# Patient Record
Sex: MALE | Race: WHITE | NOT HISPANIC OR LATINO | Employment: UNEMPLOYED | URBAN - NONMETROPOLITAN AREA
[De-identification: names, ages, dates, MRNs, and addresses within clinical notes are randomized per-mention and may not be internally consistent; named-entity substitution may affect disease eponyms.]

---

## 2017-01-04 ENCOUNTER — AMBULATORY - GICH (OUTPATIENT)
Dept: LAB | Facility: OTHER | Age: 42
End: 2017-01-04

## 2017-01-04 DIAGNOSIS — Z51.81 ENCOUNTER FOR THERAPEUTIC DRUG LEVEL MONITORING: ICD-10-CM

## 2017-01-04 LAB
A/G RATIO - HISTORICAL: 1.4 (ref 1–2)
ABSOLUTE BASOPHILS - HISTORICAL: 0.1 THOU/CU MM
ABSOLUTE EOSINOPHILS - HISTORICAL: 0.3 THOU/CU MM
ABSOLUTE LYMPHOCYTES - HISTORICAL: 1.7 THOU/CU MM (ref 0.9–2.9)
ABSOLUTE MONOCYTES - HISTORICAL: 0.5 THOU/CU MM
ABSOLUTE NEUTROPHILS - HISTORICAL: 4 THOU/CU MM (ref 1.7–7)
ALBUMIN SERPL-MCNC: 4.1 G/DL (ref 3.5–5.7)
ALP SERPL-CCNC: 69 IU/L (ref 34–104)
ALT (SGPT) - HISTORICAL: 28 IU/L (ref 7–52)
ANION GAP - HISTORICAL: 10 (ref 5–18)
AST SERPL-CCNC: 21 IU/L (ref 13–39)
BASOPHILS # BLD AUTO: 0.8 %
BILIRUB SERPL-MCNC: 0.4 MG/DL (ref 0.3–1)
BUN SERPL-MCNC: 9 MG/DL (ref 7–25)
BUN/CREAT RATIO - HISTORICAL: 8
CALCIUM SERPL-MCNC: 9.5 MG/DL (ref 8.6–10.3)
CHLORIDE SERPLBLD-SCNC: 105 MMOL/L (ref 98–107)
CHOL/HDL RATIO - HISTORICAL: 5.86
CHOLESTEROL TOTAL: 164 MG/DL
CO2 SERPL-SCNC: 27 MMOL/L (ref 21–31)
CREAT SERPL-MCNC: 1.15 MG/DL (ref 0.7–1.3)
EOSINOPHIL NFR BLD AUTO: 4.2 %
ERYTHROCYTE [DISTWIDTH] IN BLOOD BY AUTOMATED COUNT: 11.4 % (ref 11.5–15.5)
GFR IF NOT AFRICAN AMERICAN - HISTORICAL: >60 ML/MIN/1.73M2
GLOBULIN - HISTORICAL: 2.9 G/DL (ref 2–3.7)
GLUCOSE SERPL-MCNC: 111 MG/DL (ref 70–105)
HCT VFR BLD AUTO: 41.4 % (ref 37–53)
HDLC SERPL-MCNC: 28 MG/DL (ref 23–92)
HEMOGLOBIN: 14.3 G/DL (ref 13.5–17.5)
LDLC SERPL CALC-MCNC: 73 MG/DL
LYMPHOCYTES NFR BLD AUTO: 25.8 % (ref 20–44)
MCH RBC QN AUTO: 28.8 PG (ref 26–34)
MCHC RBC AUTO-ENTMCNC: 34.5 G/DL (ref 32–36)
MCV RBC AUTO: 84 FL (ref 80–100)
MONOCYTES NFR BLD AUTO: 8.2 %
NEUTROPHILS NFR BLD AUTO: 61 % (ref 42–72)
NON-HDL CHOLESTEROL - HISTORICAL: 136 MG/DL
PATIENT STATUS - HISTORICAL: ABNORMAL
PLATELET # BLD AUTO: 207 THOU/CU MM (ref 140–440)
PMV BLD: 8.4 FL (ref 6.5–11)
POTASSIUM SERPL-SCNC: 4.3 MMOL/L (ref 3.5–5.1)
PROLACTIN - HISTORICAL: 33.31 NG/ML
PROT SERPL-MCNC: 7 G/DL (ref 6.4–8.9)
RED BLOOD COUNT - HISTORICAL: 4.96 MIL/CU MM (ref 4.3–5.9)
SODIUM SERPL-SCNC: 142 MMOL/L (ref 133–143)
TRIGL SERPL-MCNC: 314 MG/DL
WHITE BLOOD COUNT - HISTORICAL: 6.5 THOU/CU MM (ref 4.5–11)

## 2017-01-23 ENCOUNTER — AMBULATORY - GICH (OUTPATIENT)
Dept: SCHEDULING | Facility: OTHER | Age: 42
End: 2017-01-23

## 2017-01-24 ENCOUNTER — COMMUNICATION - GICH (OUTPATIENT)
Dept: FAMILY MEDICINE | Facility: OTHER | Age: 42
End: 2017-01-24

## 2017-01-24 DIAGNOSIS — K59.00 CONSTIPATION: ICD-10-CM

## 2017-01-25 ENCOUNTER — COMMUNICATION - GICH (OUTPATIENT)
Dept: FAMILY MEDICINE | Facility: OTHER | Age: 42
End: 2017-01-25

## 2017-01-25 DIAGNOSIS — K59.00 CONSTIPATION: ICD-10-CM

## 2017-02-01 ENCOUNTER — COMMUNICATION - GICH (OUTPATIENT)
Dept: FAMILY MEDICINE | Facility: OTHER | Age: 42
End: 2017-02-01

## 2017-02-01 DIAGNOSIS — F20.9 SCHIZOPHRENIA (H): ICD-10-CM

## 2017-02-21 ENCOUNTER — OFFICE VISIT - GICH (OUTPATIENT)
Dept: FAMILY MEDICINE | Facility: OTHER | Age: 42
End: 2017-02-21

## 2017-02-21 ENCOUNTER — HISTORY (OUTPATIENT)
Dept: FAMILY MEDICINE | Facility: OTHER | Age: 42
End: 2017-02-21

## 2017-02-21 DIAGNOSIS — F20.9 SCHIZOPHRENIA (H): ICD-10-CM

## 2017-02-21 DIAGNOSIS — N52.2 DRUG-INDUCED ERECTILE DYSFUNCTION: ICD-10-CM

## 2017-02-21 DIAGNOSIS — E78.1 PURE HYPERGLYCERIDEMIA: ICD-10-CM

## 2017-02-21 DIAGNOSIS — E66.9 OBESITY: ICD-10-CM

## 2017-02-21 ASSESSMENT — PATIENT HEALTH QUESTIONNAIRE - PHQ9: SUM OF ALL RESPONSES TO PHQ QUESTIONS 1-9: 16

## 2017-03-22 ENCOUNTER — AMBULATORY - GICH (OUTPATIENT)
Dept: SCHEDULING | Facility: OTHER | Age: 42
End: 2017-03-22

## 2017-03-23 ENCOUNTER — COMMUNICATION - GICH (OUTPATIENT)
Dept: FAMILY MEDICINE | Facility: OTHER | Age: 42
End: 2017-03-23

## 2017-03-23 DIAGNOSIS — F20.9 SCHIZOPHRENIA (H): ICD-10-CM

## 2017-03-24 ENCOUNTER — AMBULATORY - GICH (OUTPATIENT)
Dept: SCHEDULING | Facility: OTHER | Age: 42
End: 2017-03-24

## 2017-08-18 ENCOUNTER — COMMUNICATION - GICH (OUTPATIENT)
Dept: FAMILY MEDICINE | Facility: OTHER | Age: 42
End: 2017-08-18

## 2017-08-18 DIAGNOSIS — E55.9 VITAMIN D DEFICIENCY: ICD-10-CM

## 2017-11-02 ENCOUNTER — AMBULATORY - GICH (OUTPATIENT)
Dept: LAB | Facility: OTHER | Age: 42
End: 2017-11-02

## 2017-11-02 DIAGNOSIS — Z51.81 ENCOUNTER FOR THERAPEUTIC DRUG LEVEL MONITORING: ICD-10-CM

## 2017-11-02 DIAGNOSIS — F20.9 SCHIZOPHRENIA (H): ICD-10-CM

## 2017-11-02 DIAGNOSIS — Z13.220 ENCOUNTER FOR SCREENING FOR LIPOID DISORDERS: ICD-10-CM

## 2017-11-02 LAB
A/G RATIO - HISTORICAL: 1.3 (ref 1–2)
ALBUMIN SERPL-MCNC: 4.3 G/DL (ref 3.5–5.7)
ALP SERPL-CCNC: 76 IU/L (ref 34–104)
ALT (SGPT) - HISTORICAL: 31 IU/L (ref 7–52)
ANION GAP - HISTORICAL: 10 (ref 5–18)
AST SERPL-CCNC: 22 IU/L (ref 13–39)
BILIRUB SERPL-MCNC: 0.4 MG/DL (ref 0.3–1)
BUN SERPL-MCNC: 13 MG/DL (ref 7–25)
BUN/CREAT RATIO - HISTORICAL: 12
CALCIUM SERPL-MCNC: 9.8 MG/DL (ref 8.6–10.3)
CHLORIDE SERPLBLD-SCNC: 104 MMOL/L (ref 98–107)
CHOL/HDL RATIO - HISTORICAL: 6.31
CHOLESTEROL TOTAL: 183 MG/DL
CO2 SERPL-SCNC: 25 MMOL/L (ref 21–31)
CREAT SERPL-MCNC: 1.11 MG/DL (ref 0.7–1.3)
GFR IF NOT AFRICAN AMERICAN - HISTORICAL: >60 ML/MIN/1.73M2
GLOBULIN - HISTORICAL: 3.3 G/DL (ref 2–3.7)
GLUCOSE SERPL-MCNC: 99 MG/DL (ref 70–105)
HDLC SERPL-MCNC: 29 MG/DL (ref 23–92)
LDLC SERPL CALC-MCNC: 103 MG/DL
NON-HDL CHOLESTEROL - HISTORICAL: 154 MG/DL
POTASSIUM SERPL-SCNC: 4.1 MMOL/L (ref 3.5–5.1)
PROLACTIN - HISTORICAL: 3.94 NG/ML
PROT SERPL-MCNC: 7.6 G/DL (ref 6.4–8.9)
PROVIDER ORDERDED STATUS - HISTORICAL: ABNORMAL
SODIUM SERPL-SCNC: 139 MMOL/L (ref 133–143)
TRIGL SERPL-MCNC: 255 MG/DL

## 2017-11-09 ENCOUNTER — AMBULATORY - GICH (OUTPATIENT)
Dept: LAB | Facility: OTHER | Age: 42
End: 2017-11-09

## 2017-11-09 DIAGNOSIS — F20.9 SCHIZOPHRENIA (H): ICD-10-CM

## 2017-11-09 DIAGNOSIS — Z13.220 ENCOUNTER FOR SCREENING FOR LIPOID DISORDERS: ICD-10-CM

## 2017-11-09 DIAGNOSIS — Z51.81 ENCOUNTER FOR THERAPEUTIC DRUG LEVEL MONITORING: ICD-10-CM

## 2017-11-09 LAB
ABSOLUTE BASOPHILS - HISTORICAL: 0 THOU/CU MM
ABSOLUTE EOSINOPHILS - HISTORICAL: 0.2 THOU/CU MM
ABSOLUTE IMMATURE GRANULOCYTES(METAS,MYELOS,PROS) - HISTORICAL: 0 THOU/CU MM
ABSOLUTE LYMPHOCYTES - HISTORICAL: 1.4 THOU/CU MM (ref 0.9–2.9)
ABSOLUTE MONOCYTES - HISTORICAL: 0.4 THOU/CU MM
ABSOLUTE NEUTROPHILS - HISTORICAL: 2.4 THOU/CU MM (ref 1.7–7)
BASOPHILS # BLD AUTO: 0.5 %
EOSINOPHIL NFR BLD AUTO: 4.3 %
ERYTHROCYTE [DISTWIDTH] IN BLOOD BY AUTOMATED COUNT: 12.3 % (ref 11.5–15.5)
HCT VFR BLD AUTO: 40.2 % (ref 37–53)
HEMOGLOBIN: 14.3 G/DL (ref 13.5–17.5)
IMMATURE GRANULOCYTES(METAS,MYELOS,PROS) - HISTORICAL: 0.2 %
LYMPHOCYTES NFR BLD AUTO: 32 % (ref 20–44)
MCH RBC QN AUTO: 30 PG (ref 26–34)
MCHC RBC AUTO-ENTMCNC: 35.6 G/DL (ref 32–36)
MCV RBC AUTO: 84 FL (ref 80–100)
MONOCYTES NFR BLD AUTO: 8.9 %
NEUTROPHILS NFR BLD AUTO: 54.1 % (ref 42–72)
PLATELET # BLD AUTO: 178 THOU/CU MM (ref 140–440)
PMV BLD: 9.9 FL (ref 6.5–11)
RED BLOOD COUNT - HISTORICAL: 4.77 MIL/CU MM (ref 4.3–5.9)
WHITE BLOOD COUNT - HISTORICAL: 4.4 THOU/CU MM (ref 4.5–11)

## 2017-12-28 NOTE — TELEPHONE ENCOUNTER
Patient Information     Patient Name MRN Sex Tenzin Montalvo [id number] Male 1975      Telephone Encounter by Georgina Millan RN at 2017  1:31 PM     Author:  Gerogina Millan RN Service:  (none) Author Type:  NURS- Registered Nurse     Filed:  2017  1:36 PM Encounter Date:  2017 Status:  Signed     :  Georgina Millan RN (NURS- Registered Nurse)            Pharmacy requesting refill of Vitamin D3 5000 IU. This is listed as a historical medication and has not been prescribed my Robin Smith MD in the past. Request was originally sent to [facility], but it was returned to pharmacy with a note to send to PCP.    Last appointment with PCP 17    Will route to PCP for review and approval if appropriate.      Unable to complete prescription refill per RN Medication Refill Policy.................... Georgina Millan RN ....................  2017   1:34 PM

## 2017-12-28 NOTE — ADDENDUM NOTE
Patient Information     Patient Name MRN Sex Tenzin Montalvo [id number] Male 1975      Addendum Note by Sia Hanson at 2017 11:21 AM     Author:  Sia Hanson Service:  (none) Author Type:  (none)     Filed:  2017 11:21 AM Encounter Date:  2017 Status:  Signed     :  Sia Hanson       Addended by: SIA HANSON on: 2017 11:21 AM        Modules accepted: Orders

## 2018-01-03 NOTE — TELEPHONE ENCOUNTER
Patient Information     Patient Name MRN Sex Tenzin Montalvo [id number] Male 1975      Telephone Encounter by Robin Smith MD at 2017  3:06 PM     Author:  Robin Smith MD Service:  (none) Author Type:  Physician     Filed:  2017  3:08 PM Encounter Date:  2017 Status:  Signed     :  Robin Smith MD (Physician)            Home health certification and plan of care reviewed and signed today. Skilled nursing services required for monitoring of schizophrenia.

## 2018-01-03 NOTE — NURSING NOTE
Patient Information     Patient Name MRN Sex Tenzin Montalvo [id number] Male 1975      Nursing Note by Nely Dimas at 2017  7:45 AM     Author:  Nely Dimas Service:  (none) Author Type:  (none)     Filed:  2017  7:53 AM Encounter Date:  2017 Status:  Signed     :  Nely Dimas            Patient presents today as a follow-up on chronic medical condition.  Nely Dimas LPN  2017  7:38 AM

## 2018-01-03 NOTE — TELEPHONE ENCOUNTER
Patient Information     Patient Name MRN Tenzin Wagoner [id number] Male 1975      Telephone Encounter by Georgina Millan RN at 2017  1:19 PM     Author:  Georgina Millan RN Service:  (none) Author Type:  NURS- Registered Nurse     Filed:  2017  1:22 PM Encounter Date:  2017 Status:  Signed     :  Georgina Millan RN (NURS- Registered Nurse)            Redundant Refill Request for Senna refused;  Medication:SENNA PLUS 8.6-50 mg tablet  Prescription #:[id number]    Qty:90 tablet   Ref:3  Start:2016   Unable to complete prescription refill per RN Medication Refill Policy.................... Georgina Millan RN ....................  2017   1:20 PM

## 2018-01-03 NOTE — TELEPHONE ENCOUNTER
Patient Information     Patient Name MRN Tenzin Wagoner [id number] Male 1975      Telephone Encounter by Georgina Millan RN at 2017  7:55 AM     Author:  Georgina Millan RN Service:  (none) Author Type:  NURS- Registered Nurse     Filed:  2017  8:00 AM Encounter Date:  2017 Status:  Signed     :  Georgina Millan RN (NURS- Registered Nurse)            Pharmacy states he has used up all refills since . Should have had enough to last him until 2017. Fill dates: 16, 16, 16, 16, 10/7/16, 16 Please advise.    This is a Refill request from: [facility]   Name of Medication: Senna  Quantity requested: 90  Last fill date: 16  Due for refill: no  Last visit with FRANCESCO SMITH was on: 2016 in [facility]  PCP:  Francesco Smith MD  Controlled Substance Agreement:  n/a   Diagnosis r/t this medication request: Constipation     Unable to complete prescription refill per RN Medication Refill Policy.................... Georgina Millan RN ....................  2017   7:56 AM

## 2018-01-03 NOTE — PATIENT INSTRUCTIONS
Patient Information     Patient Name MRN Tenzin Wagoner [id number] Male 1975      Patient Instructions by Robin Smith MD at 2017  7:45 AM     Author:  Robin Smith MD Service:  (none) Author Type:  Physician     Filed:  2017  8:12 AM Encounter Date:  2017 Status:  Signed     :  Robin Smith MD (Physician)            Natural fats and proteins are good for you.  Avoid sugars and starches including bread, pasta, cereal, rice and potatoes.  Full fat dairy is OK.

## 2018-01-03 NOTE — PROGRESS NOTES
Patient Information     Patient Name MRN Tenzin Wagoner [id number] Male 1975      Progress Notes by Robin Smith MD at 2017  7:45 AM     Author:  Robin Smith MD Service:  (none) Author Type:  Physician     Filed:  2017  8:27 AM Encounter Date:  2017 Status:  Signed     :  Robin Smith MD (Physician)            SUBJECTIVE:    Tenzin Ca is a 41 y.o. male who presents for review of chronic health issues.    HPI: Patient has a history of chronic schizophrenia with medication management by [facility]. He had blood work done recently which he would like to discuss. He thinks his medications are causing increased appetite and his weight is up about 15 pounds since August of last year. His schizophrenia has been well-controlled recently. He notes some difficulty with erections also likely related to his medications and obesity.    PROBLEM LIST:  Patient Active Problem List     Diagnosis  Code     MAJOR DPRSV DISORDER RECURRENT EPISODE UNSPEC F33.9     SCHIZOPHRENIA F20.9     HYPERTRIGLYCERIDEMIA E78.1     PSORIASIS L40.9     LUMBAR STRAIN, ACUTE S33.5XXA     Anal fissure K60.2     Delayed ejaculation F52.32     Erectile dysfunction N52.9     Chronic pain of left knee M25.562, G89.29     Plica of knee M67.50     Torn medial meniscus S83.249A     S/P left knee arthroscopy Z98.890     PAST MEDICAL HISTORY:  Past Medical History      Diagnosis   Date     Chronic mental illness       Disability related to mental illness       Hx of psychiatric hospitalization  2016     SURGICAL HISTORY:  Past Surgical History       Procedure   Laterality Date     Knee arthroscopy  Left      Left Knee Arthroscopy and Partial Meniscectomy and Chondroplasty         SOCIAL HISTORY:  Social History     Social History        Marital status:  Single     Spouse name: N/A     Number of children:  N/A     Years of education:  N/A     Occupational History      Not on file.     Social History Main  Topics       Smoking status: Never Smoker     Smokeless tobacco: Never Used     Alcohol use No     Drug use: No     Sexual activity: Not on file     Other Topics  Concern     Seat Belt Yes     Social History Narrative     Disabled secondary to schizophrenia. Lives independently in his own apartment.     FAMILY HISTORY:  Family History      Problem  Relation Age of Onset     Alcohol/Drug Mother      Alcoholism Mother      Alcohol/Drug Brother      Alcoholism Brother      Alcohol/Drug Sister      Alcoholism Sister       CURRENT MEDICATIONS:   Current Outpatient Prescriptions       Medication  Sig Dispense Refill     cholecalciferol (VITAMIN D3) 5,000 unit capsule Take 5,000 Units by mouth once daily.  2     clonazePAM (KLONOPIN) 1 mg tablet Take 2 tablets by mouth once daily.       fish oil-omega-3 fatty acids (FISH OIL) 1,000-340 mg capsule Take 1 capsule by mouth once daily. 100 capsule 3     hydrOXYzine pamoate 100 mg capsule Take one capsule by mouth twice daily.  2     INVEGA SUSTENNA 117 mg/0.75 mL injection Inject 0.75 mL intramuscular/subcutaneous every 3 weeks.  0     OXcarbazepine (TRILEPTAL) 300 mg tablet Take 1 tablet by mouth every morning.       OXcarbazepine (TRILEPTAL) 600 mg tablet Take 1 tablet by mouth at bedtime.  0     sennosides-docusate, 8.6-50 mg, (SENNA PLUS) 8.6-50 mg tablet Take 1 tablet by mouth once daily. 90 tablet 5     traZODone (DESYREL) 100 mg tablet Take 1 tablet by mouth at bedtime.       trihexyphenidyl (ARTANE) 2 mg tablet Take one tablet by mouth in the am and 2 tablets at bedtime       zolpidem (AMBIEN) 10 mg tablet Take 1 tablet by mouth at bedtime.       No current facility-administered medications for this visit.      Medications have been reviewed by me and are current to the best of my knowledge and ability.    ALLERGIES:  Review of patient's allergies indicates no known allergies.    REVIEW OF SYSTEMS:  General: denies any general problems.  Eyes: denies  "problems  Ears/Nose/Throat: Reports frequent sneezing.  Cardiovascular: denies problems  Respiratory: denies problems  Gastrointestinal: denies problems  Genitourinary - male: see HPI  Musculoskeletal: denies problems  Skin: denies problems  Neurologic: denies problems  Psychiatric: denies problems  Endocrine: denies problems  Heme/Lymphatic: denies problems  Allergic/Immunologic: denies problems  PHQ Depression Screening 2/21/2017   Date of PHQ exam (doc flow) 2/21/2017   1. Lack of interest/pleasure 2 - More than half the days   2. Feeling down/depressed 2 - More than half the days   PHQ-2 TOTAL SCORE 4   3. Trouble sleeping 3 - Nearly every day   4. Decreased energy 3 - Nearly every day   5. Appetite change 3 - Nearly every day   6. Feelings of failure 1 - Several days   7. Trouble concentrating 0 - Not at all   8. Activity level 1 - Several days   9. Hurting yourself 1 - Several days   PHQ-9 TOTAL SCORE 16   PHQ-9 Severity Level moderately severe   Functional Impairment very difficult      OBJECTIVE:  /76  Pulse 80  Ht 1.746 m (5' 8.75\")  Wt 127 kg (280 lb)  BMI 41.65 kg/m2  EXAM:   General Appearance: Pleasant, alert, appropriate appearance for age. No acute distress  Head Exam: Normal. Normocephalic, atraumatic.  Eye Exam:  Normal external eye, conjunctiva, lids, cornea. DELMAR.  Ear Exam: Normal TM's bilaterally. Normal auditory canals and external ears. Non-tender.  Nose Exam: Normal external nose, mucus membranes, and septum.  OroPharynx Exam:  Dental hygiene adequate. Normal buccal mucosa. Normal pharynx.  Neck Exam:  Supple, no masses or nodes.  Thyroid Exam: No nodules or enlargement.  Chest/Respiratory Exam: Normal chest wall and respirations. Clear to auscultation.  Cardiovascular Exam: Regular rate and rhythm. S1, S2, no murmur, click, gallop, or rubs.  Gastrointestinal Exam: Soft, non-tender, no masses or organomegaly.  Genitourinary Exam Male: Normal male genitalia. No discharge or penile " ulcerations. No testicular masses or swelling.  Lymphatic Exam: Non-palpable nodes in neck, clavicular, axillary, or inguinal regions.  Musculoskeletal Exam: Back is straight and non-tender, full ROM of upper and lower extremities.  Foot Exam: Left and right foot: good pedal pulses, no lesions, nail hygiene good.  Skin: no rash or abnormalities  Neurologic Exam: Nonfocal, symmetric DTRs, normal gross motor, tone coordination and no tremor.  Psychiatric Exam: Alert and oriented - appropriate affect.    Results for orders placed or performed in visit on 01/04/17      COMP METABOLIC PANEL      Result  Value Ref Range    SODIUM 142 133 - 143 mmol/L    POTASSIUM 4.3 3.5 - 5.1 mmol/L    CHLORIDE 105 98 - 107 mmol/L    CO2,TOTAL 27 21 - 31 mmol/L    ANION GAP 10 5 - 18                    GLUCOSE 111 (H) 70 - 105 mg/dL    CALCIUM 9.5 8.6 - 10.3 mg/dL    BUN 9 7 - 25 mg/dL    CREATININE 1.15 0.70 - 1.30 mg/dL    BUN/CREAT RATIO           8                    GFR if African American >60 >60 ml/min/1.73m2    GFR if not African American >60 >60 ml/min/1.73m2    ALBUMIN 4.1 3.5 - 5.7 g/dL    PROTEIN,TOTAL 7.0 6.4 - 8.9 g/dL    GLOBULIN                  2.9 2.0 - 3.7 g/dL    A/G RATIO 1.4 1.0 - 2.0                    BILIRUBIN,TOTAL 0.4 0.3 - 1.0 mg/dL    ALK PHOSPHATASE 69 34 - 104 IU/L    ALT (SGPT) 28 7 - 52 IU/L    AST (SGOT) 21 13 - 39 IU/L   LIPID PANEL      Result  Value Ref Range    CHOLESTEROL,TOTAL 164 <200 mg/dL    TRIGLYCERIDES 314 (H) <150 mg/dL    HDL CHOLESTEROL 28 23 - 92 mg/dL    NON-HDL CHOLESTEROL 136 <145 mg/dl    CHOL/HDL RATIO            5.86 (H) <4.50                    LDL CHOLESTEROL 73 <100 mg/dL    PATIENT STATUS            FASTING                   PROLACTIN      Result  Value Ref Range    PROLACTIN 33.31 ng/mL   CBC WITH AUTO DIFFERENTIAL      Result  Value Ref Range    WHITE BLOOD COUNT         6.5 4.5 - 11.0 thou/cu mm    RED BLOOD COUNT           4.96 4.30 - 5.90 mil/cu mm    HEMOGLOBIN                 14.3 13.5 - 17.5 g/dL    HEMATOCRIT                41.4 37.0 - 53.0 %    MCV                       84 80 - 100 fL    MCH                       28.8 26.0 - 34.0 pg    MCHC                      34.5 32.0 - 36.0 g/dL    RDW                       11.4 (L) 11.5 - 15.5 %    PLATELET COUNT            207 140 - 440 thou/cu mm    MPV                       8.4 6.5 - 11.0 fL    NEUTROPHILS               61.0 42.0 - 72.0 %    LYMPHOCYTES               25.8 20.0 - 44.0 %    MONOCYTES                 8.2 <12.0 %    EOSINOPHILS               4.2 <8.0 %    BASOPHILS                 0.8 <3.0 %    ABSOLUTE NEUTROPHILS      4.0 1.7 - 7.0 thou/cu mm    ABSOLUTE LYMPHOCYTES      1.7 0.9 - 2.9 thou/cu mm    ABSOLUTE MONOCYTES        0.5 <0.9 thou/cu mm    ABSOLUTE EOSINOPHILS      0.3 <0.5 thou/cu mm    ABSOLUTE BASOPHILS        0.1 <0.3 thou/cu mm       ASSESSMENT/PLAN:    ICD-10-CM    1. SCHIZOPHRENIA  Managed by [facility].  F20.9    2. Obesity, unspecified obesity severity, unspecified obesity type  Recommended limiting dietary sugar and processed carbohydrates.  E66.9    3. HYPERTRIGLYCERIDEMIA  With low HDL. The above dietary changes and weight loss/exercise would be beneficial in this regard.  E78.1    4. Drug-induced erectile dysfunction  He will discuss this concern with his mental health provider.  N52.2        Robin Smith MD

## 2018-01-04 NOTE — TELEPHONE ENCOUNTER
Patient Information     Patient Name MRN Sex Tenzin Montalvo [id number] Male 1975      Telephone Encounter by Robin Smith MD at 3/23/2017  8:59 AM     Author:  Robin Smith MD Service:  (none) Author Type:  Physician     Filed:  3/23/2017  9:01 AM Encounter Date:  3/23/2017 Status:  Signed     :  Robin Smith MD (Physician)            Home health recertification and plan of care signed today. Patient has schizophrenia and needs medication monitoring and Invega administration by an RN.

## 2018-01-26 VITALS
DIASTOLIC BLOOD PRESSURE: 76 MMHG | HEART RATE: 80 BPM | BODY MASS INDEX: 41.47 KG/M2 | HEIGHT: 69 IN | SYSTOLIC BLOOD PRESSURE: 108 MMHG | WEIGHT: 280 LBS

## 2018-01-29 ASSESSMENT — PATIENT HEALTH QUESTIONNAIRE - PHQ9: SUM OF ALL RESPONSES TO PHQ QUESTIONS 1-9: 16

## 2018-02-20 ENCOUNTER — DOCUMENTATION ONLY (OUTPATIENT)
Dept: FAMILY MEDICINE | Facility: OTHER | Age: 43
End: 2018-02-20

## 2018-02-20 PROBLEM — F33.9 MAJOR DEPRESSIVE DISORDER, RECURRENT EPISODE (H): Status: ACTIVE | Noted: 2018-02-20

## 2018-02-20 PROBLEM — L40.9 PSORIASIS: Status: ACTIVE | Noted: 2018-02-20

## 2018-02-20 PROBLEM — F20.9 SCHIZOPHRENIA (H): Status: ACTIVE | Noted: 2018-02-20

## 2018-02-20 PROBLEM — S83.249A TORN MEDIAL MENISCUS: Status: ACTIVE | Noted: 2018-02-20

## 2018-02-20 PROBLEM — E66.9 OBESITY: Status: ACTIVE | Noted: 2017-02-21

## 2018-02-20 PROBLEM — M67.50 PLICA OF KNEE: Status: ACTIVE | Noted: 2018-02-20

## 2018-02-20 PROBLEM — E78.1 HYPERTRIGLYCERIDEMIA: Status: ACTIVE | Noted: 2018-02-20

## 2018-02-20 RX ORDER — HYDROXYZINE PAMOATE 100 MG
1 CAPSULE ORAL 2 TIMES DAILY
COMMUNITY
Start: 2016-06-09 | End: 2020-02-26

## 2018-02-20 RX ORDER — OXCARBAZEPINE 300 MG/1
300 TABLET, FILM COATED ORAL EVERY MORNING
COMMUNITY
Start: 2017-02-21

## 2018-02-20 RX ORDER — TRAZODONE HYDROCHLORIDE 100 MG/1
100 TABLET ORAL AT BEDTIME
COMMUNITY
Start: 2017-02-21 | End: 2018-02-22

## 2018-02-20 RX ORDER — TRIHEXYPHENIDYL HYDROCHLORIDE 2 MG/1
TABLET ORAL
COMMUNITY
Start: 2017-02-21 | End: 2018-03-29

## 2018-02-20 RX ORDER — AMOXICILLIN 250 MG
1 CAPSULE ORAL DAILY
COMMUNITY
Start: 2017-01-25 | End: 2018-02-22

## 2018-02-20 RX ORDER — OXCARBAZEPINE 600 MG/1
600 TABLET, FILM COATED ORAL AT BEDTIME
COMMUNITY
Start: 2016-06-09

## 2018-02-20 RX ORDER — ZOLPIDEM TARTRATE 10 MG/1
10 TABLET ORAL AT BEDTIME
COMMUNITY
Start: 2017-02-21

## 2018-02-20 RX ORDER — CLONAZEPAM 1 MG/1
2 TABLET ORAL DAILY
COMMUNITY
Start: 2017-02-21 | End: 2018-03-29

## 2018-02-22 ENCOUNTER — OFFICE VISIT (OUTPATIENT)
Dept: FAMILY MEDICINE | Facility: OTHER | Age: 43
End: 2018-02-22
Attending: FAMILY MEDICINE
Payer: MEDICARE

## 2018-02-22 VITALS
DIASTOLIC BLOOD PRESSURE: 70 MMHG | WEIGHT: 266.4 LBS | HEIGHT: 69 IN | SYSTOLIC BLOOD PRESSURE: 102 MMHG | HEART RATE: 72 BPM | BODY MASS INDEX: 39.46 KG/M2

## 2018-02-22 DIAGNOSIS — E55.9 VITAMIN D DEFICIENCY: ICD-10-CM

## 2018-02-22 DIAGNOSIS — K59.01 SLOW TRANSIT CONSTIPATION: Primary | ICD-10-CM

## 2018-02-22 PROBLEM — E53.8 VITAMIN B12 DEFICIENCY (NON ANEMIC): Status: ACTIVE | Noted: 2018-02-22

## 2018-02-22 PROCEDURE — G0463 HOSPITAL OUTPT CLINIC VISIT: HCPCS

## 2018-02-22 PROCEDURE — 99213 OFFICE O/P EST LOW 20 MIN: CPT | Performed by: FAMILY MEDICINE

## 2018-02-22 RX ORDER — ARIPIPRAZOLE 10 MG/1
1 TABLET ORAL DAILY
Refills: 1 | COMMUNITY
Start: 2017-05-30 | End: 2018-03-29

## 2018-02-22 RX ORDER — ARIPIPRAZOLE 20 MG/1
20 TABLET ORAL EVERY MORNING
Refills: 5 | COMMUNITY
Start: 2018-01-18 | End: 2021-11-22

## 2018-02-22 RX ORDER — AMOXICILLIN 250 MG
1 CAPSULE ORAL DAILY
Qty: 100 TABLET | Refills: 3 | Status: SHIPPED | OUTPATIENT
Start: 2018-02-22 | End: 2018-03-29

## 2018-02-22 ASSESSMENT — PAIN SCALES - GENERAL: PAINLEVEL: NO PAIN (0)

## 2018-02-22 NOTE — MR AVS SNAPSHOT
"              After Visit Summary   2018    Tenzin Ca    MRN: [id number]           Patient Information     Date Of Birth          1975        Visit Information        Provider Department      2018 11:15 AM Ervin Hassan MD [facility]        Today's Diagnoses     Slow transit constipation    -  1    Vitamin D deficiency           Follow-ups after your visit        Who to contact     If you have questions or need follow up information about today's clinic visit or your schedule please contact [facility] directly at [phone].  Normal or non-critical lab and imaging results will be communicated to you by Werkadoohart, letter or phone within 4 business days after the clinic has received the results. If you do not hear from us within 7 days, please contact the clinic through Collusiont or phone. If you have a critical or abnormal lab result, we will notify you by phone as soon as possible.  Submit refill requests through EpiBone or call your pharmacy and they will forward the refill request to us. Please allow 3 business days for your refill to be completed.          Additional Information About Your Visit        MyChart Information     EpiBone lets you send messages to your doctor, view your test results, renew your prescriptions, schedule appointments and more. To sign up, go to [email or web address] . Click on \"Log in\" on the left side of the screen, which will take you to the Welcome page. Then click on \"Sign up Now\" on the right side of the page.     You will be asked to enter the access code listed below, as well as some personal information. Please follow the directions to create your username and password.     Your access code is: [email or web address]  Expires: 2018 11:56 AM     Your access code will  in 90 days. If you need help or a new code, please call your [facility] clinic or [phone].        Care EveryWhere ID     This is your Care " "EveryWhere ID. This could be used by other organizations to access your [facility] medical records  [id number]        Your Vitals Were     Pulse Height BMI (Body Mass Index)             72 1.74 m (5' 8.5\") 39.92 kg/m2          Blood Pressure from Last 3 Encounters:   02/22/18 102/70   02/21/17 108/76   08/25/16 106/70    Weight from Last 3 Encounters:   02/22/18 120.8 kg (266 lb 6.4 oz)   02/21/17 127 kg (280 lb)   08/25/16 120.2 kg (265 lb)              Today, you had the following     No orders found for display         Today's Medication Changes          These changes are accurate as of 2/22/18 11:59 AM.  If you have any questions, ask your nurse or doctor.               These medicines have changed or have updated prescriptions.        Dose/Directions    * cholecalciferol 5000 UNITS Caps   This may have changed:  Another medication with the same name was added. Make sure you understand how and when to take each.   Used for:  Vitamin D deficiency        Dose:  5000 Units   Take 1 capsule (5,000 Units) by mouth daily   Quantity:  30 capsule   Refills:  0       * D 5000 5000 UNITS Caps   This may have changed:  Another medication with the same name was added. Make sure you understand how and when to take each.   Generic drug:  cholecalciferol        Dose:  5000 Units   Take 5,000 Units by mouth daily   Refills:  0       * cholecalciferol 1000 UNIT tablet   Commonly known as:  vitamin D3   This may have changed:  You were already taking a medication with the same name, and this prescription was added. Make sure you understand how and when to take each.   Used for:  Vitamin D deficiency        Dose:  1000 Units   Take 1 tablet (1,000 Units) by mouth daily   Quantity:  100 tablet   Refills:  3       * Notice:  This list has 3 medication(s) that are the same as other medications prescribed for you. Read the directions carefully, and ask your doctor or other care provider to review them with you.      Stop taking these " medicines if you haven't already. Please contact your care team if you have questions.     traZODone 100 MG tablet   Commonly known as:  DESYREL           TRAZODONE HCL PO                Where to get your medicines      These medications were sent to [id number] - [place], [place] - [address]  [address], [place] [address]     Phone:  [phone]     cholecalciferol 1000 UNIT tablet    senna-docusate 8.6-50 MG per tablet                Primary Care Provider Office Phone # Fax #    Ervin Hassan [phone]       [address]  [place] [address]        Equal Access to Services     [facility] : Hadii timo ernsto Soleela, waaxda luqadaha, qaybta kaalmada noegyada, nahid vicente . So [facility] [phone].    ATENCIÓN: Si habla español, tiene a swanson disposición servicios gratuitos de asistencia lingüística. [facility] [phone].    We comply with applicable federal civil rights laws and [place] laws. We do not discriminate on the basis of race, color, national origin, age, disability, sex, sexual orientation, or gender identity.            Thank you!     Thank you for choosing [facility] AND [facility]  for your care. Our goal is always to provide you with excellent care. Hearing back from our patients is one way we can continue to improve our services. Please take a few minutes to complete the written survey that you may receive in the mail after your visit with us. Thank you!             Your Updated Medication List - Protect others around you: Learn how to safely use, store and throw away your medicines at www.disposemymeds.org.          This list is accurate as of 2/22/18 11:59 AM.  Always use your most recent med list.                   Brand Name Dispense Instructions for use Diagnosis    * ARIPiprazole 10 MG tablet    ABILIFY     Take 1 tablet by mouth daily        * ARIPiprazole 20 MG tablet     ABILIFY     Take 20 mg by mouth every morning        * cholecalciferol 5000 UNITS Caps     30 capsule    Take 1 capsule (5,000 Units) by mouth daily    Vitamin D deficiency       * D 5000 5000 UNITS Caps   Generic drug:  cholecalciferol      Take 5,000 Units by mouth daily        * cholecalciferol 1000 UNIT tablet    vitamin D3    100 tablet    Take 1 tablet (1,000 Units) by mouth daily    Vitamin D deficiency       * CLONAZEPAM PO      Take 2 mg by mouth 2 times daily        * clonazePAM 1 MG tablet    klonoPIN     Take 2 mg by mouth daily        divalproex sodium delayed-release 250 MG DR tablet    DEPAKOTE    180 tablet    Take 3 tablets (750 mg) by mouth 2 times daily    Schizoaffective disorder, bipolar type (H)       * HYDROXYZINE PAMOATE PO      Take 100 mg by mouth 2 times daily        * hydrOXYzine 100 MG Caps      Take 1 capsule by mouth 2 times daily        INVEGA SUSTENNA 117 MG/0.75ML Susp   Generic drug:  paliperidone      Inject 0.75 mLs into the muscle every 3 weeks.        * OMEGA-3 FISH OIL PO      Take 1 g by mouth Use as directed        * FISH OIL PO      Take 1 capsule by mouth daily        * OXCARBAZEPINE PO      Take 600 mg by mouth 2 times daily        * OXcarbazepine 600 MG tablet    TRILEPTAL     Take 600 mg by mouth At Bedtime        * OXcarbazepine 300 MG tablet    TRILEPTAL     Take 300 mg by mouth every morning        * senna-docusate 8.6-50 MG per tablet    SENOKOT-S;PERICOLACE    60 tablet    Take 1 tablet by mouth 2 times daily    Schizoaffective disorder, bipolar type (H)       * senna-docusate 8.6-50 MG per tablet    SENOKOT-S;PERICOLACE    100 tablet    Take 1 tablet by mouth daily    Slow transit constipation       * TRIHEXYPHENIDYL HCL PO      Take 2 mg by mouth every morning        * TRIHEXYPHENIDYL HCL PO      Take 4 mg by mouth every evening        * trihexyphenidyl 2 MG tablet    ARTANE     Take one tablet by mouth in the am and 2 tablets at bedtime        * ziprasidone 40  MG capsule    GEODON    60 capsule    Take 1 capsule (40 mg) by mouth 2 times daily (with meals)    Schizoaffective disorder, bipolar type (H)       * ziprasidone 80 MG capsule    GEODON    60 capsule    Take 1 capsule (80 mg) by mouth 2 times daily (with meals)    Schizoaffective disorder, bipolar type (H)       zolpidem 10 MG tablet    AMBIEN     Take 10 mg by mouth At Bedtime        * Notice:  This list has 21 medication(s) that are the same as other medications prescribed for you. Read the directions carefully, and ask your doctor or other care provider to review them with you.

## 2018-02-22 NOTE — NURSING NOTE
Patient here to establish care and medication refills. Jaquelin Aguilera LPN .......................2/22/2018  11:32 AM

## 2018-02-22 NOTE — PROGRESS NOTES
SUBJECTIVE:   Tenzin Ca is a 42 year old male who presents to clinic today for the following health issues: Establish care medication refill    HPI Comments: Patient arrives here to establish care and medication refill.  He is in need of vitamin D.  His last vitamin D level was normal.  He is on 5000 units a day.  Also Colace for constipation.  Rest of his medications were given out by his mental health provider.  Patient does have a history of psychosis.  Major depression and schizophrenia.        Patient Active Problem List    Diagnosis Date Noted     Vitamin B12 deficiency (non anemic) 02/22/2018     Priority: Medium     Slow transit constipation 02/22/2018     Priority: Medium     Hypertriglyceridemia 02/20/2018     Priority: Medium     Major depressive disorder, recurrent episode (H) 02/20/2018     Priority: Medium     Plica of knee 02/20/2018     Priority: Medium     Psoriasis 02/20/2018     Priority: Medium     Schizophrenia (H) 02/20/2018     Priority: Medium     Overview:   hears voices       Torn medial meniscus 02/20/2018     Priority: Medium     Obesity 02/21/2017     Priority: Medium     S/P left knee arthroscopy 07/05/2016     Priority: Medium     Chronic pain of left knee 04/07/2016     Priority: Medium     Psychosis 07/02/2015     Priority: Medium     Erectile dysfunction 04/23/2014     Priority: Medium     Anal fissure 06/03/2013     Priority: Medium     Delayed ejaculation 06/03/2013     Priority: Medium     Sprain of lumbar region 06/21/2012     Priority: Medium     Past Medical History:   Diagnosis Date     Mental disorder     Disability related to mental illness     Personal history of other mental and behavioral disorders     2016      Past Surgical History:   Procedure Laterality Date     ARTHROSCOPY KNEE      Left Knee Arthroscopy and Partial Meniscectomy and Chondroplasty     No Known Allergies    Review of Systems     OBJECTIVE:     /70 (BP Location: Right arm, Patient Position:  "Sitting)  Pulse 72  Ht 5' 8.5\" (1.74 m)  Wt 266 lb 6.4 oz (120.8 kg)  BMI 39.92 kg/m2  Body mass index is 39.92 kg/(m^2).  Physical Exam   Constitutional: He appears well-developed.   Pulmonary/Chest: Effort normal.   Neurological: He is alert.   Psychiatric: He has a normal mood and affect.       none     ASSESSMENT/PLAN:         1. Vitamin D deficiency    - cholecalciferol (VITAMIN D3) 1000 UNIT tablet; Take 1 tablet (1,000 Units) by mouth daily  Dispense: 100 tablet; Refill: 3    2. Slow transit constipation    - senna-docusate (SENOKOT-S;PERICOLACE) 8.6-50 MG per tablet; Take 1 tablet by mouth daily  Dispense: 100 tablet; Refill: 3      Ervin Hassan MD  [facility] AND [facility]  "

## 2018-02-27 NOTE — TELEPHONE ENCOUNTER
"Clarification request received from [id number]:    Order sent to [facility] on 2018:    cholecalciferol (VITAMIN D3) 1000 UNIT tablet 100 tablet 3 2018  --   Sig: Take 1 tablet (1,000 Units) by mouth daily     Note from pharmacy:    \"This was last filled on 2018 for the Vitamin D3 5,ooo IU, is this a decrease or adding?\"    Per notes from office visit with Ervin Hassan MD on :    \"He is in need of vitamin D.  His last vitamin D level was normal.  He is on 5000 units a day. \"    Per assessment/plan from same office visit:    1. Vitamin D deficiency     - cholecalciferol (VITAMIN D3) 1000 UNIT tablet; Take 1 tablet (1,000 Units) by mouth daily  Dispense: 100 tablet; Refill: 3    Called [facility] and spoke with pharmacist, after verifying Patient's last name and . She was notified that this change was made at office visit.     Christine Palomino RN .............. 2018  4:28 PM           "

## 2018-03-29 ENCOUNTER — OFFICE VISIT (OUTPATIENT)
Dept: FAMILY MEDICINE | Facility: OTHER | Age: 43
End: 2018-03-29
Attending: FAMILY MEDICINE
Payer: MEDICARE

## 2018-03-29 VITALS
HEART RATE: 64 BPM | TEMPERATURE: 98 F | WEIGHT: 265 LBS | BODY MASS INDEX: 39.71 KG/M2 | SYSTOLIC BLOOD PRESSURE: 108 MMHG | DIASTOLIC BLOOD PRESSURE: 64 MMHG

## 2018-03-29 DIAGNOSIS — R05.9 COUGH: Primary | ICD-10-CM

## 2018-03-29 PROCEDURE — 99213 OFFICE O/P EST LOW 20 MIN: CPT | Performed by: FAMILY MEDICINE

## 2018-03-29 PROCEDURE — G0463 HOSPITAL OUTPT CLINIC VISIT: HCPCS

## 2018-03-29 RX ORDER — CODEINE PHOSPHATE AND GUAIFENESIN 10; 100 MG/5ML; MG/5ML
2 SOLUTION ORAL EVERY 4 HOURS PRN
Qty: 120 ML | Refills: 3 | Status: SHIPPED | OUTPATIENT
Start: 2018-03-29 | End: 2018-09-13

## 2018-03-29 NOTE — NURSING NOTE
Patient here for cough, chest and nasal congestion. Jaquelin Aguilera LPN .......................3/29/2018  2:50 PM

## 2018-03-29 NOTE — MR AVS SNAPSHOT
"              After Visit Summary   3/29/2018    Tenzin Ca    MRN: [id number]           Patient Information     Date Of Birth          1975        Visit Information        Provider Department      3/29/2018 2:45 PM Ervin Hassan MD [facility]        Today's Diagnoses     Cough    -  1       Follow-ups after your visit        Who to contact     If you have questions or need follow up information about today's clinic visit or your schedule please contact [facility] directly at [phone].  Normal or non-critical lab and imaging results will be communicated to you by Connectloudhart, letter or phone within 4 business days after the clinic has received the results. If you do not hear from us within 7 days, please contact the clinic through Connectloudhart or phone. If you have a critical or abnormal lab result, we will notify you by phone as soon as possible.  Submit refill requests through Help/Systems or call your pharmacy and they will forward the refill request to us. Please allow 3 business days for your refill to be completed.          Additional Information About Your Visit        MyChart Information     Help/Systems lets you send messages to your doctor, view your test results, renew your prescriptions, schedule appointments and more. To sign up, go to [email or web address] . Click on \"Log in\" on the left side of the screen, which will take you to the Welcome page. Then click on \"Sign up Now\" on the right side of the page.     You will be asked to enter the access code listed below, as well as some personal information. Please follow the directions to create your username and password.     Your access code is: [email or web address]  Expires: 2018 12:56 PM     Your access code will  in 90 days. If you need help or a new code, please call your [facility] or [phone].        Care EveryWhere ID     This is your Care EveryWhere ID. This could be used by other " organizations to access your [place] medical records  [id number]        Your Vitals Were     Pulse Temperature BMI (Body Mass Index)             64 98  F (36.7  C) 39.71 kg/m2          Blood Pressure from Last 3 Encounters:   03/29/18 108/64   02/22/18 102/70   02/21/17 108/76    Weight from Last 3 Encounters:   03/29/18 265 lb (120.2 kg)   02/22/18 266 lb 6.4 oz (120.8 kg)   02/21/17 280 lb (127 kg)              Today, you had the following     No orders found for display         Today's Medication Changes          These changes are accurate as of 3/29/18  3:28 PM.  If you have any questions, ask your nurse or doctor.               Start taking these medicines.        Dose/Directions    guaiFENesin-codeine 100-10 MG/5ML Soln solution   Commonly known as:  ROBITUSSIN AC   Used for:  Cough   Started by:  Ervin Hassan MD        Dose:  2 tsp.   Take 10 mLs by mouth every 4 hours as needed for cough   Quantity:  120 mL   Refills:  3         These medicines have changed or have updated prescriptions.        Dose/Directions    ARIPiprazole 20 MG tablet   Commonly known as:  ABILIFY   This may have changed:  Another medication with the same name was removed. Continue taking this medication, and follow the directions you see here.   Changed by:  Ervin Hassan MD        Dose:  20 mg   Take 20 mg by mouth every morning   Refills:  5       CLONAZEPAM PO   This may have changed:  Another medication with the same name was removed. Continue taking this medication, and follow the directions you see here.   Changed by:  Ervin Hassan MD        Dose:  2 mg   Take 2 mg by mouth 2 times daily   Refills:  0       D 5000 5000 UNITS Caps   This may have changed:  Another medication with the same name was removed. Continue taking this medication, and follow the directions you see here.   Generic drug:  cholecalciferol   Changed by:  Ervin Hassan MD        Dose:  5000 Units   Take 5,000 Units by mouth daily   Refills:   0       FISH OIL PO   This may have changed:  Another medication with the same name was removed. Continue taking this medication, and follow the directions you see here.   Changed by:  Ervin Hassan MD        Dose:  1 capsule   Take 1 capsule by mouth daily   Refills:  0       hydrOXYzine 100 MG Caps   This may have changed:  Another medication with the same name was removed. Continue taking this medication, and follow the directions you see here.   Changed by:  Ervin Hassan MD        Dose:  1 capsule   Take 1 capsule by mouth 2 times daily   Refills:  0       * OXcarbazepine 600 MG tablet   Commonly known as:  TRILEPTAL   This may have changed:  Another medication with the same name was removed. Continue taking this medication, and follow the directions you see here.   Changed by:  Ervin Hassan MD        Dose:  600 mg   Take 600 mg by mouth At Bedtime   Refills:  0       * OXcarbazepine 300 MG tablet   Commonly known as:  TRILEPTAL   This may have changed:  Another medication with the same name was removed. Continue taking this medication, and follow the directions you see here.   Changed by:  Ervin Hassan MD        Dose:  300 mg   Take 300 mg by mouth every morning   Refills:  0       senna-docusate 8.6-50 MG per tablet   Commonly known as:  SENOKOT-S;PERICOLACE   This may have changed:  Another medication with the same name was removed. Continue taking this medication, and follow the directions you see here.   Used for:  Schizoaffective disorder, bipolar type (H)   Changed by:  Ervin Hassan MD        Dose:  1 tablet   Take 1 tablet by mouth 2 times daily   Quantity:  60 tablet   Refills:  0       * Notice:  This list has 2 medication(s) that are the same as other medications prescribed for you. Read the directions carefully, and ask your doctor or other care provider to review them with you.      Stop taking these medicines if you haven't already. Please contact your care team if you have  questions.     divalproex sodium delayed-release 250 MG DR tablet   Commonly known as:  DEPAKOTE   Stopped by:  Ervin Hassan MD           INVEGA SUSTENNA 117 MG/0.75ML Susp   Generic drug:  paliperidone   Stopped by:  Ervin Hassan MD           trihexyphenidyl 2 MG tablet   Commonly known as:  ARTANE   Stopped by:  Ervin Hassan MD           TRIHEXYPHENIDYL HCL PO   Stopped by:  Ervin Hassan MD           ziprasidone 40 MG capsule   Commonly known as:  GEODON   Stopped by:  Ervin Hassan MD           ziprasidone 80 MG capsule   Commonly known as:  GEODON   Stopped by:  Ervin Hassan MD                Where to get your medicines      Some of these will need a paper prescription and others can be bought over the counter.  Ask your nurse if you have questions.     Bring a paper prescription for each of these medications     guaiFENesin-codeine 100-10 MG/5ML Soln solution                Primary Care Provider Office Phone # Fax #    Ervin Hassan [phone]       [address] [place] [address]        Equal Access to Services     [facility]: Hadii aad ku hadasho Soomaali, waaxda luqadaha, qaybta kaalmada adeegyaisabella, nahid vicente . So [facility] [phone].    ATENCIÓN: Si habla español, tiene a swanson disposición servicios gratuitos de asistencia lingüística. Llame al [phone].    We comply with applicable federal civil rights laws and [place] laws. We do not discriminate on the basis of race, color, national origin, age, disability, sex, sexual orientation, or gender identity.            Thank you!     Thank you for choosing [facility] AND [facility]  for your care. Our goal is always to provide you with excellent care. Hearing back from our patients is one way we can continue to improve our services. Please take a few minutes to complete the written survey that you may receive in the mail after your visit with us. Thank you!              Your Updated Medication List - Protect others around you: Learn how to safely use, store and throw away your medicines at www.disposemymeds.org.          This list is accurate as of 3/29/18  3:28 PM.  Always use your most recent med list.                   Brand Name Dispense Instructions for use Diagnosis    ARIPiprazole 20 MG tablet    ABILIFY     Take 20 mg by mouth every morning        CLONAZEPAM PO      Take 2 mg by mouth 2 times daily        D 5000 5000 UNITS Caps   Generic drug:  cholecalciferol      Take 5,000 Units by mouth daily        FISH OIL PO      Take 1 capsule by mouth daily        guaiFENesin-codeine 100-10 MG/5ML Soln solution    ROBITUSSIN AC    120 mL    Take 10 mLs by mouth every 4 hours as needed for cough    Cough       hydrOXYzine 100 MG Caps      Take 1 capsule by mouth 2 times daily        * OXcarbazepine 600 MG tablet    TRILEPTAL     Take 600 mg by mouth At Bedtime        * OXcarbazepine 300 MG tablet    TRILEPTAL     Take 300 mg by mouth every morning        senna-docusate 8.6-50 MG per tablet    SENOKOT-S;PERICOLACE    60 tablet    Take 1 tablet by mouth 2 times daily    Schizoaffective disorder, bipolar type (H)       zolpidem 10 MG tablet    AMBIEN     Take 10 mg by mouth At Bedtime        * Notice:  This list has 2 medication(s) that are the same as other medications prescribed for you. Read the directions carefully, and ask your doctor or other care provider to review them with you.

## 2018-03-29 NOTE — PROGRESS NOTES
SUBJECTIVE:   Tenzin Ca is a 42 year old male who presents to clinic today for the following health issues: Cough    HPI Comments: Patient arrives here for cough congestion.  This been going on for 1 week.  Reports getting a little worse.  Episode of emesis and gagging.  C sore throat is typically with coughing only.        Patient Active Problem List    Diagnosis Date Noted     Cough 03/29/2018     Priority: Medium     Vitamin B12 deficiency (non anemic) 02/22/2018     Priority: Medium     Slow transit constipation 02/22/2018     Priority: Medium     Hypertriglyceridemia 02/20/2018     Priority: Medium     Major depressive disorder, recurrent episode (H) 02/20/2018     Priority: Medium     Plica of knee 02/20/2018     Priority: Medium     Psoriasis 02/20/2018     Priority: Medium     Schizophrenia (H) 02/20/2018     Priority: Medium     Overview:   hears voices       Torn medial meniscus 02/20/2018     Priority: Medium     Obesity 02/21/2017     Priority: Medium     S/P left knee arthroscopy 07/05/2016     Priority: Medium     Chronic pain of left knee 04/07/2016     Priority: Medium     Psychosis 07/02/2015     Priority: Medium     Erectile dysfunction 04/23/2014     Priority: Medium     Anal fissure 06/03/2013     Priority: Medium     Delayed ejaculation 06/03/2013     Priority: Medium     Sprain of lumbar region 06/21/2012     Priority: Medium     Past Medical History:   Diagnosis Date     Mental disorder     Disability related to mental illness     Personal history of other mental and behavioral disorders     2016        Review of Systems     OBJECTIVE:     /64  Pulse 64  Temp 98  F (36.7  C)  Wt 265 lb (120.2 kg)  BMI 39.71 kg/m2  Body mass index is 39.71 kg/(m^2).  Physical Exam   Constitutional: He appears well-developed.   HENT:   Right Ear: External ear normal.   Mouth/Throat: Oropharynx is clear and moist. No oropharyngeal exudate.   Nasal congestion   Cardiovascular: Normal rate and  regular rhythm.    Pulmonary/Chest: Effort normal and breath sounds normal. No respiratory distress.   Neurological: He is alert.       none     ASSESSMENT/PLAN:         1. Cough  Likely viral in origin.  Follow-up if getting worse.guaiFENesin-codeine (ROBITUSSIN AC) 100-10 MG/5ML SOLN solution; Take 10 mLs by mouth every 4 hours as needed for cough  Dispense: 120 mL; Refill: 3      Ervin Hassan MD  [facility] AND [facility]

## 2018-05-01 ENCOUNTER — MEDICAL CORRESPONDENCE (OUTPATIENT)
Dept: HEALTH INFORMATION MANAGEMENT | Facility: OTHER | Age: 43
End: 2018-05-01

## 2018-05-03 DIAGNOSIS — E66.9 LIFELONG OBESITY: ICD-10-CM

## 2018-05-03 DIAGNOSIS — F20.0 PARANOID SCHIZOPHRENIA (H): Primary | ICD-10-CM

## 2018-05-03 DIAGNOSIS — F33.9 MAJOR DEPRESSIVE DISORDER, RECURRENT EPISODE (H): ICD-10-CM

## 2018-05-03 DIAGNOSIS — E53.8 BIOTIN-(PROPIONYL-COA-CARBOXYLASE) LIGASE DEFICIENCY: ICD-10-CM

## 2018-05-03 DIAGNOSIS — Z00.00 ROUTINE GENERAL MEDICAL EXAMINATION AT A HEALTH CARE FACILITY: ICD-10-CM

## 2018-05-03 DIAGNOSIS — E78.1 PURE HYPERGLYCERIDEMIA: ICD-10-CM

## 2018-05-03 LAB
ALBUMIN SERPL-MCNC: 4.3 G/DL (ref 3.5–5.7)
ALP SERPL-CCNC: 72 U/L (ref 34–104)
ALT SERPL W P-5'-P-CCNC: 27 U/L (ref 7–52)
ANION GAP SERPL CALCULATED.3IONS-SCNC: 7 MMOL/L (ref 3–14)
AST SERPL W P-5'-P-CCNC: 20 U/L (ref 13–39)
BASOPHILS # BLD AUTO: 0 10E9/L (ref 0–0.2)
BASOPHILS NFR BLD AUTO: 0.8 %
BILIRUB SERPL-MCNC: 0.5 MG/DL (ref 0.3–1)
BUN SERPL-MCNC: 6 MG/DL (ref 7–25)
CALCIUM SERPL-MCNC: 9.8 MG/DL (ref 8.6–10.3)
CHLORIDE SERPL-SCNC: 105 MMOL/L (ref 98–107)
CHOLEST SERPL-MCNC: 179 MG/DL
CO2 SERPL-SCNC: 28 MMOL/L (ref 21–31)
CREAT SERPL-MCNC: 1.12 MG/DL (ref 0.7–1.3)
DEPRECATED CALCIDIOL+CALCIFEROL SERPL-MC: 61.8 NG/ML
DIFFERENTIAL METHOD BLD: NORMAL
EOSINOPHIL # BLD AUTO: 0.2 10E9/L (ref 0–0.7)
EOSINOPHIL NFR BLD AUTO: 3 %
ERYTHROCYTE [DISTWIDTH] IN BLOOD BY AUTOMATED COUNT: 12.3 % (ref 10–15)
GFR SERPL CREATININE-BSD FRML MDRD: 72 ML/MIN/1.7M2
GLUCOSE SERPL-MCNC: 103 MG/DL (ref 70–105)
HBA1C MFR BLD: 5.5 % (ref 4–6)
HCT VFR BLD AUTO: 41.6 % (ref 40–53)
HDLC SERPL-MCNC: 25 MG/DL (ref 23–92)
HGB BLD-MCNC: 14.8 G/DL (ref 13.3–17.7)
IMM GRANULOCYTES # BLD: 0 10E9/L (ref 0–0.4)
IMM GRANULOCYTES NFR BLD: 0.6 %
LDLC SERPL CALC-MCNC: 111 MG/DL
LYMPHOCYTES # BLD AUTO: 1.6 10E9/L (ref 0.8–5.3)
LYMPHOCYTES NFR BLD AUTO: 32.5 %
MCH RBC QN AUTO: 29.4 PG (ref 26.5–33)
MCHC RBC AUTO-ENTMCNC: 35.6 G/DL (ref 31.5–36.5)
MCV RBC AUTO: 83 FL (ref 78–100)
MONOCYTES # BLD AUTO: 0.4 10E9/L (ref 0–1.3)
MONOCYTES NFR BLD AUTO: 8.3 %
NEUTROPHILS # BLD AUTO: 2.7 10E9/L (ref 1.6–8.3)
NEUTROPHILS NFR BLD AUTO: 54.8 %
NONHDLC SERPL-MCNC: 154 MG/DL
PLATELET # BLD AUTO: 193 10E9/L (ref 150–450)
POTASSIUM SERPL-SCNC: 4.4 MMOL/L (ref 3.5–5.1)
PROT SERPL-MCNC: 7.3 G/DL (ref 6.4–8.9)
RBC # BLD AUTO: 5.03 10E12/L (ref 4.4–5.9)
SODIUM SERPL-SCNC: 140 MMOL/L (ref 134–144)
T3FREE SERPL-MCNC: 4.2 PG/ML (ref 2.5–3.9)
T4 FREE SERPL-MCNC: 0.68 NG/DL (ref 0.6–1.6)
TRIGL SERPL-MCNC: 217 MG/DL
TSH SERPL DL<=0.05 MIU/L-ACNC: 2.42 IU/ML (ref 0.34–5.6)
VIT B12 SERPL-MCNC: 388 PG/ML (ref 180–914)
WBC # BLD AUTO: 4.9 10E9/L (ref 4–11)

## 2018-05-03 PROCEDURE — 80053 COMPREHEN METABOLIC PANEL: CPT

## 2018-05-03 PROCEDURE — 82607 VITAMIN B-12: CPT

## 2018-05-03 PROCEDURE — 84481 FREE ASSAY (FT-3): CPT

## 2018-05-03 PROCEDURE — 84443 ASSAY THYROID STIM HORMONE: CPT

## 2018-05-03 PROCEDURE — 82306 VITAMIN D 25 HYDROXY: CPT

## 2018-05-03 PROCEDURE — 36415 COLL VENOUS BLD VENIPUNCTURE: CPT

## 2018-05-03 PROCEDURE — 83036 HEMOGLOBIN GLYCOSYLATED A1C: CPT

## 2018-05-03 PROCEDURE — 85025 COMPLETE CBC W/AUTO DIFF WBC: CPT

## 2018-05-03 PROCEDURE — 84439 ASSAY OF FREE THYROXINE: CPT

## 2018-05-03 PROCEDURE — 80061 LIPID PANEL: CPT

## 2018-07-02 DIAGNOSIS — Z00.00 HEALTH CARE MAINTENANCE: Primary | ICD-10-CM

## 2018-07-03 RX ORDER — OMEGA-3 FATTY ACIDS/FISH OIL 300-1000MG
1 CAPSULE ORAL DAILY
Qty: 100 CAPSULE | Refills: 2 | Status: SHIPPED | OUTPATIENT
Start: 2018-07-03 | End: 2018-12-26

## 2018-07-03 NOTE — TELEPHONE ENCOUNTER
Last OV 3/29/18 with PCP. [place] 3 approved per [facility]. Refill authorized for 100 days and 2 refill at this time. Marilou Duncan RN on 7/3/2018 at 4:43 PM

## 2018-07-23 NOTE — PROGRESS NOTES
"Patient Information     Patient Name  Tenzin Ca MRN  [id number] Sex  Male   1975      Letter by Robin Smith MD at      Author:  Robin Smith MD Service:  (none) Author Type:  (none)    Filed:   Encounter Date:  2017 Status:  (Other)           Tenzin Ca  [address]  [address] [address]          2017    Dear Mr. Ca:    Welcome to [email or web address]!   Remember to activate your account quickly -  Your activation code expires in 45 days!    With [employer], you can view your health information, email your provider, schedule clinic appointments, get after visit instructions and more - online, anytime.     To activate your [email or web address] account, you will need:    * to visit [email or web address]  * your [email or web address] activation code: [email or web address]  Expires: 2017  7:49 AM   * the last four digits of your Social Security number (SSN)   * your date of birth.     Step-by-step instructions on how to set up your [email or web address] account are shown on the following page. If you requested access to your child/dependent s [employer] account or you have been granted access to another adult s [employer]In[employer] account, instructions for viewing their information are also on the following page.     For questions or technical assistance, call [phone].    Thank you for choosing [employer] activation instructions     Activation code: [id number]  Expires: 2017  7:49 AM     Step 1: Go to [email or web address].     Step 2: Click on Enter your activation code.     Step 3: Type in your activation code (provided above), the last four digits of your Social Security number (SSN) and date of birth. This information is only required once. The next time you sign in to your account you will only need your username and password. If you receive an error that states \"Invalid Social Security number  or  Invalid date of birth\" that means the information we have on file does not match the " information entered. Please call [phone] for assistance.     Step 4: Choose a username that is easy for you to remember, but hard for others to guess. Your username must:   * be between five and 24 characters   * contain only letters and numbers (no symbols).   Once selected, your username cannot be changed.     Step 5: Choose a password. Your password must:   * be at least eight characters   * contain at least one uppercase letter   * contain one lowercase letter   * contain one number or symbol   * be different than your username.     Step 6: Choose a security question. This will allow you to reset your password.     Step 7: Enter the answer to your security question. The answer cannot be the same as your password.     Step 8: Enter your email address. You will receive email notifications when new information is available in [employer]. You are now ready to start using [employer]!     If you requested proxy access for a child s or another adult s [employer] account, you will be able to access their health record by clicking on their name    Instructions for Child/Dependent Access  To view your child s record, click on your child's name under your name on the right hand side of the screen.   Instructions for Adult Proxy Access  To view your adult proxy record, click on the adult's name under your name on the right hand side of the screen.    In the event you have technical difficulties, please call   [employer] at [phone].

## 2018-09-13 ENCOUNTER — OFFICE VISIT (OUTPATIENT)
Dept: FAMILY MEDICINE | Facility: OTHER | Age: 43
End: 2018-09-13
Attending: FAMILY MEDICINE
Payer: MEDICARE

## 2018-09-13 VITALS
OXYGEN SATURATION: 98 % | BODY MASS INDEX: 38.27 KG/M2 | HEART RATE: 77 BPM | SYSTOLIC BLOOD PRESSURE: 110 MMHG | DIASTOLIC BLOOD PRESSURE: 68 MMHG | WEIGHT: 255.4 LBS | TEMPERATURE: 98.3 F

## 2018-09-13 DIAGNOSIS — B34.9 VIRAL SYNDROME: Primary | ICD-10-CM

## 2018-09-13 PROCEDURE — G0463 HOSPITAL OUTPT CLINIC VISIT: HCPCS

## 2018-09-13 PROCEDURE — 99213 OFFICE O/P EST LOW 20 MIN: CPT | Performed by: FAMILY MEDICINE

## 2018-09-13 NOTE — NURSING NOTE
Patient here for dizziness, sore throat and emesis. Jaquelin Aguilera LPN .......................9/13/2018  2:50 PM

## 2018-09-13 NOTE — MR AVS SNAPSHOT
After Visit Summary   9/13/2018    Tenzin Ca    MRN: [id number]           Patient Information     Date Of Birth          1975        Visit Information        Provider Department      9/13/2018 3:00 PM Ervin Hassan MD [facility]        Today's Diagnoses     Viral syndrome    -  1       Follow-ups after your visit        Who to contact     If you have questions or need follow up information about today's clinic visit or your schedule please contact [facility] directly at [phone].  Normal or non-critical lab and imaging results will be communicated to you by MyChart, letter or phone within 4 business days after the clinic has received the results. If you do not hear from us within 7 days, please contact the clinic through MyChart or phone. If you have a critical or abnormal lab result, we will notify you by phone as soon as possible.  Submit refill requests through RetailMLS or call your pharmacy and they will forward the refill request to us. Please allow 3 business days for your refill to be completed.          Additional Information About Your Visit        Care EveryWhere ID     This is your Care EveryWhere ID. This could be used by other organizations to access your [place] medical records  [id number]        Your Vitals Were     Pulse Temperature Pulse Oximetry BMI (Body Mass Index)          77 98.3  F (36.8  C) 98% 38.27 kg/m2         Blood Pressure from Last 3 Encounters:   09/13/18 110/68   03/29/18 108/64   02/22/18 102/70    Weight from Last 3 Encounters:   09/13/18 255 lb 6.4 oz (115.8 kg)   03/29/18 265 lb (120.2 kg)   02/22/18 266 lb 6.4 oz (120.8 kg)              Today, you had the following     No orders found for display         Today's Medication Changes          These changes are accurate as of 9/13/18 11:59 PM.  If you have any questions, ask your nurse or doctor.               Stop taking these medicines if you haven't  already. Please contact your care team if you have questions.     guaiFENesin-codeine 100-10 MG/5ML Soln solution   Commonly known as:  ROBITUSSIN AC   Stopped by:  Ervin Hassan MD                    Primary Care Provider Office Phone # Fax #    Ervin Hassan [phone] [address]  [place] [address]        Equal Access to Services     [facility]: Hadii aad ku hadasho Soomaali, waaxda luqadaha, qaybta kaalmada adeegyada, waxay idiin hayaan adeeg kharash la'aan ah. So [facility] [phone].    ATENCIÓN: Si habla español, tiene a swanson disposición servicios gratuitos de asistencia lingüística. Phillipame al [phone].    We comply with applicable federal civil rights laws and [place] laws. We do not discriminate on the basis of race, color, national origin, age, disability, sex, sexual orientation, or gender identity.            Thank you!     Thank you for choosing [facility] AND [facility]  for your care. Our goal is always to provide you with excellent care. Hearing back from our patients is one way we can continue to improve our services. Please take a few minutes to complete the written survey that you may receive in the mail after your visit with us. Thank you!             Your Updated Medication List - Protect others around you: Learn how to safely use, store and throw away your medicines at www.disposemymeds.org.          This list is accurate as of 9/13/18 11:59 PM.  Always use your most recent med list.                   Brand Name Dispense Instructions for use Diagnosis    ARIPiprazole 20 MG tablet    ABILIFY     Take 20 mg by mouth every morning        CLONAZEPAM PO      Take 2 mg by mouth 2 times daily        D 5000 5000 units Caps   Generic drug:  cholecalciferol      Take 5,000 Units by mouth daily        * FISH OIL PO      Take 1 capsule by mouth daily        * omega 3 1000 MG Caps     100 capsule    Take 1 g by mouth daily    Health care maintenance        hydrOXYzine 100 MG Caps      Take 1 capsule by mouth 2 times daily        * OXcarbazepine 600 MG tablet    TRILEPTAL     Take 600 mg by mouth At Bedtime        * OXcarbazepine 300 MG tablet    TRILEPTAL     Take 300 mg by mouth every morning        senna-docusate 8.6-50 MG per tablet    SENOKOT-S;PERICOLACE    60 tablet    Take 1 tablet by mouth 2 times daily    Schizoaffective disorder, bipolar type (H)       zolpidem 10 MG tablet    AMBIEN     Take 10 mg by mouth At Bedtime        * Notice:  This list has 4 medication(s) that are the same as other medications prescribed for you. Read the directions carefully, and ask your doctor or other care provider to review them with you.

## 2018-09-14 PROBLEM — B34.9 VIRAL SYNDROME: Status: ACTIVE | Noted: 2018-09-14

## 2018-09-14 ASSESSMENT — ENCOUNTER SYMPTOMS: DIZZINESS: 1

## 2018-09-14 NOTE — PROGRESS NOTES
SUBJECTIVE:   Tenzin Ca is a 43 year old male who presents to clinic today for the following health issues: Lightheadedness    HPI Comments: Patient arrives here for lightheadedness sore throat some nausea.  He reports that he is getting better.  No exposures.  He is reporting fevers and chills.  He states he has difficulty sitting in a car going faster than 50 miles an hour where he gets dizzy but this is been going on for years.  No change.    Dizziness           Patient Active Problem List    Diagnosis Date Noted     Cough 03/29/2018     Priority: Medium     Vitamin B12 deficiency (non anemic) 02/22/2018     Priority: Medium     Slow transit constipation 02/22/2018     Priority: Medium     Hypertriglyceridemia 02/20/2018     Priority: Medium     Major depressive disorder, recurrent episode (H) 02/20/2018     Priority: Medium     Plica of knee 02/20/2018     Priority: Medium     Psoriasis 02/20/2018     Priority: Medium     Schizophrenia (H) 02/20/2018     Priority: Medium     Overview:   hears voices       Torn medial meniscus 02/20/2018     Priority: Medium     Obesity 02/21/2017     Priority: Medium     S/P left knee arthroscopy 07/05/2016     Priority: Medium     Chronic pain of left knee 04/07/2016     Priority: Medium     Psychosis 07/02/2015     Priority: Medium     Erectile dysfunction 04/23/2014     Priority: Medium     Anal fissure 06/03/2013     Priority: Medium     Delayed ejaculation 06/03/2013     Priority: Medium     Sprain of lumbar region 06/21/2012     Priority: Medium     Past Medical History:   Diagnosis Date     Mental disorder     Disability related to mental illness     Personal history of other mental and behavioral disorders     2016      Past Surgical History:   Procedure Laterality Date     ARTHROSCOPY KNEE      Left Knee Arthroscopy and Partial Meniscectomy and Chondroplasty     Current Outpatient Prescriptions   Medication Sig Dispense Refill     ARIPiprazole (ABILIFY) 20 MG  tablet Take 20 mg by mouth every morning  5     cholecalciferol (D 5000) 5000 UNITS CAPS Take 5,000 Units by mouth daily       CLONAZEPAM PO Take 2 mg by mouth 2 times daily       hydrOXYzine 100 MG CAPS Take 1 capsule by mouth 2 times daily       omega 3 1000 MG CAPS Take 1 g by mouth daily 100 capsule 2     Omega-3 Fatty Acids (FISH OIL PO) Take 1 capsule by mouth daily       OXcarbazepine (TRILEPTAL) 300 MG tablet Take 300 mg by mouth every morning       OXcarbazepine (TRILEPTAL) 600 MG tablet Take 600 mg by mouth At Bedtime       senna-docusate (SENOKOT-S;PERICOLACE) 8.6-50 MG per tablet Take 1 tablet by mouth 2 times daily 60 tablet 0     zolpidem (AMBIEN) 10 MG tablet Take 10 mg by mouth At Bedtime       No Known Allergies    Review of Systems   Neurological: Positive for dizziness.        OBJECTIVE:     /68 (BP Location: Right arm, Patient Position: Sitting)  Pulse 77  Temp 98.3  F (36.8  C)  Wt 255 lb 6.4 oz (115.8 kg)  SpO2 98%  BMI 38.27 kg/m2  Body mass index is 38.27 kg/(m^2).  Physical Exam   Constitutional: He appears well-developed.   HENT:   Right Ear: External ear normal.   Left Ear: External ear normal.   Throat was only slightly red   Eyes: Pupils are equal, round, and reactive to light.   Cardiovascular: Normal rate, regular rhythm and normal heart sounds.    Pulmonary/Chest: Effort normal and breath sounds normal.   Abdominal: Soft.   Lymphadenopathy:     He has no cervical adenopathy.   Skin: Skin is warm.       none     ASSESSMENT/PLAN:           ICD-10-CM    1. Viral syndrome B34.9      Likely viral syndrome.  He is improving and recommended observation.  Follow-up if you should have any troubles.    Ervin Hassan MD  [facility]

## 2018-12-14 ENCOUNTER — APPOINTMENT (OUTPATIENT)
Dept: CT IMAGING | Facility: OTHER | Age: 43
End: 2018-12-14
Attending: FAMILY MEDICINE
Payer: MEDICARE

## 2018-12-14 ENCOUNTER — HOSPITAL ENCOUNTER (EMERGENCY)
Facility: OTHER | Age: 43
Discharge: HOME | End: 2018-12-14
Attending: FAMILY MEDICINE | Admitting: FAMILY MEDICINE
Payer: MEDICARE

## 2018-12-14 VITALS
HEART RATE: 88 BPM | SYSTOLIC BLOOD PRESSURE: 122 MMHG | WEIGHT: 255 LBS | HEIGHT: 68 IN | OXYGEN SATURATION: 97 % | TEMPERATURE: 96.9 F | DIASTOLIC BLOOD PRESSURE: 85 MMHG | BODY MASS INDEX: 38.65 KG/M2 | RESPIRATION RATE: 8 BRPM

## 2018-12-14 DIAGNOSIS — R42 LIGHTHEADEDNESS: ICD-10-CM

## 2018-12-14 DIAGNOSIS — R55 NEAR SYNCOPE: ICD-10-CM

## 2018-12-14 LAB
ALBUMIN SERPL-MCNC: 4 G/DL (ref 3.5–5.7)
ALBUMIN UR-MCNC: NEGATIVE MG/DL
ALP SERPL-CCNC: 61 U/L (ref 34–104)
ALT SERPL W P-5'-P-CCNC: 30 U/L (ref 7–52)
ANION GAP SERPL CALCULATED.3IONS-SCNC: 6 MMOL/L (ref 3–14)
APPEARANCE UR: CLEAR
AST SERPL W P-5'-P-CCNC: 25 U/L (ref 13–39)
BASOPHILS # BLD AUTO: 0.1 10E9/L (ref 0–0.2)
BASOPHILS NFR BLD AUTO: 0.8 %
BILIRUB SERPL-MCNC: 0.3 MG/DL (ref 0.3–1)
BILIRUB UR QL STRIP: NEGATIVE
BUN SERPL-MCNC: 13 MG/DL (ref 7–25)
CALCIUM SERPL-MCNC: 9 MG/DL (ref 8.6–10.3)
CHLORIDE SERPL-SCNC: 104 MMOL/L (ref 98–107)
CO2 SERPL-SCNC: 27 MMOL/L (ref 21–31)
COLOR UR AUTO: YELLOW
CREAT SERPL-MCNC: 1.01 MG/DL (ref 0.7–1.3)
DIFFERENTIAL METHOD BLD: ABNORMAL
EOSINOPHIL # BLD AUTO: 0.2 10E9/L (ref 0–0.7)
EOSINOPHIL NFR BLD AUTO: 3 %
ERYTHROCYTE [DISTWIDTH] IN BLOOD BY AUTOMATED COUNT: 11.9 % (ref 10–15)
GFR SERPL CREATININE-BSD FRML MDRD: 81 ML/MIN/1.7M2
GLUCOSE SERPL-MCNC: 133 MG/DL (ref 70–105)
GLUCOSE UR STRIP-MCNC: NEGATIVE MG/DL
HCT VFR BLD AUTO: 38.9 % (ref 40–53)
HGB BLD-MCNC: 13.6 G/DL (ref 13.3–17.7)
HGB UR QL STRIP: NEGATIVE
IMM GRANULOCYTES # BLD: 0 10E9/L (ref 0–0.4)
IMM GRANULOCYTES NFR BLD: 0.3 %
KETONES UR STRIP-MCNC: NEGATIVE MG/DL
LEUKOCYTE ESTERASE UR QL STRIP: NEGATIVE
LYMPHOCYTES # BLD AUTO: 2.1 10E9/L (ref 0.8–5.3)
LYMPHOCYTES NFR BLD AUTO: 34.9 %
MCH RBC QN AUTO: 29.1 PG (ref 26.5–33)
MCHC RBC AUTO-ENTMCNC: 35 G/DL (ref 31.5–36.5)
MCV RBC AUTO: 83 FL (ref 78–100)
MONOCYTES # BLD AUTO: 0.5 10E9/L (ref 0–1.3)
MONOCYTES NFR BLD AUTO: 8.1 %
NEUTROPHILS # BLD AUTO: 3.1 10E9/L (ref 1.6–8.3)
NEUTROPHILS NFR BLD AUTO: 52.9 %
NITRATE UR QL: NEGATIVE
PH UR STRIP: 6.5 PH (ref 5–9)
PLATELET # BLD AUTO: 178 10E9/L (ref 150–450)
POTASSIUM SERPL-SCNC: 3.5 MMOL/L (ref 3.5–5.1)
PROT SERPL-MCNC: 6.8 G/DL (ref 6.4–8.9)
RBC # BLD AUTO: 4.67 10E12/L (ref 4.4–5.9)
SODIUM SERPL-SCNC: 137 MMOL/L (ref 134–144)
SOURCE: ABNORMAL
SP GR UR STRIP: >1.03 (ref 1–1.03)
TROPONIN I SERPL-MCNC: <0.03 UG/L (ref 0–0.03)
UROBILINOGEN UR STRIP-ACNC: 0.2 EU/DL (ref 0.2–1)
WBC # BLD AUTO: 5.9 10E9/L (ref 4–11)

## 2018-12-14 PROCEDURE — 25000128 H RX IP 250 OP 636: Performed by: FAMILY MEDICINE

## 2018-12-14 PROCEDURE — 70450 CT HEAD/BRAIN W/O DYE: CPT

## 2018-12-14 PROCEDURE — 87591 N.GONORRHOEAE DNA AMP PROB: CPT | Performed by: FAMILY MEDICINE

## 2018-12-14 PROCEDURE — 93010 ELECTROCARDIOGRAM REPORT: CPT | Performed by: INTERNAL MEDICINE

## 2018-12-14 PROCEDURE — 99285 EMERGENCY DEPT VISIT HI MDM: CPT | Mod: 25 | Performed by: FAMILY MEDICINE

## 2018-12-14 PROCEDURE — 36415 COLL VENOUS BLD VENIPUNCTURE: CPT | Performed by: FAMILY MEDICINE

## 2018-12-14 PROCEDURE — 93005 ELECTROCARDIOGRAM TRACING: CPT | Performed by: FAMILY MEDICINE

## 2018-12-14 PROCEDURE — 96360 HYDRATION IV INFUSION INIT: CPT | Performed by: FAMILY MEDICINE

## 2018-12-14 PROCEDURE — 84484 ASSAY OF TROPONIN QUANT: CPT | Performed by: FAMILY MEDICINE

## 2018-12-14 PROCEDURE — 99284 EMERGENCY DEPT VISIT MOD MDM: CPT | Mod: Z6 | Performed by: FAMILY MEDICINE

## 2018-12-14 PROCEDURE — 87491 CHLMYD TRACH DNA AMP PROBE: CPT | Performed by: FAMILY MEDICINE

## 2018-12-14 PROCEDURE — 80053 COMPREHEN METABOLIC PANEL: CPT | Performed by: FAMILY MEDICINE

## 2018-12-14 PROCEDURE — 85025 COMPLETE CBC W/AUTO DIFF WBC: CPT | Performed by: FAMILY MEDICINE

## 2018-12-14 PROCEDURE — 81003 URINALYSIS AUTO W/O SCOPE: CPT | Performed by: FAMILY MEDICINE

## 2018-12-14 RX ADMIN — SODIUM CHLORIDE 1000 ML: 900 INJECTION, SOLUTION INTRAVENOUS at 22:21

## 2018-12-14 ASSESSMENT — ENCOUNTER SYMPTOMS
RHINORRHEA: 0
WEAKNESS: 1
SHORTNESS OF BREATH: 0
CHILLS: 0
DIARRHEA: 0
BACK PAIN: 0
FEVER: 0
NAUSEA: 0
SORE THROAT: 0
ABDOMINAL PAIN: 0
PALPITATIONS: 0
BRUISES/BLEEDS EASILY: 0
LIGHT-HEADEDNESS: 1
SPEECH DIFFICULTY: 0
HEADACHES: 1
APPETITE CHANGE: 0
NECK PAIN: 0
DYSURIA: 0
DIAPHORESIS: 0
FATIGUE: 1
NUMBNESS: 0
COUGH: 0
VOMITING: 0

## 2018-12-14 ASSESSMENT — MIFFLIN-ST. JEOR: SCORE: 2031.17

## 2018-12-14 NOTE — ED AVS SNAPSHOT
[facility]  [address]  [place] [address]  Phone:  [phone]  Fax:  [phone]                                    Tenzin Ca   MRN: [id number]    Department:  [facility]   Date of Visit:  12/14/2018           After Visit Summary Signature Page    I have received my discharge instructions, and my questions have been answered. I have discussed any challenges I see with this plan with the nurse or doctor.    ..........................................................................................................................................  Patient/Patient Representative Signature      ..........................................................................................................................................  Patient Representative Print Name and Relationship to Patient    ..................................................               ................................................  Date                                   Time    ..........................................................................................................................................  Reviewed by Signature/Title    ...................................................              ..............................................  Date                                               Time          22EPIC Rev 08/18

## 2018-12-14 NOTE — ED AVS SNAPSHOT
"    [facility]: [phone]                                              INTERAGENCY TRANSFER FORM - LAB / IMAGING / EKG / EMG RESULTS   2018                   Hospital Admission Date: 2018  CECE PORRAS   : 1975  Sex: Male         Attending Provider:  Paul Darnell MD    Allergies:  No Known Allergies    Infection:  None   Service:  EMERGENCY ME    Ht:  1.727 m (5' 8\")   Wt:  115.7 kg (255 lb)   Admission Wt:  115.7 kg (255 lb)    BMI:  38.77 kg/m 2   BSA:  2.36 m 2            Patient PCP Information     Provider PCP Type    Ervin Hassan MD General         Lab Results - 3 Days      *UA reflex to Microscopic [[id number]] (Abnormal)  Resulted: 18, Result status: Final result   Ordering provider:  Paul Darnell MD  18 Resulting lab:  [facility]    Specimen Information    Type Source Collected On   Urine   18          Components    Component Value Reference Range Flag Lab   Color Urine Yellow     [facility]   Appearance Urine Clear     [facility]   Glucose Urine Negative NEG^Negative mg/dL   [facility]   Bilirubin Urine Negative NEG^Negative   [facility]   Ketones Urine Negative NEG^Negative mg/dL   [facility]   Specific Gravity Urine >1.030 1.000 - 1.030 H [facility]   Blood Urine Negative NEG^Negative   [facility]   pH Urine 6.5 5.0 - 9.0 pH   [facility]   Protein Albumin Urine Negative NEG^Negative mg/dL   [facility]   Urobilinogen Urine 0.2 0.2 - 1.0 EU/dL   [facility]   Nitrite Urine Negative NEG^Negative   [facility]   Leukocyte Esterase Urine Negative NEG^Negative   [facility]   Source Urine     [facility]            Comprehensive metabolic panel [[id number]] (Abnormal)  Resulted: 18, Result status: Final result   Ordering provider:  Paul Darnell MD  18 Resulting lab:  [facility]    Specimen Information    Type Source Collected On   Blood   18 "          Components    Component Value Reference Range Flag Lab   Sodium 137 134 - 144 mmol/L   [facility]   Potassium 3.5 3.5 - 5.1 mmol/L   [facility]   Chloride 104 98 - 107 mmol/L   [facility]   Carbon Dioxide 27 21 - 31 mmol/L   [facility]   Anion Gap 6 3 - 14 mmol/L   [facility]   Glucose 133 70 - 105 mg/dL H [facility]   Urea Nitrogen 13 7 - 25 mg/dL   [facility]   Creatinine 1.01 0.70 - 1.30 mg/dL   [facility]   GFR Estimate 81 >60 mL/min/1.7m2   [facility]   GFR Estimate If Black >90 >60 mL/min/1.7m2   [facility]   Calcium 9.0 8.6 - 10.3 mg/dL   [facility]   Bilirubin Total 0.3 0.3 - 1.0 mg/dL   [facility]   Albumin 4.0 3.5 - 5.7 g/dL   [facility]   Protein Total 6.8 6.4 - 8.9 g/dL   [facility]   Alkaline Phosphatase 61 34 - 104 U/L   [facility]   ALT 30 7 - 52 U/L   [facility]   AST 25 13 - 39 U/L   [facility]            GC/Chlamydia by PCR - HI, [[id number]]  Resulted: 12/14/18 2235, Result status: In process   Ordering provider:  Paul Darnell MD  12/14/18 2200 Resulting lab:  [facility]    Specimen Information    Type Source Collected On       12/14/18 2200            Troponin I [[id number]]  Resulted: 12/14/18 2230, Result status: Final result   Ordering provider:  Paul Darnell MD  12/14/18 2201 Resulting lab:  [facility]    Specimen Information    Type Source Collected On   Blood   12/14/18 2210          Components    Component Value Reference Range Flag Lab   Troponin I ES <0.030 0.000 - 0.034 ug/L   [facility]            CBC with platelets differential [[id number]] (Abnormal)  Resulted: 12/14/18 2214, Result status: Final result   Ordering provider:  Paul Darnell MD  12/14/18 2201 Resulting lab:  [facility]    Specimen Information    Type Source Collected On   Blood   12/14/18 3837          Components    Component Value Reference Range Flag Lab   WBC 5.9 4.0 - 11.0 10e9/L   [facility]   RBC Count 4.67 4.4 - 5.9 10e12/L   [facility]   Hemoglobin  13.6 13.3 - 17.7 g/dL   [facility]   Hematocrit 38.9 40.0 - 53.0 %  [facility]   MCV 83 78 - 100 fl   [facility]   MCH 29.1 26.5 - 33.0 pg   [facility]   MCHC 35.0 31.5 - 36.5 g/dL   [facility]   RDW 11.9 10.0 - 15.0 %   [facility]   Platelet Count 178 150 - 450 10e9/L   [facility]   Diff Method Automated Method     [facility]   % Neutrophils 52.9 %   [facility]   % Lymphocytes 34.9 %   [facility]   % Monocytes 8.1 %   [facility]   % Eosinophils 3.0 %   [facility]   % Basophils 0.8 %   [facility]   % Immature Granulocytes 0.3 %   [facility]   Absolute Neutrophil 3.1 1.6 - 8.3 10e9/L   [facility]   Absolute Lymphocytes 2.1 0.8 - 5.3 10e9/L   [facility]   Absolute Monocytes 0.5 0.0 - 1.3 10e9/L   [facility]   Absolute Eosinophils 0.2 0.0 - 0.7 10e9/L   [facility]   Absolute Basophils 0.1 0.0 - 0.2 10e9/L   [facility]   Abs Immature Granulocytes 0.0 0 - 0.4 10e9/L   [facility]            Testing Performed By     Lab - Abbreviation Name Director Address Valid Date Range    1743 - [facility] Unknown [address]  [facility] [address] 08/07/15 0948 - Present            Unresulted Labs (24h ago, onward)    None         Imaging Results - 3 Days      CT Head w/o Contrast [[id number]]  Resulted: 12/14/18 2248, Result status: Final result   Ordering provider:  Paul Darnell MD  12/14/18 2201 Resulted by:  Ervin Siegel MD   Performed:  12/14/18 2239 - 12/14/18 2239 Accession number:  [id number]   Resulting lab:  RADIOLOGY RESULTS   Narrative:  PROCEDURE: CT HEAD W/O CONTRAST   12/14/2018 10:39 PM    HISTORY:Male, age,  43 years, , , Syncope, recurrent    COMPARISON:None    TECHNIQUE: CT of the brain without contrast.    FINDINGS: Ventricles and sulci are normal in size and shape. Gray and  white matter demonstrate normal differentiation.    There is no evidence of mass, mass effect or midline shift. No  evidence of acute hemorrhage.    The bones are unremarkable. No  fracture.      Impression:  IMPRESSION:   No acute intracranial abnormality.  No acute fracture. Normal  examination.    ZOILA MARTINEZ MD      Testing Performed By     Lab - Abbreviation Name Director Address Valid Date Range    104 - [facility] RADIOLOGY RESULTS Unknown Unknown 02/16/05 1553 - Present            Encounter-Level Documents:    There are no encounter-level documents.     Order-Level Documents:    There are no order-level documents.

## 2018-12-15 LAB
C TRACH DNA SPEC QL PROBE+SIG AMP: NOT DETECTED
N GONORRHOEA DNA SPEC QL PROBE+SIG AMP: NOT DETECTED
SPECIMEN SOURCE: NORMAL

## 2018-12-15 NOTE — ED TRIAGE NOTES
"Pt arrives to ED with c/o weakness, dizziness, and general malaise. Pt states this began this AM, pt states \"I just don't feel right, I'm shaky and would like to have labs done to see if I have any diseases\". Pt states he has been getting lightheaded, has been \"sluggish\".  Kallie Lynn  BP (!) 155/98   Pulse 88   Temp 98.2  F (36.8  C) (Tympanic)   Resp 16   Ht 1.727 m (5' 8\")   Wt 115.7 kg (255 lb)   SpO2 99%   BMI 38.77 kg/m      "

## 2018-12-15 NOTE — ED NOTES
[employer] called per patient request. They are backed up, will be here about 30 minutes. Patient updated.

## 2018-12-15 NOTE — RESULT ENCOUNTER NOTE
Final result for both N. Gonorrhoeae PCR and Chlamydia Trachomatis PCR are NEGATIVE.  No treatment or change in treatment per [place] ED Lab Result protocol.

## 2018-12-26 ENCOUNTER — OFFICE VISIT (OUTPATIENT)
Dept: FAMILY MEDICINE | Facility: OTHER | Age: 43
End: 2018-12-26
Attending: FAMILY MEDICINE
Payer: MEDICARE

## 2018-12-26 VITALS
SYSTOLIC BLOOD PRESSURE: 110 MMHG | HEART RATE: 80 BPM | DIASTOLIC BLOOD PRESSURE: 70 MMHG | WEIGHT: 264 LBS | TEMPERATURE: 97.4 F | RESPIRATION RATE: 18 BRPM | BODY MASS INDEX: 40.14 KG/M2

## 2018-12-26 DIAGNOSIS — F25.0 SCHIZOAFFECTIVE DISORDER, BIPOLAR TYPE (H): ICD-10-CM

## 2018-12-26 DIAGNOSIS — E55.9 VITAMIN D DEFICIENCY: Primary | ICD-10-CM

## 2018-12-26 DIAGNOSIS — K59.01 SLOW TRANSIT CONSTIPATION: ICD-10-CM

## 2018-12-26 PROBLEM — B34.9 VIRAL SYNDROME: Status: RESOLVED | Noted: 2018-09-14 | Resolved: 2018-12-26

## 2018-12-26 PROBLEM — E53.8 VITAMIN B12 DEFICIENCY (NON ANEMIC): Status: RESOLVED | Noted: 2018-02-22 | Resolved: 2018-12-26

## 2018-12-26 PROCEDURE — 99213 OFFICE O/P EST LOW 20 MIN: CPT | Performed by: FAMILY MEDICINE

## 2018-12-26 PROCEDURE — G0463 HOSPITAL OUTPT CLINIC VISIT: HCPCS

## 2018-12-26 RX ORDER — AMOXICILLIN 250 MG
1 CAPSULE ORAL 2 TIMES DAILY
Qty: 120 TABLET | Refills: 5 | Status: SHIPPED | OUTPATIENT
Start: 2018-12-26 | End: 2019-05-16

## 2018-12-26 ASSESSMENT — PAIN SCALES - GENERAL: PAINLEVEL: NO PAIN (0)

## 2018-12-26 ASSESSMENT — PATIENT HEALTH QUESTIONNAIRE - PHQ9: SUM OF ALL RESPONSES TO PHQ QUESTIONS 1-9: 0

## 2018-12-26 NOTE — NURSING NOTE
"Patient presents in the clinic for an ED follow up he was seen on 12/14 for lightheadedness.  Zoraida Albert LPN 12/26/2018 2:37 PM  Chief Complaint   Patient presents with     ER F/U       Initial /70 (BP Location: Right arm, Patient Position: Sitting, Cuff Size: Adult Large)   Pulse 80   Temp 97.4  F (36.3  C) (Tympanic)   Resp 18   Wt 119.7 kg (264 lb)   BMI 40.14 kg/m   Estimated body mass index is 40.14 kg/m  as calculated from the following:    Height as of 12/14/18: 1.727 m (5' 8\").    Weight as of this encounter: 119.7 kg (264 lb).  Medication Reconciliation: complete    Liat Albert LPN    "

## 2018-12-27 NOTE — PROGRESS NOTES
SUBJECTIVE:   Tenzin Ca is a 43 year old male who presents to clinic today for the following health issues: ER follow-up to [facility]    Patient arrives here for ER follow-up.  He had an episode of near syncope.  He is doing much better since his visit and has had no further episodes.This visit patient is requesting a  Refill of his vitamin D.  Chart indicates he is taking 5000 units but he reports only 1000 and Senokot.          Patient Active Problem List    Diagnosis Date Noted     Vitamin D deficiency 12/26/2018     Priority: Medium     Cough 03/29/2018     Priority: Medium     Slow transit constipation 02/22/2018     Priority: Medium     Hypertriglyceridemia 02/20/2018     Priority: Medium     Major depressive disorder, recurrent episode (H) 02/20/2018     Priority: Medium     Plica of knee 02/20/2018     Priority: Medium     Psoriasis 02/20/2018     Priority: Medium     Schizophrenia (H) 02/20/2018     Priority: Medium     Overview:   hears voices       Torn medial meniscus 02/20/2018     Priority: Medium     Obesity 02/21/2017     Priority: Medium     S/P left knee arthroscopy 07/05/2016     Priority: Medium     Chronic pain of left knee 04/07/2016     Priority: Medium     Psychosis (H) 07/02/2015     Priority: Medium     Erectile dysfunction 04/23/2014     Priority: Medium     Anal fissure 06/03/2013     Priority: Medium     Delayed ejaculation 06/03/2013     Priority: Medium     Sprain of lumbar region 06/21/2012     Priority: Medium     Past Medical History:   Diagnosis Date     Mental disorder     Disability related to mental illness     Personal history of other mental and behavioral disorders     2016      Past Surgical History:   Procedure Laterality Date     ARTHROSCOPY KNEE      Left Knee Arthroscopy and Partial Meniscectomy and Chondroplasty     Social History     Social History Narrative    Disabled secondary to schizophrenia. Lives independently in his own apartment.     Current  Outpatient Medications   Medication Sig Dispense Refill     ARIPiprazole (ABILIFY) 20 MG tablet Take 20 mg by mouth every morning  5     CLONAZEPAM PO Take 2 mg by mouth 2 times daily       hydrOXYzine 100 MG CAPS Take 1 capsule by mouth 2 times daily       OXcarbazepine (TRILEPTAL) 300 MG tablet Take 300 mg by mouth every morning       OXcarbazepine (TRILEPTAL) 600 MG tablet Take 600 mg by mouth At Bedtime       senna-docusate (SENOKOT-S/PERICOLACE) 8.6-50 MG tablet Take 1 tablet by mouth 2 times daily 120 tablet 5     vitamin D3 (CHOLECALCIFEROL) 1000 units (25 mcg) tablet Take 1 tablet (1,000 Units) by mouth daily 90 tablet 3     zolpidem (AMBIEN) 10 MG tablet Take 10 mg by mouth At Bedtime       No Known Allergies    Review of Systems     OBJECTIVE:     /70 (BP Location: Right arm, Patient Position: Sitting, Cuff Size: Adult Large)   Pulse 80   Temp 97.4  F (36.3  C) (Tympanic)   Resp 18   Wt 119.7 kg (264 lb)   BMI 40.14 kg/m    Body mass index is 40.14 kg/m .  Physical Exam   Constitutional: He appears well-developed.   HENT:   Head: Normocephalic.   Pulmonary/Chest: Effort normal and breath sounds normal.   Musculoskeletal: Normal range of motion.   Neurological: He is alert.   Skin: Skin is warm.       none     ASSESSMENT/PLAN:         1. Schizoaffective disorder, bipolar type (H)      2. Vitamin D deficiency    - vitamin D3 (CHOLECALCIFEROL) 1000 units (25 mcg) tablet; Take 1 tablet (1,000 Units) by mouth daily  Dispense: 90 tablet; Refill: 3    3. Slow transit constipation    - senna-docusate (SENOKOT-S/PERICOLACE) 8.6-50 MG tablet; Take 1 tablet by mouth 2 times daily  Dispense: 120 tablet; Refill: 5      Ervin Hassan MD  [facility]

## 2019-04-19 ENCOUNTER — OFFICE VISIT (OUTPATIENT)
Dept: FAMILY MEDICINE | Facility: OTHER | Age: 44
End: 2019-04-19
Attending: NURSE PRACTITIONER
Payer: MEDICARE

## 2019-04-19 ENCOUNTER — TRANSFERRED RECORDS (OUTPATIENT)
Dept: HEALTH INFORMATION MANAGEMENT | Facility: OTHER | Age: 44
End: 2019-04-19

## 2019-04-19 VITALS
DIASTOLIC BLOOD PRESSURE: 80 MMHG | BODY MASS INDEX: 38.8 KG/M2 | HEIGHT: 68 IN | SYSTOLIC BLOOD PRESSURE: 124 MMHG | HEART RATE: 78 BPM | WEIGHT: 256 LBS | TEMPERATURE: 97.9 F | RESPIRATION RATE: 22 BRPM

## 2019-04-19 DIAGNOSIS — K08.89 TOOTH PAIN: Primary | ICD-10-CM

## 2019-04-19 PROCEDURE — 99214 OFFICE O/P EST MOD 30 MIN: CPT | Performed by: PHYSICIAN ASSISTANT

## 2019-04-19 PROCEDURE — G0463 HOSPITAL OUTPT CLINIC VISIT: HCPCS

## 2019-04-19 RX ORDER — AMOXICILLIN 875 MG
875 TABLET ORAL 2 TIMES DAILY
Qty: 14 TABLET | Refills: 0 | Status: SHIPPED | OUTPATIENT
Start: 2019-04-19 | End: 2019-05-16

## 2019-04-19 ASSESSMENT — PAIN SCALES - GENERAL: PAINLEVEL: EXTREME PAIN (8)

## 2019-04-19 ASSESSMENT — MIFFLIN-ST. JEOR: SCORE: 2035.71

## 2019-04-19 NOTE — NURSING NOTE
"Chief Complaint   Patient presents with     Dental Pain     Patient has had left side facial pain for 4 days. Says ahs not been able to eat or sleep well.   Initial /80 (BP Location: Right arm, Patient Position: Sitting, Cuff Size: Adult Large)   Pulse 78   Temp 97.9  F (36.6  C) (Tympanic)   Resp 22   Ht 1.727 m (5' 8\")   Wt 116.1 kg (256 lb)   BMI 38.92 kg/m   Estimated body mass index is 38.92 kg/m  as calculated from the following:    Height as of this encounter: 1.727 m (5' 8\").    Weight as of this encounter: 116.1 kg (256 lb).  Medication Reconciliation: complete    Barbara Roque LPN  "

## 2019-04-19 NOTE — PATIENT INSTRUCTIONS
"Tooth pain.  Start amoxicillin 875 mg oral tablet, take twice daily for 10 days  Ibuprofen 600-800 mg every 6-8 hours, max 2400 mg/day. Take with food.  Tylenol 650-1000 mg every 4-6 hours, max 4000 mg/day  Apply ice to jaw 10-20 minutes every 1-2 hours as needed. Or alternately you can try heat  Follow up with Dr. Zhang  Follow up with PCP as needed  Seek immediate care for    Your face becomes swollen or red    Pain worsens or spreads to the neck    Fever over 101  F (38.3  C)    Unusual drowsiness; headache or stiff neck; weakness or fainting    Pus drains from the tooth    Difficulty swallowing or breathing    Patient Education     Dental Pain    A crack or cavity in a tooth can cause tooth pain. This is because the crack or cavity exposes the sensitive inner area of the tooth. An infection in the gum or the root of the tooth can cause pain and swelling. The pain is often made worse when you drink hot or cold beverages. It can also be worse when you bite on hard foods. Pain may spread from the tooth to your ear or the area of the jaw on the same side.  Home care  Follow these tips when caring for yourself at home:    Don't have hot and cold foods and drinks. Your tooth may be sensitive to changes in temperature.    Use toothpaste made for sensitive teeth. Brush gently up and down instead of sideways. Brushing sideways can wear away root surfaces if they are exposed.    If your tooth is chipped or cracked, or if there is a large open cavity, put oil of cloves directly on the tooth to relieve pain. You can buy oil of cloves at drugstores. Some pharmacies carry an over-the-counter \"toothache kit.\" This contains a paste that you can put on the exposed tooth to make it less sensitive.    Put a cold pack on your jaw over the sore area to help reduce pain.    You may use over-the-counter medicine to ease pain, unless your doctor prescribed another medicine. If you have chronic liver or kidney disease, talk with your " healthcare provider before using acetaminophen or ibuprofen. Also talk with your provider if you ve had a stomach ulcer or GI bleeding.    If you have signs of an infection, you will be given an antibiotic. Take it as directed.  Follow-up care  Follow up with your dentist, or as advised. Your pain may go away with the treatment given today. But only a dentist can fully look at and treat the cause of your pain. This will keep the pain from coming back.  Call 911  Call 911 if any of these occur:    Unusual drowsiness    Headache or stiff neck    Weakness or fainting    Difficulty swallowing or breathing  When to seek medical advice  Call your health care provider right away if any of these occur:    Your face becomes swollen or red    Pain gets worse or spreads to your neck    Fever of 100.4  F (38.0  C) or higher, or as directed by your healthcare provider    Pus drains from the tooth  Date Last Reviewed: 10/1/2016    4574-0385 [employer]. [address] [place] [address], [place] [address]. All rights reserved. This information is not intended as a substitute for professional medical care. Always follow your healthcare professional's instructions.

## 2019-04-19 NOTE — PROGRESS NOTES
"SUBJECTIVE: 43 year old male with tooth pain and swelling. He states he chipped his tooth about 4 days ago.   Onset 4 days ago course is worsening  Associated symptoms: no fever, feels well  Severity: 8/10 constant pain. Sharp pain    Treatments - nothing    Past Medical History:   Diagnosis Date     Mental disorder     Disability related to mental illness     Personal history of other mental and behavioral disorders     2016     Current Outpatient Medications   Medication     ARIPiprazole (ABILIFY) 20 MG tablet     CLONAZEPAM PO     hydrOXYzine 100 MG CAPS     OXcarbazepine (TRILEPTAL) 300 MG tablet     OXcarbazepine (TRILEPTAL) 600 MG tablet     senna-docusate (SENOKOT-S/PERICOLACE) 8.6-50 MG tablet     vitamin D3 (CHOLECALCIFEROL) 1000 units (25 mcg) tablet     zolpidem (AMBIEN) 10 MG tablet     No current facility-administered medications for this visit.       No Known Allergies      ROS  General: feels well, has tooth pain  HENT: POSITIVE per HPI  Respiratory: negative  Abdomen: negative  Skin: negative    OBJECTIVE:   Vitals:    04/19/19 1340   BP: 124/80   BP Location: Right arm   Patient Position: Sitting   Cuff Size: Adult Large   Pulse: 78   Resp: 22   Temp: 97.9  F (36.6  C)   TempSrc: Tympanic   Weight: 116.1 kg (256 lb)   Height: 1.727 m (5' 8\")       Vitals as noted above.  Appears healthy, alert and NAD.  Ears: normal  Oropharynx: normal  Tooth - 30 & 30 lower right have fillings, no noted chipped tooth. Gum is erythematous and swollen in this area.   Neck: normal, supple and no adenopathy  Cardiac: normal RR, no murmur  Lungs: normal respiration, clear to ausculation  Psychological: normal affect, alert and pleasant      ASSESSMENT:   (K08.89) Tooth pain  (primary encounter diagnosis)  Plan: amoxicillin (AMOXIL) 875 MG tablet      Tooth pain.  Start amoxicillin 875 mg oral tablet, take twice daily for 10 days  Ibuprofen 600-800 mg every 6-8 hours, max 2400 mg/day. Take with food.  Tylenol 650-1000 mg " every 4-6 hours, max 4000 mg/day  Apply ice to jaw 10-20 minutes every 1-2 hours as needed. Or alternately you can try heat  Follow up with Dr. Zhang  Follow up with PCP as needed  Patient received verbal and written instruction including review of warning signs    Lizzette Durham PA-C on 4/19/2019 at 3:08 PM

## 2019-05-15 ENCOUNTER — MEDICAL CORRESPONDENCE (OUTPATIENT)
Dept: HEALTH INFORMATION MANAGEMENT | Facility: OTHER | Age: 44
End: 2019-05-15

## 2019-05-15 ENCOUNTER — ALLIED HEALTH/NURSE VISIT (OUTPATIENT)
Dept: FAMILY MEDICINE | Facility: OTHER | Age: 44
End: 2019-05-15
Attending: FAMILY MEDICINE
Payer: MEDICARE

## 2019-05-15 DIAGNOSIS — F20.0 PARANOID SCHIZOPHRENIA, SUBCHRONIC CONDITION (H): Primary | ICD-10-CM

## 2019-05-15 DIAGNOSIS — F25.0 SCHIZOAFFECTIVE DISORDER, BIPOLAR TYPE (H): Primary | ICD-10-CM

## 2019-05-15 LAB
ALBUMIN SERPL-MCNC: 4 G/DL (ref 3.5–5.7)
ALP SERPL-CCNC: 73 U/L (ref 34–104)
ALT SERPL W P-5'-P-CCNC: 32 U/L (ref 7–52)
ANION GAP SERPL CALCULATED.3IONS-SCNC: 5 MMOL/L (ref 3–14)
AST SERPL W P-5'-P-CCNC: 22 U/L (ref 13–39)
BASOPHILS # BLD AUTO: 0 10E9/L (ref 0–0.2)
BASOPHILS NFR BLD AUTO: 0.6 %
BILIRUB SERPL-MCNC: 0.3 MG/DL (ref 0.3–1)
BUN SERPL-MCNC: 10 MG/DL (ref 7–25)
CALCIUM SERPL-MCNC: 9.2 MG/DL (ref 8.6–10.3)
CHLORIDE SERPL-SCNC: 105 MMOL/L (ref 98–107)
CHOLEST SERPL-MCNC: 181 MG/DL
CO2 SERPL-SCNC: 29 MMOL/L (ref 21–31)
CREAT SERPL-MCNC: 1.05 MG/DL (ref 0.7–1.3)
DEPRECATED CALCIDIOL+CALCIFEROL SERPL-MC: 23.7 NG/ML
DIFFERENTIAL METHOD BLD: NORMAL
EOSINOPHIL # BLD AUTO: 0.2 10E9/L (ref 0–0.7)
EOSINOPHIL NFR BLD AUTO: 3.6 %
ERYTHROCYTE [DISTWIDTH] IN BLOOD BY AUTOMATED COUNT: 11.9 % (ref 10–15)
GFR SERPL CREATININE-BSD FRML MDRD: 77 ML/MIN/{1.73_M2}
GLUCOSE SERPL-MCNC: 119 MG/DL (ref 70–105)
HBA1C MFR BLD: 5.7 % (ref 4–6)
HCT VFR BLD AUTO: 41.8 % (ref 40–53)
HDLC SERPL-MCNC: 30 MG/DL (ref 23–92)
HGB BLD-MCNC: 14.5 G/DL (ref 13.3–17.7)
IMM GRANULOCYTES # BLD: 0.1 10E9/L (ref 0–0.4)
IMM GRANULOCYTES NFR BLD: 1.7 %
LDLC SERPL CALC-MCNC: 79 MG/DL
LYMPHOCYTES # BLD AUTO: 1.5 10E9/L (ref 0.8–5.3)
LYMPHOCYTES NFR BLD AUTO: 29.2 %
MCH RBC QN AUTO: 29.1 PG (ref 26.5–33)
MCHC RBC AUTO-ENTMCNC: 34.7 G/DL (ref 31.5–36.5)
MCV RBC AUTO: 84 FL (ref 78–100)
MONOCYTES # BLD AUTO: 0.4 10E9/L (ref 0–1.3)
MONOCYTES NFR BLD AUTO: 8.2 %
NEUTROPHILS # BLD AUTO: 3 10E9/L (ref 1.6–8.3)
NEUTROPHILS NFR BLD AUTO: 56.7 %
NONHDLC SERPL-MCNC: 151 MG/DL
PLATELET # BLD AUTO: 195 10E9/L (ref 150–450)
POTASSIUM SERPL-SCNC: 4 MMOL/L (ref 3.5–5.1)
PROLACTIN SERPL-MCNC: 4.04 NG/ML
PROT SERPL-MCNC: 6.8 G/DL (ref 6.4–8.9)
RBC # BLD AUTO: 4.98 10E12/L (ref 4.4–5.9)
SODIUM SERPL-SCNC: 139 MMOL/L (ref 134–144)
T3FREE SERPL-MCNC: 3.7 PG/ML (ref 2.5–3.9)
T4 FREE SERPL-MCNC: 0.55 NG/DL (ref 0.6–1.6)
TRIGL SERPL-MCNC: 360 MG/DL
TSH SERPL DL<=0.05 MIU/L-ACNC: 3.98 IU/ML (ref 0.34–5.6)
VIT B12 SERPL-MCNC: 296 PG/ML (ref 180–914)
WBC # BLD AUTO: 5.2 10E9/L (ref 4–11)

## 2019-05-15 PROCEDURE — G0463 HOSPITAL OUTPT CLINIC VISIT: HCPCS | Mod: 25

## 2019-05-15 PROCEDURE — 80061 LIPID PANEL: CPT

## 2019-05-15 PROCEDURE — 82607 VITAMIN B-12: CPT

## 2019-05-15 PROCEDURE — 85025 COMPLETE CBC W/AUTO DIFF WBC: CPT

## 2019-05-15 PROCEDURE — 84481 FREE ASSAY (FT-3): CPT

## 2019-05-15 PROCEDURE — 80053 COMPREHEN METABOLIC PANEL: CPT

## 2019-05-15 PROCEDURE — 93005 ELECTROCARDIOGRAM TRACING: CPT

## 2019-05-15 PROCEDURE — 84443 ASSAY THYROID STIM HORMONE: CPT

## 2019-05-15 PROCEDURE — 93010 ELECTROCARDIOGRAM REPORT: CPT | Performed by: INTERNAL MEDICINE

## 2019-05-15 PROCEDURE — 80183 DRUG SCRN QUANT OXCARBAZEPIN: CPT

## 2019-05-15 PROCEDURE — 84146 ASSAY OF PROLACTIN: CPT

## 2019-05-15 PROCEDURE — 84439 ASSAY OF FREE THYROXINE: CPT

## 2019-05-15 PROCEDURE — 36415 COLL VENOUS BLD VENIPUNCTURE: CPT

## 2019-05-15 PROCEDURE — 83036 HEMOGLOBIN GLYCOSYLATED A1C: CPT | Mod: GZ

## 2019-05-15 PROCEDURE — 82306 VITAMIN D 25 HYDROXY: CPT

## 2019-05-15 NOTE — PROGRESS NOTES
"Patient's name and  verified. Patient is here for an EKG today. EKG completed without complication. Patient reports that he \"sometimes feels dizzy and has arm pain that has been off and on.\" Patient reports that he will see PCP tomorrow for his arm pain.  Patient's B/P is 110/76 and pulse was 62. Patient left ambulatory.    Mariposa Curry RN on 5/15/2019 at 1:41 PM    Fax sent to [facility] at [phone]. Confirmed at 1:54 pm.    Mariposa Curry RN on 5/15/2019 at 1:54 PM    "

## 2019-05-16 ENCOUNTER — OFFICE VISIT (OUTPATIENT)
Dept: FAMILY MEDICINE | Facility: OTHER | Age: 44
End: 2019-05-16
Attending: FAMILY MEDICINE
Payer: MEDICARE

## 2019-05-16 VITALS
HEIGHT: 68 IN | DIASTOLIC BLOOD PRESSURE: 68 MMHG | HEART RATE: 68 BPM | BODY MASS INDEX: 39.56 KG/M2 | WEIGHT: 261 LBS | TEMPERATURE: 97.6 F | SYSTOLIC BLOOD PRESSURE: 100 MMHG | RESPIRATION RATE: 16 BRPM

## 2019-05-16 DIAGNOSIS — M25.512 ACUTE PAIN OF LEFT SHOULDER: Primary | ICD-10-CM

## 2019-05-16 DIAGNOSIS — K59.01 SLOW TRANSIT CONSTIPATION: ICD-10-CM

## 2019-05-16 PROCEDURE — 99213 OFFICE O/P EST LOW 20 MIN: CPT | Performed by: FAMILY MEDICINE

## 2019-05-16 PROCEDURE — G0463 HOSPITAL OUTPT CLINIC VISIT: HCPCS

## 2019-05-16 RX ORDER — PRAZOSIN HYDROCHLORIDE 1 MG/1
1 CAPSULE ORAL AT BEDTIME
Refills: 0 | COMMUNITY
Start: 2019-05-08 | End: 2020-02-26

## 2019-05-16 RX ORDER — AMOXICILLIN 250 MG
1 CAPSULE ORAL 2 TIMES DAILY
Qty: 120 TABLET | Refills: 5 | Status: SHIPPED | OUTPATIENT
Start: 2019-05-16 | End: 2020-02-26

## 2019-05-16 RX ORDER — MELOXICAM 7.5 MG/1
7.5 TABLET ORAL DAILY PRN
Qty: 30 TABLET | Refills: 3 | Status: SHIPPED | OUTPATIENT
Start: 2019-05-16 | End: 2020-02-26

## 2019-05-16 RX ORDER — BREXPIPRAZOLE 2 MG/1
1 TABLET ORAL DAILY
Refills: 0 | COMMUNITY
Start: 2019-05-08 | End: 2020-02-26

## 2019-05-16 ASSESSMENT — PATIENT HEALTH QUESTIONNAIRE - PHQ9: SUM OF ALL RESPONSES TO PHQ QUESTIONS 1-9: 0

## 2019-05-16 ASSESSMENT — PAIN SCALES - GENERAL: PAINLEVEL: MODERATE PAIN (5)

## 2019-05-16 ASSESSMENT — MIFFLIN-ST. JEOR: SCORE: 2058.39

## 2019-05-16 NOTE — NURSING NOTE
Patient here for left arm and shoulder pain for the past 3 weeks. It hurts mainly in the joint.Medication Reconciliation: complete.    Jaquelin Aguilera LPN  5/16/2019 12:32 PM

## 2019-05-17 LAB — 10OH-CARBAZEPINE SERPL-MCNC: 19.4 UG/ML (ref 10–35)

## 2019-05-17 NOTE — PROGRESS NOTES
SUBJECTIVE:   Tenzin Ca is a 43 year old male who presents to clinic today for the following health issues: Shoulder pain    Patient arrives here for shoulder pain.  Is been going on for 3 weeks.  He denies any injury.  Twisting behind his back or above his head produces quite a bit of discomfort.  He is also having problems sleeping on it.  He rates it as a pain of 10 out of 10.        Patient Active Problem List    Diagnosis Date Noted     Acute pain of left shoulder 05/16/2019     Priority: Medium     Vitamin D deficiency 12/26/2018     Priority: Medium     Cough 03/29/2018     Priority: Medium     Slow transit constipation 02/22/2018     Priority: Medium     Hypertriglyceridemia 02/20/2018     Priority: Medium     Major depressive disorder, recurrent episode (H) 02/20/2018     Priority: Medium     Plica of knee 02/20/2018     Priority: Medium     Psoriasis 02/20/2018     Priority: Medium     Schizophrenia (H) 02/20/2018     Priority: Medium     Overview:   hears voices       Torn medial meniscus 02/20/2018     Priority: Medium     Obesity 02/21/2017     Priority: Medium     S/P left knee arthroscopy 07/05/2016     Priority: Medium     Chronic pain of left knee 04/07/2016     Priority: Medium     Psychosis (H) 07/02/2015     Priority: Medium     Erectile dysfunction 04/23/2014     Priority: Medium     Anal fissure 06/03/2013     Priority: Medium     Delayed ejaculation 06/03/2013     Priority: Medium     Sprain of lumbar region 06/21/2012     Priority: Medium     Past Medical History:   Diagnosis Date     Mental disorder     Disability related to mental illness     Personal history of other mental and behavioral disorders     2016      Past Surgical History:   Procedure Laterality Date     ARTHROSCOPY KNEE      Left Knee Arthroscopy and Partial Meniscectomy and Chondroplasty       Review of Systems     OBJECTIVE:     /68 (BP Location: Right arm, Patient Position: Sitting)   Pulse 68   Temp 97.6  F  "(36.4  C)   Resp 16   Ht 1.727 m (5' 8\")   Wt 118.4 kg (261 lb)   BMI 39.68 kg/m    Body mass index is 39.68 kg/m .  Physical Exam   Constitutional: He appears well-developed and well-nourished.   HENT:   Head: Normocephalic.   Genitourinary: Penis normal.   Musculoskeletal: He exhibits tenderness.   Patient has decreased range of motion.  Pain with abduction.   Neurological: He is alert.   Skin: Skin is warm.       none     ASSESSMENT/PLAN:         1. Acute pain of left shoulder  Trial of Mobic.  If no improvement consider physical therapy- meloxicam (MOBIC) 7.5 MG tablet; Take 1 tablet (7.5 mg) by mouth daily as needed  Dispense: 30 tablet; Refill: 3    2. Slow transit constipation  Refill  - senna-docusate (SENOKOT-S/PERICOLACE) 8.6-50 MG tablet; Take 1 tablet by mouth 2 times daily  Dispense: 120 tablet; Refill: 5      Ervin Hassan MD  [facility]  "

## 2019-05-22 ENCOUNTER — OFFICE VISIT (OUTPATIENT)
Dept: FAMILY MEDICINE | Facility: OTHER | Age: 44
End: 2019-05-22
Attending: NURSE PRACTITIONER
Payer: MEDICARE

## 2019-05-22 ENCOUNTER — TELEPHONE (OUTPATIENT)
Dept: FAMILY MEDICINE | Facility: OTHER | Age: 44
End: 2019-05-22

## 2019-05-22 VITALS
RESPIRATION RATE: 16 BRPM | HEIGHT: 68 IN | BODY MASS INDEX: 39.34 KG/M2 | SYSTOLIC BLOOD PRESSURE: 102 MMHG | TEMPERATURE: 97.9 F | DIASTOLIC BLOOD PRESSURE: 70 MMHG | HEART RATE: 60 BPM | WEIGHT: 259.6 LBS

## 2019-05-22 DIAGNOSIS — Z11.3 ROUTINE SCREENING FOR STI (SEXUALLY TRANSMITTED INFECTION): Primary | ICD-10-CM

## 2019-05-22 PROCEDURE — 99213 OFFICE O/P EST LOW 20 MIN: CPT | Performed by: NURSE PRACTITIONER

## 2019-05-22 PROCEDURE — 87491 CHLMYD TRACH DNA AMP PROBE: CPT | Mod: ZL | Performed by: NURSE PRACTITIONER

## 2019-05-22 PROCEDURE — G0463 HOSPITAL OUTPT CLINIC VISIT: HCPCS

## 2019-05-22 PROCEDURE — 87591 N.GONORRHOEAE DNA AMP PROB: CPT | Mod: ZL | Performed by: NURSE PRACTITIONER

## 2019-05-22 ASSESSMENT — ANXIETY QUESTIONNAIRES
1. FEELING NERVOUS, ANXIOUS, OR ON EDGE: NOT AT ALL
IF YOU CHECKED OFF ANY PROBLEMS ON THIS QUESTIONNAIRE, HOW DIFFICULT HAVE THESE PROBLEMS MADE IT FOR YOU TO DO YOUR WORK, TAKE CARE OF THINGS AT HOME, OR GET ALONG WITH OTHER PEOPLE: NOT DIFFICULT AT ALL
5. BEING SO RESTLESS THAT IT IS HARD TO SIT STILL: NOT AT ALL
7. FEELING AFRAID AS IF SOMETHING AWFUL MIGHT HAPPEN: NOT AT ALL
4. TROUBLE RELAXING: NOT AT ALL
6. BECOMING EASILY ANNOYED OR IRRITABLE: NOT AT ALL
2. NOT BEING ABLE TO STOP OR CONTROL WORRYING: NOT AT ALL
GAD7 TOTAL SCORE: 0
3. WORRYING TOO MUCH ABOUT DIFFERENT THINGS: NOT AT ALL

## 2019-05-22 ASSESSMENT — PAIN SCALES - GENERAL: PAINLEVEL: NO PAIN (0)

## 2019-05-22 ASSESSMENT — PATIENT HEALTH QUESTIONNAIRE - PHQ9: SUM OF ALL RESPONSES TO PHQ QUESTIONS 1-9: 0

## 2019-05-22 ASSESSMENT — MIFFLIN-ST. JEOR: SCORE: 2052.04

## 2019-05-22 NOTE — TELEPHONE ENCOUNTER
Patient has concerns about getting tested for any diseases that he may get from his wife's employer. The employer wants him checked, His wife has BB disease. He is not sure what that is or if he can contract the disease.     Sosa Swanson on 5/22/2019 at 11:17 AM

## 2019-05-22 NOTE — NURSING NOTE
Patient presents to the clinic for an STD check.    Medication Reconciliation Completed.    Buzz Peters LPN  5/22/2019 2:14 PM

## 2019-05-22 NOTE — TELEPHONE ENCOUNTER
Patient is unclear about what disease he may have but says his fiance's staff at the group home would like him checked out. He has an appointment in clinic this afternoon.

## 2019-05-22 NOTE — PROGRESS NOTES
Subjective     Tenzin Ca is a 43 year old male who presents to clinic today for the following health issues:    HPI     STI Screening  Has been with current partner for 6-7 years. He has had no previous partners.   Denies any dysuria, hematuria, penile discharge, genital lesions/rashes.  His significant other was recently diagnosed with bacterial vaginosis and staff at her group home whom he says are not medical oriented have told his partner that he gave her the bacterial vaginosis. He is very upset about this.   Patient's partner wanted him to come in for STI screening  He reports that he has had HIV/aids, hepatitis testing in the past and is not concerned about this.       Patient Active Problem List   Diagnosis     Psychosis (H)     Anal fissure     Chronic pain of left knee     Delayed ejaculation     Erectile dysfunction     Hypertriglyceridemia     Sprain of lumbar region     Major depressive disorder, recurrent episode (H)     Obesity     Plica of knee     Psoriasis     Schizophrenia (H)     Torn medial meniscus     S/P left knee arthroscopy     Slow transit constipation     Cough     Vitamin D deficiency     Acute pain of left shoulder     Past Surgical History:   Procedure Laterality Date     ARTHROSCOPY KNEE      Left Knee Arthroscopy and Partial Meniscectomy and Chondroplasty       Social History     Tobacco Use     Smoking status: Never Smoker     Smokeless tobacco: Never Used   Substance Use Topics     Alcohol use: No     Alcohol/week: 0.0 oz     Family History   Problem Relation Age of Onset     Substance Abuse Mother         Alcohol/Drug     Alcoholism Mother         Alcoholism     Substance Abuse Brother         Alcohol/Drug     Alcoholism Brother         Alcoholism     Substance Abuse Sister         Alcohol/Drug     Alcoholism Sister         Alcoholism         Current Outpatient Medications   Medication Sig Dispense Refill     ARIPiprazole (ABILIFY) 20 MG tablet Take 20 mg by mouth every  "morning  5     CLONAZEPAM PO Take 2 mg by mouth 2 times daily       hydrOXYzine 100 MG CAPS Take 1 capsule by mouth 2 times daily       meloxicam (MOBIC) 7.5 MG tablet Take 1 tablet (7.5 mg) by mouth daily as needed 30 tablet 3     OXcarbazepine (TRILEPTAL) 300 MG tablet Take 300 mg by mouth every morning       OXcarbazepine (TRILEPTAL) 600 MG tablet Take 600 mg by mouth At Bedtime       prazosin (MINIPRESS) 1 MG capsule Take 1 capsule by mouth At Bedtime  0     REXULTI 2 MG tablet Take 1 tablet by mouth daily  0     senna-docusate (SENOKOT-S/PERICOLACE) 8.6-50 MG tablet Take 1 tablet by mouth 2 times daily 120 tablet 5     zolpidem (AMBIEN) 10 MG tablet Take 10 mg by mouth At Bedtime       No Known Allergies  Recent Labs   Lab Test 05/15/19  0839 12/14/18  2210 05/03/18  1125 11/02/17  1246  07/03/15  0631   A1C 5.7  --  5.5  --   --   --    LDL 79  --  111* 103*   < >  --    HDL 30  --  25 29   < >  --    TRIG 360*  --  217* 255*   < >  --    ALT 32 30 27  --    < >  --    CR 1.05 1.01 1.12  --    < >  --    GFRESTIMATED 77 81 72  --    < >  --    GFRESTBLACK >90 >90 87  --    < >  --    POTASSIUM 4.0 3.5 4.4  --    < >  --    TSH  --   --   --   --   --  2.94    < > = values in this interval not displayed.        Reviewed and updated as needed this visit by Provider         Review of Systems   ROS COMP: Constitutional, HEENT, cardiovascular, pulmonary, gi and gu systems are negative, except as otherwise noted.      Objective      Vitals: /70 (BP Location: Right arm, Patient Position: Sitting, Cuff Size: Adult Large)   Pulse 60   Temp 97.9  F (36.6  C)   Resp 16   Ht 1.727 m (5' 8\")   Wt 117.8 kg (259 lb 9.6 oz)   BMI 39.47 kg/m    BMI= Body mass index is 39.47 kg/m .    Physical Exam     GENERAL: healthy, alert and no distress  PSYCH: mentation appears normal, affect normal    Diagnostic Test Results:  Labs reviewed in Epic  Results for orders placed or performed in visit on 05/22/19   GC/Chlamydia by " PCR   Result Value Ref Range    Specimen Source Endocervical     Neisseria gonorrhoreae PCR Not Detected NDET^Not Detected    Chlamydia Trachomatis PCR Not Detected NDET^Not Detected           Assessment & Plan     1. Routine screening for STI (sexually transmitted infection)  Routine. Please refer to hand out on bacterial vaginosis. This is not a STI that you are giving to significant other.  - GC/Chlamydia by PCR       FUTURE APPOINTMENTS:       - Follow-up visit: Routinely with PCP and as needed for acute concerns.    No follow-ups on file.    Radha Gracia CNP  [facility]

## 2019-05-22 NOTE — PATIENT INSTRUCTIONS
Assessment & Plan     1. Routine screening for STI (sexually transmitted infection)  Routine. Please refer to hand out on bacterial vaginosis. This is not a STI that you are giving to significant other.  - GC/Chlamydia by PCR       FUTURE APPOINTMENTS:       - Follow-up visit: Routinely with PCP and as needed for acute concerns.    No follow-ups on file.    Radha Gracia, CNP  [facility] AND \[facility]\""

## 2019-05-23 ASSESSMENT — ANXIETY QUESTIONNAIRES: GAD7 TOTAL SCORE: 0

## 2019-07-11 ENCOUNTER — OFFICE VISIT (OUTPATIENT)
Dept: FAMILY MEDICINE | Facility: OTHER | Age: 44
End: 2019-07-11
Attending: FAMILY MEDICINE
Payer: MEDICARE

## 2019-07-11 VITALS
HEART RATE: 76 BPM | WEIGHT: 265.6 LBS | DIASTOLIC BLOOD PRESSURE: 80 MMHG | HEIGHT: 68 IN | RESPIRATION RATE: 16 BRPM | TEMPERATURE: 98.6 F | SYSTOLIC BLOOD PRESSURE: 102 MMHG | BODY MASS INDEX: 40.25 KG/M2

## 2019-07-11 DIAGNOSIS — N76.0 BV (BACTERIAL VAGINOSIS): Primary | ICD-10-CM

## 2019-07-11 DIAGNOSIS — B96.89 BV (BACTERIAL VAGINOSIS): Primary | ICD-10-CM

## 2019-07-11 PROBLEM — M25.512 ACUTE PAIN OF LEFT SHOULDER: Status: RESOLVED | Noted: 2019-05-16 | Resolved: 2019-07-11

## 2019-07-11 PROBLEM — R05.9 COUGH: Status: RESOLVED | Noted: 2018-03-29 | Resolved: 2019-07-11

## 2019-07-11 PROCEDURE — 99213 OFFICE O/P EST LOW 20 MIN: CPT | Performed by: FAMILY MEDICINE

## 2019-07-11 PROCEDURE — G0463 HOSPITAL OUTPT CLINIC VISIT: HCPCS

## 2019-07-11 RX ORDER — METRONIDAZOLE 500 MG/1
500 TABLET ORAL 3 TIMES DAILY
Qty: 21 TABLET | Refills: 0 | Status: SHIPPED | OUTPATIENT
Start: 2019-07-11 | End: 2019-08-27

## 2019-07-11 RX ORDER — ARIPIPRAZOLE 2 MG/1
TABLET ORAL
Refills: 2 | COMMUNITY
Start: 2019-06-13 | End: 2020-02-26

## 2019-07-11 RX ORDER — CLONAZEPAM 0.5 MG/1
TABLET ORAL
Refills: 2 | COMMUNITY
Start: 2019-07-01 | End: 2021-11-22

## 2019-07-11 RX ORDER — HYDROXYZINE HYDROCHLORIDE 50 MG/1
TABLET, FILM COATED ORAL
Refills: 1 | COMMUNITY
Start: 2019-05-29 | End: 2021-09-14

## 2019-07-11 ASSESSMENT — ANXIETY QUESTIONNAIRES
3. WORRYING TOO MUCH ABOUT DIFFERENT THINGS: NOT AT ALL
6. BECOMING EASILY ANNOYED OR IRRITABLE: NOT AT ALL
2. NOT BEING ABLE TO STOP OR CONTROL WORRYING: NOT AT ALL
4. TROUBLE RELAXING: NOT AT ALL
7. FEELING AFRAID AS IF SOMETHING AWFUL MIGHT HAPPEN: NOT AT ALL
GAD7 TOTAL SCORE: 0
IF YOU CHECKED OFF ANY PROBLEMS ON THIS QUESTIONNAIRE, HOW DIFFICULT HAVE THESE PROBLEMS MADE IT FOR YOU TO DO YOUR WORK, TAKE CARE OF THINGS AT HOME, OR GET ALONG WITH OTHER PEOPLE: NOT DIFFICULT AT ALL
1. FEELING NERVOUS, ANXIOUS, OR ON EDGE: NOT AT ALL
5. BEING SO RESTLESS THAT IT IS HARD TO SIT STILL: NOT AT ALL

## 2019-07-11 ASSESSMENT — MIFFLIN-ST. JEOR: SCORE: 2079.25

## 2019-07-11 ASSESSMENT — PATIENT HEALTH QUESTIONNAIRE - PHQ9: SUM OF ALL RESPONSES TO PHQ QUESTIONS 1-9: 0

## 2019-07-11 ASSESSMENT — PAIN SCALES - GENERAL: PAINLEVEL: NO PAIN (0)

## 2019-07-11 NOTE — NURSING NOTE
Patient presents to the clinic for STD testing. Patient states every time him and his girlfriend have sex, his girlfriend gets bacterial vaginosis. Patient would like testing and answers to why his girlfriend gets BV every time.    Medication Reconciliation Completed.    Buzz Peters LPN  7/11/2019 1:26 PM

## 2019-07-12 ASSESSMENT — ANXIETY QUESTIONNAIRES: GAD7 TOTAL SCORE: 0

## 2019-07-12 NOTE — PROGRESS NOTES
"  SUBJECTIVE:   Tenzin Ca is a 43 year old male who presents to clinic today for the following health issues: STD testing    Patient arrives here for STD testing.  He states that her partner is constantly getting BV.  And was wondering if he is transferring her to her.  Otherwise they report that she is recently been tested for all STDs and have all been negative except for BV.        Patient Active Problem List    Diagnosis Date Noted     BV (bacterial vaginosis) 07/11/2019     Priority: Medium     Vitamin D deficiency 12/26/2018     Priority: Medium     Slow transit constipation 02/22/2018     Priority: Medium     Hypertriglyceridemia 02/20/2018     Priority: Medium     Major depressive disorder, recurrent episode (H) 02/20/2018     Priority: Medium     Plica of knee 02/20/2018     Priority: Medium     Psoriasis 02/20/2018     Priority: Medium     Schizophrenia (H) 02/20/2018     Priority: Medium     Overview:   hears voices       Torn medial meniscus 02/20/2018     Priority: Medium     Obesity 02/21/2017     Priority: Medium     S/P left knee arthroscopy 07/05/2016     Priority: Medium     Chronic pain of left knee 04/07/2016     Priority: Medium     Psychosis (H) 07/02/2015     Priority: Medium     Erectile dysfunction 04/23/2014     Priority: Medium     Delayed ejaculation 06/03/2013     Priority: Medium     Sprain of lumbar region 06/21/2012     Priority: Medium     Past Medical History:   Diagnosis Date     Mental disorder     Disability related to mental illness     Personal history of other mental and behavioral disorders     2016      Past Surgical History:   Procedure Laterality Date     ARTHROSCOPY KNEE      Left Knee Arthroscopy and Partial Meniscectomy and Chondroplasty       Review of Systems     OBJECTIVE:     /80 (BP Location: Right arm, Patient Position: Sitting, Cuff Size: Adult Large)   Pulse 76   Temp 98.6  F (37  C)   Resp 16   Ht 1.727 m (5' 8\")   Wt 120.5 kg (265 lb 9.6 oz)   " BMI 40.38 kg/m    Body mass index is 40.38 kg/m .  Physical Exam   Constitutional: He appears well-developed.   HENT:   Head: Normocephalic.   Pulmonary/Chest: Effort normal.   Neurological: He is alert.   Skin: Skin is warm.       Recurrent infection    ASSESSMENT/PLAN:         1. BV (bacterial vaginosis)  Recurrent infection.  Will empirically try antibiotics with him explained the  - metroNIDAZOLE (FLAGYL) 500 MG tablet; Take 1 tablet (500 mg) by mouth 3 times daily for 7 days  Dispense: 21 tablet; Refill: 0    Pathology of BV.  That at times this may not work.  Ervin Hassan MD  [facility] AND [facility]

## 2019-08-22 ENCOUNTER — OFFICE VISIT (OUTPATIENT)
Dept: FAMILY MEDICINE | Facility: OTHER | Age: 44
End: 2019-08-22
Attending: FAMILY MEDICINE
Payer: MEDICARE

## 2019-08-22 VITALS
TEMPERATURE: 97.6 F | DIASTOLIC BLOOD PRESSURE: 82 MMHG | SYSTOLIC BLOOD PRESSURE: 102 MMHG | RESPIRATION RATE: 18 BRPM | OXYGEN SATURATION: 96 % | WEIGHT: 258.6 LBS | HEART RATE: 76 BPM | BODY MASS INDEX: 39.32 KG/M2

## 2019-08-22 DIAGNOSIS — R22.9 SKIN NODULE: Primary | ICD-10-CM

## 2019-08-22 PROCEDURE — G0463 HOSPITAL OUTPT CLINIC VISIT: HCPCS

## 2019-08-22 PROCEDURE — 99213 OFFICE O/P EST LOW 20 MIN: CPT | Performed by: FAMILY MEDICINE

## 2019-08-22 ASSESSMENT — PAIN SCALES - GENERAL: PAINLEVEL: MODERATE PAIN (4)

## 2019-08-22 ASSESSMENT — PATIENT HEALTH QUESTIONNAIRE - PHQ9: SUM OF ALL RESPONSES TO PHQ QUESTIONS 1-9: 7

## 2019-08-22 NOTE — NURSING NOTE
Pt presents to clinic today for nose pain x 1 month. Pt denies any injury.      Medication Reconciliation: complete  Yovana Toro LPN

## 2019-08-23 NOTE — PROGRESS NOTES
SUBJECTIVE:   Tenzin Ca is a 44 year old male who presents to clinic today for the following health issues: Nose pain    Patient arrives here for nose pain.  He states he is noticed a bump present on the right side for the last couple months.  But his significant other states is been there for about 7 years.  States is becoming more more painful.        Patient Active Problem List    Diagnosis Date Noted     BV (bacterial vaginosis) 07/11/2019     Priority: Medium     Vitamin D deficiency 12/26/2018     Priority: Medium     Slow transit constipation 02/22/2018     Priority: Medium     Hypertriglyceridemia 02/20/2018     Priority: Medium     Major depressive disorder, recurrent episode (H) 02/20/2018     Priority: Medium     Plica of knee 02/20/2018     Priority: Medium     Psoriasis 02/20/2018     Priority: Medium     Schizophrenia (H) 02/20/2018     Priority: Medium     Overview:   hears voices       Torn medial meniscus 02/20/2018     Priority: Medium     Obesity 02/21/2017     Priority: Medium     S/P left knee arthroscopy 07/05/2016     Priority: Medium     Chronic pain of left knee 04/07/2016     Priority: Medium     Psychosis (H) 07/02/2015     Priority: Medium     Erectile dysfunction 04/23/2014     Priority: Medium     Delayed ejaculation 06/03/2013     Priority: Medium     Sprain of lumbar region 06/21/2012     Priority: Medium     Past Medical History:   Diagnosis Date     Mental disorder     Disability related to mental illness     Personal history of other mental and behavioral disorders     2016      Past Surgical History:   Procedure Laterality Date     ARTHROSCOPY KNEE      Left Knee Arthroscopy and Partial Meniscectomy and Chondroplasty     Current Outpatient Medications   Medication Sig Dispense Refill     ARIPiprazole (ABILIFY) 2 MG tablet TAKE 1 TABLET BY MOUTH DAILY ALONG WITH THE 20MG TABLET  2     ARIPiprazole (ABILIFY) 20 MG tablet Take 20 mg by mouth every morning  5     clonazePAM  (KLONOPIN) 0.5 MG tablet TAKE 1 TAB UP TO TWICE A DAY AS NEEDED FOR SEVERE ANXIETY.  2     CLONAZEPAM PO Take 2 mg by mouth 2 times daily       hydrOXYzine (ATARAX) 50 MG tablet TAKE UP TO 2 TABLETS BY MOUTH TWICE DAILY AS NEEDED FOR ANXIETY  1     hydrOXYzine 100 MG CAPS Take 1 capsule by mouth 2 times daily       meloxicam (MOBIC) 7.5 MG tablet Take 1 tablet (7.5 mg) by mouth daily as needed 30 tablet 3     OXcarbazepine (TRILEPTAL) 300 MG tablet Take 300 mg by mouth every morning       OXcarbazepine (TRILEPTAL) 600 MG tablet Take 600 mg by mouth At Bedtime       prazosin (MINIPRESS) 1 MG capsule Take 1 capsule by mouth At Bedtime  0     REXULTI 2 MG tablet Take 1 tablet by mouth daily  0     senna-docusate (SENOKOT-S/PERICOLACE) 8.6-50 MG tablet Take 1 tablet by mouth 2 times daily 120 tablet 5     zolpidem (AMBIEN) 10 MG tablet Take 10 mg by mouth At Bedtime       No Known Allergies    Review of Systems     OBJECTIVE:     /82   Pulse 76   Temp 97.6  F (36.4  C) (Tympanic)   Resp 18   Wt 117.3 kg (258 lb 9.6 oz)   SpO2 96%   BMI 39.32 kg/m    Body mass index is 39.32 kg/m .  Physical Exam   Constitutional: He appears well-developed.   HENT:   Head: Normocephalic.   Nose has about a 4 mm cystic type lesion.  There is a central ulceration present.  It is elevated about 3 mm       Diagnostic Test Results:  none     ASSESSMENT/PLAN:         1. Skin nodule  I thought initially that this may be a little cyst.  I did use a needle to rupture it and I was not able to get any discharge.  This could also be a basal cell carcinoma.  Will have general surgery see patient  - GENERAL SURG ADULT REFERRAL      Ervin Hassan MD  [facility] AND [facility]

## 2019-08-27 ENCOUNTER — OFFICE VISIT (OUTPATIENT)
Dept: SURGERY | Facility: OTHER | Age: 44
End: 2019-08-27
Attending: FAMILY MEDICINE
Payer: MEDICARE

## 2019-08-27 VITALS
WEIGHT: 258 LBS | HEART RATE: 84 BPM | SYSTOLIC BLOOD PRESSURE: 118 MMHG | TEMPERATURE: 98 F | BODY MASS INDEX: 39.23 KG/M2 | DIASTOLIC BLOOD PRESSURE: 78 MMHG | RESPIRATION RATE: 20 BRPM

## 2019-08-27 DIAGNOSIS — R22.9 SKIN NODULE: ICD-10-CM

## 2019-08-27 PROCEDURE — 11441 EXC FACE-MM B9+MARG 0.6-1 CM: CPT | Performed by: SURGERY

## 2019-08-27 PROCEDURE — 88305 TISSUE EXAM BY PATHOLOGIST: CPT

## 2019-08-27 PROCEDURE — G0463 HOSPITAL OUTPT CLINIC VISIT: HCPCS

## 2019-08-27 PROCEDURE — 11440 EXC FACE-MM B9+MARG 0.5 CM/<: CPT | Mod: 51 | Performed by: SURGERY

## 2019-08-27 PROCEDURE — 11440 EXC FACE-MM B9+MARG 0.5 CM/<: CPT | Performed by: SURGERY

## 2019-08-27 ASSESSMENT — PAIN SCALES - GENERAL: PAINLEVEL: MODERATE PAIN (4)

## 2019-08-27 NOTE — NURSING NOTE
"Chief Complaint   Patient presents with     Lesion Removal     lesion on nose       Initial /78 (BP Location: Right arm, Patient Position: Sitting, Cuff Size: Adult Large)   Pulse 84   Temp 98  F (36.7  C)   Resp 20   Wt 117 kg (258 lb)   BMI 39.23 kg/m   Estimated body mass index is 39.23 kg/m  as calculated from the following:    Height as of 7/11/19: 1.727 m (5' 8\").    Weight as of this encounter: 117 kg (258 lb).  Medication Reconciliation: complete  TIMEOUT  Universal Protocol    A. Pre-procedure verification complete: yes   1-relevant information / documentation available, reviewed and properly matched to the patient; 2-consent accurate and complete, 3-equipment and supplies available    B. Site marking complete: N/A  Site marked if not in continuous attendance with patient    C. TIME OUT completed:  Yes  Time Out was conducted just prior to starting procedure to verify the eight required elements: 1-patient identity, 2-consent accurate and complete, 3-position, 4-correct side/site marked (if applicable), 5-procedure, 6-relevant images / results properly labeled and displayed (if applicable), 7-antibiotics / irrigation fluids (if applicable), 8-safety precautions.    Ada Santacruz LPN  "

## 2019-08-27 NOTE — PROGRESS NOTES
Procedure Note      Pre/Post Operative Diagnosis:  Right nose skin lesion, right cheek skin lesion     Procedure:   Removal of  Right nose skin lesion, right cheek skin lesion      Surgeon: Frank Toro MD    Local Anesthesia: 1% lidocaine with 0.25% Marcaine with epinephrine    Indication for the procedure:  This is a 44 year old male  patient referred by Ervin Hassan with right nose and  right cheek skin lesions.       After explaining the risks to include bleeding, infection, recurrence or need for reexcision, and scarring the patient wished to proceed.    Procedure:   The right nose area was prepped and draped in usual sterile fashion with ChloraPrep.   After, adequate local anesthesia, an 1.2 cm X 0.5 cm elliptical skin incision was made to encompass the  0.4 cm lesion with margins.  The skin was closed with 4-0 Monocryl and Dermabond.      The right cheek area was prepped and draped in usual sterile fashion with ChloraPrep.   After, adequate local anesthesia, an 1.5 cm X 0.6 cm elliptical skin incision was made to encompass the  0.5cm lesion with magins.  The skin was closed with 4-0 Monocryl and Dermabond.      Plan:  The patient will be called to review the pathology. Patient will follow up if there any problems with the wound including redness or drainage.      Frank Toro MD....................  8/27/2019   9:06 AM

## 2019-10-02 ENCOUNTER — OFFICE VISIT (OUTPATIENT)
Dept: FAMILY MEDICINE | Facility: OTHER | Age: 44
End: 2019-10-02
Attending: FAMILY MEDICINE
Payer: MEDICARE

## 2019-10-02 VITALS
SYSTOLIC BLOOD PRESSURE: 110 MMHG | HEART RATE: 64 BPM | BODY MASS INDEX: 38.55 KG/M2 | WEIGHT: 254.4 LBS | TEMPERATURE: 97.9 F | DIASTOLIC BLOOD PRESSURE: 70 MMHG | RESPIRATION RATE: 20 BRPM | HEIGHT: 68 IN

## 2019-10-02 DIAGNOSIS — R42 DIZZINESS: ICD-10-CM

## 2019-10-02 PROBLEM — M67.50 PLICA OF KNEE: Status: RESOLVED | Noted: 2018-02-20 | Resolved: 2019-10-02

## 2019-10-02 PROBLEM — N76.0 BV (BACTERIAL VAGINOSIS): Status: RESOLVED | Noted: 2019-07-11 | Resolved: 2019-10-02

## 2019-10-02 PROBLEM — B96.89 BV (BACTERIAL VAGINOSIS): Status: RESOLVED | Noted: 2019-07-11 | Resolved: 2019-10-02

## 2019-10-02 PROCEDURE — 99214 OFFICE O/P EST MOD 30 MIN: CPT | Performed by: FAMILY MEDICINE

## 2019-10-02 PROCEDURE — G0463 HOSPITAL OUTPT CLINIC VISIT: HCPCS

## 2019-10-02 ASSESSMENT — ANXIETY QUESTIONNAIRES
3. WORRYING TOO MUCH ABOUT DIFFERENT THINGS: NOT AT ALL
GAD7 TOTAL SCORE: 0
1. FEELING NERVOUS, ANXIOUS, OR ON EDGE: NOT AT ALL
6. BECOMING EASILY ANNOYED OR IRRITABLE: NOT AT ALL
2. NOT BEING ABLE TO STOP OR CONTROL WORRYING: NOT AT ALL
7. FEELING AFRAID AS IF SOMETHING AWFUL MIGHT HAPPEN: NOT AT ALL
4. TROUBLE RELAXING: NOT AT ALL
5. BEING SO RESTLESS THAT IT IS HARD TO SIT STILL: NOT AT ALL

## 2019-10-02 ASSESSMENT — ENCOUNTER SYMPTOMS
LIGHT-HEADEDNESS: 1
PARESTHESIAS: 0
FACIAL ASYMMETRY: 0
SPEECH DIFFICULTY: 0
HEADACHES: 0
DIZZINESS: 1
PALPITATIONS: 0
NUMBNESS: 0

## 2019-10-02 ASSESSMENT — PAIN SCALES - GENERAL: PAINLEVEL: NO PAIN (0)

## 2019-10-02 ASSESSMENT — PATIENT HEALTH QUESTIONNAIRE - PHQ9: SUM OF ALL RESPONSES TO PHQ QUESTIONS 1-9: 0

## 2019-10-02 ASSESSMENT — MIFFLIN-ST. JEOR: SCORE: 2023.45

## 2019-10-02 NOTE — PROGRESS NOTES
SUBJECTIVE:   Tenzin Ca is a 44 year old male who presents to clinic today for the following health issues:  Dizziness lightheadedness blurry vision  Patient arrives with the above complaints.  He states they have been going on for years.  States also when he returns his head he gets a spinning sensation that is been on and off for years also.  No neurologic complaints.  He does have an appointment with his mental health provider on the ninth.  Patient states he feels like he has constant motion sickness.  He is on numerous medications and clonazepam AbilifyHydroxyzine Trileptal and Ambien.  Nothing seems to make it worse or better.  States he even had an episode coming in.  No palpitations no chest pain.  No known fast heart rate.        Patient Active Problem List    Diagnosis Date Noted     Dizziness 10/02/2019     Priority: Medium     Vitamin D deficiency 12/26/2018     Priority: Medium     Slow transit constipation 02/22/2018     Priority: Medium     Hypertriglyceridemia 02/20/2018     Priority: Medium     Major depressive disorder, recurrent episode (H) 02/20/2018     Priority: Medium     Psoriasis 02/20/2018     Priority: Medium     Schizophrenia (H) 02/20/2018     Priority: Medium     Overview:   hears voices       Torn medial meniscus 02/20/2018     Priority: Medium     Obesity 02/21/2017     Priority: Medium     S/P left knee arthroscopy 07/05/2016     Priority: Medium     Chronic pain of left knee 04/07/2016     Priority: Medium     Psychosis (H) 07/02/2015     Priority: Medium     Erectile dysfunction 04/23/2014     Priority: Medium     Delayed ejaculation 06/03/2013     Priority: Medium     Sprain of lumbar region 06/21/2012     Priority: Medium     Past Medical History:   Diagnosis Date     Mental disorder     Disability related to mental illness     Personal history of other mental and behavioral disorders     2016      Past Surgical History:   Procedure Laterality Date     ARTHROSCOPY KNEE       "Left Knee Arthroscopy and Partial Meniscectomy and Chondroplasty     Current Outpatient Medications   Medication Sig Dispense Refill     ARIPiprazole (ABILIFY) 2 MG tablet TAKE 1 TABLET BY MOUTH DAILY ALONG WITH THE 20MG TABLET  2     ARIPiprazole (ABILIFY) 20 MG tablet Take 20 mg by mouth every morning  5     clonazePAM (KLONOPIN) 0.5 MG tablet TAKE 1 TAB UP TO TWICE A DAY AS NEEDED FOR SEVERE ANXIETY.  2     CLONAZEPAM PO Take 2 mg by mouth 2 times daily       hydrOXYzine (ATARAX) 50 MG tablet TAKE UP TO 2 TABLETS BY MOUTH TWICE DAILY AS NEEDED FOR ANXIETY  1     hydrOXYzine 100 MG CAPS Take 1 capsule by mouth 2 times daily       meloxicam (MOBIC) 7.5 MG tablet Take 1 tablet (7.5 mg) by mouth daily as needed 30 tablet 3     OXcarbazepine (TRILEPTAL) 300 MG tablet Take 300 mg by mouth every morning       OXcarbazepine (TRILEPTAL) 600 MG tablet Take 600 mg by mouth At Bedtime       prazosin (MINIPRESS) 1 MG capsule Take 1 capsule by mouth At Bedtime  0     REXULTI 2 MG tablet Take 1 tablet by mouth daily  0     senna-docusate (SENOKOT-S/PERICOLACE) 8.6-50 MG tablet Take 1 tablet by mouth 2 times daily 120 tablet 5     zolpidem (AMBIEN) 10 MG tablet Take 10 mg by mouth At Bedtime       No Known Allergies    Review of Systems   Cardiovascular: Negative for palpitations.   Neurological: Positive for dizziness and light-headedness. Negative for facial asymmetry, speech difficulty, numbness, headaches and paresthesias.        OBJECTIVE:     /70 (BP Location: Right arm, Patient Position: Sitting, Cuff Size: Adult Large)   Pulse 64   Temp 97.9  F (36.6  C)   Resp 20   Ht 1.727 m (5' 8\")   Wt 115.4 kg (254 lb 6.4 oz)   BMI 38.68 kg/m    Body mass index is 38.68 kg/m .  Physical Exam  Constitutional:       Appearance: Normal appearance.   HENT:      Nose: Nose normal.   Eyes:      Pupils: Pupils are equal, round, and reactive to light.   Cardiovascular:      Rate and Rhythm: Normal rate and regular rhythm. "   Musculoskeletal: Normal range of motion.   Neurological:      General: No focal deficit present.      Mental Status: He is alert.      Comments: Fast alternating movement finger-nose-finger Romberg all normal             ASSESSMENT/PLAN:         1. Dizziness  I had a long review with the patient and staff.  His exam was unremarkable.  I highly suspect that his symptoms are related to his medication.  I did bring up side effects to his psychiatric medications.  He has not had a trial or taper to see how he does recently.  Advised him that he may want to talk to his mental health provider about medication adjustment.  25 to 20 minutes spent with the patient greater than 50% counseling coordinating care discussing side effects to medications.          Ervin Hassan MD  [facility] AND [facility]

## 2019-10-02 NOTE — NURSING NOTE
Patient presents to the clinic for multipal issues. Dizziness, blurred vision that has been on and off for the last 2 years. Patient states this spell started Sunday. Medication Reconciliation Completed.    Buzz Peters LPN  10/2/2019 8:51 AM

## 2019-10-03 ASSESSMENT — ANXIETY QUESTIONNAIRES: GAD7 TOTAL SCORE: 0

## 2019-12-02 ENCOUNTER — APPOINTMENT (OUTPATIENT)
Dept: GENERAL RADIOLOGY | Facility: OTHER | Age: 44
End: 2019-12-02
Attending: FAMILY MEDICINE
Payer: MEDICARE

## 2019-12-02 ENCOUNTER — HOSPITAL ENCOUNTER (EMERGENCY)
Facility: OTHER | Age: 44
Discharge: HOME | End: 2019-12-02
Attending: FAMILY MEDICINE | Admitting: FAMILY MEDICINE
Payer: MEDICARE

## 2019-12-02 ENCOUNTER — APPOINTMENT (OUTPATIENT)
Dept: CT IMAGING | Facility: OTHER | Age: 44
End: 2019-12-02
Attending: FAMILY MEDICINE
Payer: MEDICARE

## 2019-12-02 VITALS
OXYGEN SATURATION: 98 % | TEMPERATURE: 97.2 F | BODY MASS INDEX: 38.68 KG/M2 | HEART RATE: 66 BPM | SYSTOLIC BLOOD PRESSURE: 128 MMHG | HEIGHT: 68 IN | DIASTOLIC BLOOD PRESSURE: 78 MMHG

## 2019-12-02 DIAGNOSIS — R11.2 INTRACTABLE VOMITING WITH NAUSEA, UNSPECIFIED VOMITING TYPE: ICD-10-CM

## 2019-12-02 DIAGNOSIS — R10.84 ABDOMINAL PAIN, GENERALIZED: ICD-10-CM

## 2019-12-02 LAB
ALBUMIN SERPL-MCNC: 4.4 G/DL (ref 3.5–5.7)
ALBUMIN UR-MCNC: NEGATIVE MG/DL
ALP SERPL-CCNC: 82 U/L (ref 34–104)
ALT SERPL W P-5'-P-CCNC: 19 U/L (ref 7–52)
AMMONIA PLAS-SCNC: 22 UMOL/L (ref 16–53)
AMPHETAMINES UR QL SCN: NOT DETECTED
ANION GAP SERPL CALCULATED.3IONS-SCNC: 6 MMOL/L (ref 3–14)
APPEARANCE UR: CLEAR
AST SERPL W P-5'-P-CCNC: 17 U/L (ref 13–39)
BARBITURATES UR QL: NOT DETECTED
BASOPHILS # BLD AUTO: 0 10E9/L (ref 0–0.2)
BASOPHILS NFR BLD AUTO: 0.7 %
BENZODIAZ UR QL: NOT DETECTED
BILIRUB SERPL-MCNC: 0.5 MG/DL (ref 0.3–1)
BILIRUB UR QL STRIP: NEGATIVE
BUN SERPL-MCNC: 12 MG/DL (ref 7–25)
BUPRENORPHINE UR QL: NOT DETECTED NG/ML
CALCIUM SERPL-MCNC: 9.7 MG/DL (ref 8.6–10.3)
CANNABINOIDS UR QL: NOT DETECTED NG/ML
CHLORIDE SERPL-SCNC: 104 MMOL/L (ref 98–107)
CO2 SERPL-SCNC: 29 MMOL/L (ref 21–31)
COCAINE UR QL: NOT DETECTED
COLOR UR AUTO: YELLOW
CREAT SERPL-MCNC: 1.17 MG/DL (ref 0.7–1.3)
D-METHAMPHET UR QL: NOT DETECTED NG/ML
DIFFERENTIAL METHOD BLD: NORMAL
EOSINOPHIL # BLD AUTO: 0.1 10E9/L (ref 0–0.7)
EOSINOPHIL NFR BLD AUTO: 1.7 %
ERYTHROCYTE [DISTWIDTH] IN BLOOD BY AUTOMATED COUNT: 12 % (ref 10–15)
ERYTHROCYTE [SEDIMENTATION RATE] IN BLOOD BY WESTERGREN METHOD: 12 MM/H (ref 1–10)
ETHANOL SERPL-MCNC: <0.01 %
GFR SERPL CREATININE-BSD FRML MDRD: 68 ML/MIN/{1.73_M2}
GLUCOSE SERPL-MCNC: 101 MG/DL (ref 70–105)
GLUCOSE UR STRIP-MCNC: NEGATIVE MG/DL
HCT VFR BLD AUTO: 42 % (ref 40–53)
HGB BLD-MCNC: 14.7 G/DL (ref 13.3–17.7)
HGB UR QL STRIP: NEGATIVE
IMM GRANULOCYTES # BLD: 0 10E9/L (ref 0–0.4)
IMM GRANULOCYTES NFR BLD: 0.5 %
KETONES UR STRIP-MCNC: NEGATIVE MG/DL
LACTATE SERPL-SCNC: 1.9 MMOL/L (ref 0.5–2.2)
LEUKOCYTE ESTERASE UR QL STRIP: NEGATIVE
LIPASE SERPL-CCNC: 11 U/L (ref 11–82)
LYMPHOCYTES # BLD AUTO: 1.3 10E9/L (ref 0.8–5.3)
LYMPHOCYTES NFR BLD AUTO: 22.3 %
MCH RBC QN AUTO: 29.8 PG (ref 26.5–33)
MCHC RBC AUTO-ENTMCNC: 35 G/DL (ref 31.5–36.5)
MCV RBC AUTO: 85 FL (ref 78–100)
METHADONE UR QL SCN: NOT DETECTED
MONOCYTES # BLD AUTO: 0.5 10E9/L (ref 0–1.3)
MONOCYTES NFR BLD AUTO: 8.4 %
NEUTROPHILS # BLD AUTO: 3.8 10E9/L (ref 1.6–8.3)
NEUTROPHILS NFR BLD AUTO: 66.4 %
NITRATE UR QL: NEGATIVE
OPIATES UR QL SCN: NOT DETECTED
OXYCODONE UR QL: NOT DETECTED NG/ML
PCP UR QL SCN: NOT DETECTED
PH UR STRIP: 5.5 PH (ref 5–9)
PLATELET # BLD AUTO: 187 10E9/L (ref 150–450)
POTASSIUM SERPL-SCNC: 4.5 MMOL/L (ref 3.5–5.1)
PROPOXYPH UR QL: NOT DETECTED NG/ML
PROT SERPL-MCNC: 7.3 G/DL (ref 6.4–8.9)
RBC # BLD AUTO: 4.94 10E12/L (ref 4.4–5.9)
SODIUM SERPL-SCNC: 139 MMOL/L (ref 134–144)
SOURCE: NORMAL
SP GR UR STRIP: <1.005 (ref 1–1.03)
TRICYCLICS UR QL SCN: NOT DETECTED NG/ML
TSH SERPL DL<=0.05 MIU/L-ACNC: 4.1 IU/ML (ref 0.34–5.6)
UROBILINOGEN UR STRIP-ACNC: 0.2 EU/DL (ref 0.2–1)
WBC # BLD AUTO: 5.7 10E9/L (ref 4–11)

## 2019-12-02 PROCEDURE — 81003 URINALYSIS AUTO W/O SCOPE: CPT | Mod: XU | Performed by: FAMILY MEDICINE

## 2019-12-02 PROCEDURE — 82140 ASSAY OF AMMONIA: CPT | Performed by: FAMILY MEDICINE

## 2019-12-02 PROCEDURE — 74177 CT ABD & PELVIS W/CONTRAST: CPT

## 2019-12-02 PROCEDURE — 80320 DRUG SCREEN QUANTALCOHOLS: CPT | Mod: XU | Performed by: FAMILY MEDICINE

## 2019-12-02 PROCEDURE — 96365 THER/PROPH/DIAG IV INF INIT: CPT | Performed by: FAMILY MEDICINE

## 2019-12-02 PROCEDURE — 85025 COMPLETE CBC W/AUTO DIFF WBC: CPT | Performed by: FAMILY MEDICINE

## 2019-12-02 PROCEDURE — 74019 RADEX ABDOMEN 2 VIEWS: CPT

## 2019-12-02 PROCEDURE — 99285 EMERGENCY DEPT VISIT HI MDM: CPT | Mod: 25 | Performed by: FAMILY MEDICINE

## 2019-12-02 PROCEDURE — 96366 THER/PROPH/DIAG IV INF ADDON: CPT | Performed by: FAMILY MEDICINE

## 2019-12-02 PROCEDURE — 80307 DRUG TEST PRSMV CHEM ANLYZR: CPT | Performed by: FAMILY MEDICINE

## 2019-12-02 PROCEDURE — 85652 RBC SED RATE AUTOMATED: CPT | Performed by: FAMILY MEDICINE

## 2019-12-02 PROCEDURE — 99284 EMERGENCY DEPT VISIT MOD MDM: CPT | Mod: Z6 | Performed by: FAMILY MEDICINE

## 2019-12-02 PROCEDURE — 25000128 H RX IP 250 OP 636: Performed by: FAMILY MEDICINE

## 2019-12-02 PROCEDURE — 80053 COMPREHEN METABOLIC PANEL: CPT | Performed by: FAMILY MEDICINE

## 2019-12-02 PROCEDURE — 83605 ASSAY OF LACTIC ACID: CPT | Performed by: FAMILY MEDICINE

## 2019-12-02 PROCEDURE — 25800030 ZZH RX IP 258 OP 636: Performed by: FAMILY MEDICINE

## 2019-12-02 PROCEDURE — 83690 ASSAY OF LIPASE: CPT | Performed by: FAMILY MEDICINE

## 2019-12-02 PROCEDURE — 25500064 ZZH RX 255 OP 636: Performed by: FAMILY MEDICINE

## 2019-12-02 PROCEDURE — 36415 COLL VENOUS BLD VENIPUNCTURE: CPT | Performed by: FAMILY MEDICINE

## 2019-12-02 PROCEDURE — 84443 ASSAY THYROID STIM HORMONE: CPT | Performed by: FAMILY MEDICINE

## 2019-12-02 RX ORDER — SODIUM CHLORIDE 9 MG/ML
1000 INJECTION, SOLUTION INTRAVENOUS CONTINUOUS
Status: DISCONTINUED | OUTPATIENT
Start: 2019-12-02 | End: 2019-12-02 | Stop reason: HOSPADM

## 2019-12-02 RX ORDER — ONDANSETRON 4 MG/1
4 TABLET, ORALLY DISINTEGRATING ORAL EVERY 8 HOURS PRN
Qty: 5 TABLET | Refills: 0 | Status: SHIPPED | OUTPATIENT
Start: 2019-12-02 | End: 2020-02-26

## 2019-12-02 RX ADMIN — FAMOTIDINE 20 MG: 20 INJECTION, SOLUTION INTRAVENOUS at 13:45

## 2019-12-02 RX ADMIN — SODIUM CHLORIDE 1000 ML: 9 INJECTION, SOLUTION INTRAVENOUS at 13:44

## 2019-12-02 RX ADMIN — IOHEXOL 150 ML: 350 INJECTION, SOLUTION INTRAVENOUS at 14:56

## 2019-12-02 ASSESSMENT — ENCOUNTER SYMPTOMS
ABDOMINAL PAIN: 1
HEMATOLOGIC/LYMPHATIC NEGATIVE: 1
RESPIRATORY NEGATIVE: 1
BLOOD IN STOOL: 1
NAUSEA: 1
HEADACHES: 0
LIGHT-HEADEDNESS: 1
CONSTIPATION: 1
PSYCHIATRIC NEGATIVE: 1
VOMITING: 1
CARDIOVASCULAR NEGATIVE: 1
DIZZINESS: 1

## 2019-12-02 NOTE — ED TRIAGE NOTES
Pt comes into the ER today from home, alone. Pt reports that for the past 2 weeks he had no appetite and has been vomiting every time he eats. Yandy-umbilical pain. Pt reports his last BM was 2 days ago. Some times it is hard with blood in it.   Reports no medical problems

## 2019-12-02 NOTE — ED PROVIDER NOTES
History     Chief Complaint   Patient presents with     Vomiting     HPI  Tenzin Ca is a 44 year old male who presents for concern of abdominal pain for the past 2 weeks , lower abdominal pain . Pain is constant.  Describes pain as stabbing. Pain worse if standing or walking. Better if lays on his side.  NO fever.  Difficulty eating but has been vomitting every time he eats . He has been able to drink liquids and keep liquids down. Poor appetite. Blood in stool . Increased abdominal pain with bowel movement. Last regular bowel movement couple of days ago.      Allergies:  No Known Allergies    Problem List:    Patient Active Problem List    Diagnosis Date Noted     Dizziness 10/02/2019     Priority: Medium     Vitamin D deficiency 12/26/2018     Priority: Medium     Slow transit constipation 02/22/2018     Priority: Medium     Hypertriglyceridemia 02/20/2018     Priority: Medium     Major depressive disorder, recurrent episode (H) 02/20/2018     Priority: Medium     Psoriasis 02/20/2018     Priority: Medium     Schizophrenia (H) 02/20/2018     Priority: Medium     Overview:   hears voices       Torn medial meniscus 02/20/2018     Priority: Medium     Obesity 02/21/2017     Priority: Medium     S/P left knee arthroscopy 07/05/2016     Priority: Medium     Chronic pain of left knee 04/07/2016     Priority: Medium     Psychosis (H) 07/02/2015     Priority: Medium     Erectile dysfunction 04/23/2014     Priority: Medium     Delayed ejaculation 06/03/2013     Priority: Medium     Sprain of lumbar region 06/21/2012     Priority: Medium        Past Medical History:    Past Medical History:   Diagnosis Date     Mental disorder      Personal history of other mental and behavioral disorders        Past Surgical History:    Past Surgical History:   Procedure Laterality Date     ARTHROSCOPY KNEE      Left Knee Arthroscopy and Partial Meniscectomy and Chondroplasty       Family History:    Family History   Problem Relation  "Age of Onset     Substance Abuse Mother         Alcohol/Drug     Alcoholism Mother         Alcoholism     Substance Abuse Brother         Alcohol/Drug     Alcoholism Brother         Alcoholism     Substance Abuse Sister         Alcohol/Drug     Alcoholism Sister         Alcoholism       Social History:  Marital Status:  Single [1]  Social History     Tobacco Use     Smoking status: Never Smoker     Smokeless tobacco: Never Used   Substance Use Topics     Alcohol use: No     Alcohol/week: 0.0 standard drinks     Drug use: No        Medications:    ARIPiprazole (ABILIFY) 2 MG tablet  ARIPiprazole (ABILIFY) 20 MG tablet  clonazePAM (KLONOPIN) 0.5 MG tablet  CLONAZEPAM PO  hydrOXYzine (ATARAX) 50 MG tablet  hydrOXYzine 100 MG CAPS  meloxicam (MOBIC) 7.5 MG tablet  OXcarbazepine (TRILEPTAL) 300 MG tablet  OXcarbazepine (TRILEPTAL) 600 MG tablet  prazosin (MINIPRESS) 1 MG capsule  REXULTI 2 MG tablet  senna-docusate (SENOKOT-S/PERICOLACE) 8.6-50 MG tablet  zolpidem (AMBIEN) 10 MG tablet          Review of Systems   Respiratory: Negative.    Cardiovascular: Negative.    Gastrointestinal: Positive for abdominal pain, blood in stool, constipation, nausea and vomiting.   Genitourinary: Positive for decreased urine volume.   Neurological: Positive for dizziness and light-headedness. Negative for headaches.   Hematological: Negative.    Psychiatric/Behavioral: Negative.        Physical Exam   BP: (!) 154/89  Pulse: 84  Heart Rate: 83  Temp: 97.2  F (36.2  C)  Height: 172.7 cm (5' 8\")  SpO2: 96 %      Physical Exam  Vitals signs and nursing note reviewed. Exam conducted with a chaperone present.   HENT:      Head: Normocephalic and atraumatic.      Nose: Nose normal.      Mouth/Throat:      Mouth: Mucous membranes are moist.   Eyes:      Pupils: Pupils are equal, round, and reactive to light.   Neck:      Musculoskeletal: Normal range of motion and neck supple.   Cardiovascular:      Rate and Rhythm: Normal rate and regular " rhythm.   Pulmonary:      Effort: Pulmonary effort is normal.      Breath sounds: Normal breath sounds.   Abdominal:      General: Abdomen is flat. Bowel sounds are normal.      Palpations: Abdomen is soft.      Tenderness: There is no guarding or rebound.      Comments: Mild diffuse lower abdominal tenderness without guarding or rebound    Musculoskeletal: Normal range of motion.   Skin:     General: Skin is warm.   Neurological:      General: No focal deficit present.      Mental Status: He is alert.         ED Course        Procedures          Patient presents to ER with concern of 2 week history of lower abdominal pain that has become increasingly severe . Patient triaged to exam room. Medical records reviewed History and exam completed. Differential diagnosis includes , diverticulitis,  Constipation,gasstritis ,  GERD  gastroenteritis, appendiciits. Colitis. Peripheral IV inserted. Fluid bolus ordered. Labs and diagnostics ordered. INitial labs inconclusive. CT abdomen and pelvis ordered for further evaluation after discussion with patient . CT abdomen and pelvis without any acute abnormalities. Discussed normal findings with patient . Recommend patient make sure he continues bowel program ,PPI . Recommend schedule follow up appointment in clinic as he likely will benefit from EGD. Return to ER for worsening or other concerning symptoms   Results for orders placed or performed during the hospital encounter of 12/02/19   CBC with platelets differential     Status: None   Result Value Ref Range    WBC 5.7 4.0 - 11.0 10e9/L    RBC Count 4.94 4.4 - 5.9 10e12/L    Hemoglobin 14.7 13.3 - 17.7 g/dL    Hematocrit 42.0 40.0 - 53.0 %    MCV 85 78 - 100 fl    MCH 29.8 26.5 - 33.0 pg    MCHC 35.0 31.5 - 36.5 g/dL    RDW 12.0 10.0 - 15.0 %    Platelet Count 187 150 - 450 10e9/L    Diff Method Automated Method     % Neutrophils 66.4 %    % Lymphocytes 22.3 %    % Monocytes 8.4 %    % Eosinophils 1.7 %    % Basophils 0.7 %     % Immature Granulocytes 0.5 %    Absolute Neutrophil 3.8 1.6 - 8.3 10e9/L    Absolute Lymphocytes 1.3 0.8 - 5.3 10e9/L    Absolute Monocytes 0.5 0.0 - 1.3 10e9/L    Absolute Eosinophils 0.1 0.0 - 0.7 10e9/L    Absolute Basophils 0.0 0.0 - 0.2 10e9/L    Abs Immature Granulocytes 0.0 0 - 0.4 10e9/L   Comprehensive metabolic panel     Status: None   Result Value Ref Range    Sodium 139 134 - 144 mmol/L    Potassium 4.5 3.5 - 5.1 mmol/L    Chloride 104 98 - 107 mmol/L    Carbon Dioxide 29 21 - 31 mmol/L    Anion Gap 6 3 - 14 mmol/L    Glucose 101 70 - 105 mg/dL    Urea Nitrogen 12 7 - 25 mg/dL    Creatinine 1.17 0.70 - 1.30 mg/dL    GFR Estimate 68 >60 mL/min/[1.73_m2]    GFR Estimate If Black 82 >60 mL/min/[1.73_m2]    Calcium 9.7 8.6 - 10.3 mg/dL    Bilirubin Total 0.5 0.3 - 1.0 mg/dL    Albumin 4.4 3.5 - 5.7 g/dL    Protein Total 7.3 6.4 - 8.9 g/dL    Alkaline Phosphatase 82 34 - 104 U/L    ALT 19 7 - 52 U/L    AST 17 13 - 39 U/L   Ammonia     Status: None   Result Value Ref Range    Ammonia 22 16 - 53 umol/L   Lactic acid     Status: None   Result Value Ref Range    Lactic Acid 1.9 0.5 - 2.2 mmol/L   Lipase     Status: None   Result Value Ref Range    Lipase 11 11 - 82 U/L   Ethanol GH     Status: None   Result Value Ref Range    Ethanol g/dL <0.01 <0.01 %   UA reflex to Microscopic and Culture     Status: None   Result Value Ref Range    Color Urine Yellow     Appearance Urine Clear     Glucose Urine Negative NEG^Negative mg/dL    Bilirubin Urine Negative NEG^Negative    Ketones Urine Negative NEG^Negative mg/dL    Specific Gravity Urine <1.005 1.000 - 1.030    Blood Urine Negative NEG^Negative    pH Urine 5.5 5.0 - 9.0 pH    Protein Albumin Urine Negative NEG^Negative mg/dL    Urobilinogen Urine 0.2 0.2 - 1.0 EU/dL    Nitrite Urine Negative NEG^Negative    Leukocyte Esterase Urine Negative NEG^Negative    Source Midstream Urine    Thyrotropin GH     Status: None   Result Value Ref Range    Thyrotropin 4.10 0.34 -  5.60 IU/mL   Drug of Abuse Screen Urine GH     Status: None   Result Value Ref Range    Amphetamine Qual Urine Not Detected NDET^Not Detected    Benzodiazepine Qual Urine Not Detected NDET^Not Detected    Cocaine Qual Urine Not Detected NDET^Not Detected    Methadone Qual Urine Not Detected NDET^Not Detected    PCP Qual Urine Not Detected NDET^Not Detected    Opiates Qualitative Urine Not Detected NDET^Not Detected    Oxycodone Qualitative Urine Not Detected NDET^Not Detected ng/mL    Propoxyphene Qualitative Urine Not Detected NDET^Not Detected ng/mL    Tricyclic Antidepressants Qual Urine Not Detected NDET^Not Detected ng/mL    Methamphetamine Qualitative Urine Not Detected NDET^Not Detected ng/mL    Barbiturates Qual Urine Not Detected NDET^Not Detected    Cannabinoids Qualitative Urine Not Detected NDET^Not Detected ng/mL    Buprenorphine Qualitative Urine Not Detected NDET^Not Detected ng/mL   Erythrocyte sedimentation rate auto     Status: Abnormal   Result Value Ref Range    Sed Rate 12 (H) 1 - 10 mm/h        Results for orders placed or performed during the hospital encounter of 12/02/19 (from the past 24 hour(s))   CBC with platelets differential   Result Value Ref Range    WBC 5.7 4.0 - 11.0 10e9/L    RBC Count 4.94 4.4 - 5.9 10e12/L    Hemoglobin 14.7 13.3 - 17.7 g/dL    Hematocrit 42.0 40.0 - 53.0 %    MCV 85 78 - 100 fl    MCH 29.8 26.5 - 33.0 pg    MCHC 35.0 31.5 - 36.5 g/dL    RDW 12.0 10.0 - 15.0 %    Platelet Count 187 150 - 450 10e9/L    Diff Method Automated Method     % Neutrophils 66.4 %    % Lymphocytes 22.3 %    % Monocytes 8.4 %    % Eosinophils 1.7 %    % Basophils 0.7 %    % Immature Granulocytes 0.5 %    Absolute Neutrophil 3.8 1.6 - 8.3 10e9/L    Absolute Lymphocytes 1.3 0.8 - 5.3 10e9/L    Absolute Monocytes 0.5 0.0 - 1.3 10e9/L    Absolute Eosinophils 0.1 0.0 - 0.7 10e9/L    Absolute Basophils 0.0 0.0 - 0.2 10e9/L    Abs Immature Granulocytes 0.0 0 - 0.4 10e9/L   Ammonia   Result Value  Ref Range    Ammonia 22 16 - 53 umol/L   Lactic acid   Result Value Ref Range    Lactic Acid 1.9 0.5 - 2.2 mmol/L   XR Abdomen 2 Views    Narrative    PROCEDURE:  XR ABDOMEN 2 VW    HISTORY:  abdominal pain.    TECHNIQUE:  AP and supine radiographs of the abdomen.    COMPARISON:  8/15/16    FINDINGS:     The lung bases are clear. No subdiaphragmatic air is seen.    No dilated loops of small bowel or air-fluid levels are seen.      Impression    IMPRESSION:    No obstruction or free air.    TRAE MENDOZA MD       Medications   0.9% sodium chloride BOLUS (1,000 mLs Intravenous New Bag 12/2/19 1344)     Followed by   sodium chloride 0.9% infusion (has no administration in time range)   famotidine (PEPCID) infusion 20 mg (20 mg Intravenous New Bag 12/2/19 1345)       Assessments & Plan (with Medical Decision Making)     I have reviewed the nursing notes.    I have reviewed the findings, diagnosis, plan and need for follow up with the patient.      New Prescriptions    No medications on file       Final diagnoses:   None   (R10.84) Abdominal pain, generalized      (R11.2) Intractable vomiting with nausea, unspecified vomiting type          12/2/2019   [facility] Fidelina Anderson MD  12/05/19 1958

## 2019-12-02 NOTE — DISCHARGE INSTRUCTIONS
Recommend continue to drink plenty of fluids. You may take zofran as needed for nausea. Continue bowel program to ensure you are having regular formed stools. Schedule a follow up appointment in clinic . Return to ER for worsening or concernng symptoms . You may need to schedule EGD

## 2019-12-02 NOTE — ED AVS SNAPSHOT
[facility]  [address]  [place] [address]  Phone:  [phone]  Fax:  [phone]                                    Tenzin Ca   MRN: [id number]    Department:  [facility]   Date of Visit:  12/2/2019           After Visit Summary Signature Page    I have received my discharge instructions, and my questions have been answered. I have discussed any challenges I see with this plan with the nurse or doctor.    ..........................................................................................................................................  Patient/Patient Representative Signature      ..........................................................................................................................................  Patient Representative Print Name and Relationship to Patient    ..................................................               ................................................  Date                                   Time    ..........................................................................................................................................  Reviewed by Signature/Title    ...................................................              ..............................................  Date                                               Time          22EPIC Rev 08/18

## 2020-01-23 ENCOUNTER — TRANSFERRED RECORDS (OUTPATIENT)
Dept: HEALTH INFORMATION MANAGEMENT | Facility: OTHER | Age: 45
End: 2020-01-23

## 2020-02-26 ENCOUNTER — OFFICE VISIT (OUTPATIENT)
Dept: FAMILY MEDICINE | Facility: OTHER | Age: 45
End: 2020-02-26
Attending: FAMILY MEDICINE
Payer: COMMERCIAL

## 2020-02-26 VITALS
TEMPERATURE: 97.7 F | RESPIRATION RATE: 16 BRPM | OXYGEN SATURATION: 94 % | HEIGHT: 68 IN | WEIGHT: 255.6 LBS | SYSTOLIC BLOOD PRESSURE: 100 MMHG | BODY MASS INDEX: 38.74 KG/M2 | DIASTOLIC BLOOD PRESSURE: 68 MMHG | HEART RATE: 71 BPM

## 2020-02-26 DIAGNOSIS — Z00.00 ROUTINE HISTORY AND PHYSICAL EXAMINATION OF ADULT: Primary | ICD-10-CM

## 2020-02-26 DIAGNOSIS — F20.0 PARANOID SCHIZOPHRENIA (H): ICD-10-CM

## 2020-02-26 DIAGNOSIS — Z72.51 HIGH RISK HETEROSEXUAL BEHAVIOR: ICD-10-CM

## 2020-02-26 DIAGNOSIS — K59.01 SLOW TRANSIT CONSTIPATION: ICD-10-CM

## 2020-02-26 PROBLEM — R42 DIZZINESS: Status: RESOLVED | Noted: 2019-10-02 | Resolved: 2020-02-26

## 2020-02-26 PROCEDURE — 86780 TREPONEMA PALLIDUM: CPT | Mod: ZL | Performed by: FAMILY MEDICINE

## 2020-02-26 PROCEDURE — 99396 PREV VISIT EST AGE 40-64: CPT | Performed by: FAMILY MEDICINE

## 2020-02-26 PROCEDURE — 87491 CHLMYD TRACH DNA AMP PROBE: CPT | Mod: ZL | Performed by: FAMILY MEDICINE

## 2020-02-26 PROCEDURE — 87591 N.GONORRHOEAE DNA AMP PROB: CPT | Mod: ZL | Performed by: FAMILY MEDICINE

## 2020-02-26 PROCEDURE — G0463 HOSPITAL OUTPT CLINIC VISIT: HCPCS

## 2020-02-26 PROCEDURE — 36415 COLL VENOUS BLD VENIPUNCTURE: CPT | Mod: ZL | Performed by: FAMILY MEDICINE

## 2020-02-26 PROCEDURE — 87389 HIV-1 AG W/HIV-1&-2 AB AG IA: CPT | Mod: ZL | Performed by: FAMILY MEDICINE

## 2020-02-26 RX ORDER — AMOXICILLIN 250 MG
1 CAPSULE ORAL 2 TIMES DAILY
Qty: 120 TABLET | Refills: 5 | Status: SHIPPED | OUTPATIENT
Start: 2020-02-26 | End: 2021-03-19

## 2020-02-26 ASSESSMENT — ANXIETY QUESTIONNAIRES
2. NOT BEING ABLE TO STOP OR CONTROL WORRYING: SEVERAL DAYS
6. BECOMING EASILY ANNOYED OR IRRITABLE: NEARLY EVERY DAY
1. FEELING NERVOUS, ANXIOUS, OR ON EDGE: MORE THAN HALF THE DAYS
3. WORRYING TOO MUCH ABOUT DIFFERENT THINGS: MORE THAN HALF THE DAYS
IF YOU CHECKED OFF ANY PROBLEMS ON THIS QUESTIONNAIRE, HOW DIFFICULT HAVE THESE PROBLEMS MADE IT FOR YOU TO DO YOUR WORK, TAKE CARE OF THINGS AT HOME, OR GET ALONG WITH OTHER PEOPLE: SOMEWHAT DIFFICULT
5. BEING SO RESTLESS THAT IT IS HARD TO SIT STILL: NEARLY EVERY DAY
7. FEELING AFRAID AS IF SOMETHING AWFUL MIGHT HAPPEN: SEVERAL DAYS
GAD7 TOTAL SCORE: 15

## 2020-02-26 ASSESSMENT — PATIENT HEALTH QUESTIONNAIRE - PHQ9
5. POOR APPETITE OR OVEREATING: NEARLY EVERY DAY
SUM OF ALL RESPONSES TO PHQ QUESTIONS 1-9: 0

## 2020-02-26 ASSESSMENT — PAIN SCALES - GENERAL: PAINLEVEL: NO PAIN (0)

## 2020-02-26 ASSESSMENT — MIFFLIN-ST. JEOR: SCORE: 2028.89

## 2020-02-26 NOTE — LETTER
February 27, 2020      Tenzin Ca  [address]   [place] [address]        Dear ,    We are writing to inform you of your test results.    Your test results fall within the expected range(s) or remain unchanged from previous results.  Please continue with current treatment plan.    Resulted Orders   GC/Chlamydia by PCR - [facility]   Result Value Ref Range    Specimen Source Urine     Neisseria gonorrhoreae PCR Not Detected NDET^Not Detected      Comment:      NOT DETECTED: Negative for N.gonorrhoeae genomic DNA by "[employer]   real-time,reverse-transcriptase PCR. A negative result does not preclude the   presence of N.gonorrhoeae infection. The results are dependent on proper   collection,transport,processing of the specimen,and the presence of sufficient   DNA to be detected.      Chlamydia Trachomatis PCR Not Detected NDET^Not Detected      Comment:      NOTDETECTED: Negative for C.trachomatis genomic DNA by [employer]   real-time,reverse-transcriptase PCR. A negative result does not preclude the   presence of C.trachomatis infection. The results are dependent on proper   collection,transpoet,processing of specimen, and the presence of sufficient   DNA to be detected.     Treponema Ab w Reflex to RPR and Titer   Result Value Ref Range    Treponema Antibodies Nonreactive NR^Nonreactive       If you have any questions or concerns, please call the clinic at the number listed above.       Sincerely,        Ervin Hassan MD

## 2020-02-26 NOTE — LETTER
February 26, 2020      Tenzin Ca  [address]   [place] [address]        Dear ,    We are writing to inform you of your test results.    Your test results fall within the expected range(s) or remain unchanged from previous results.  Please continue with current treatment plan.    Resulted Orders   GC/Chlamydia by PCR - [facility]   Result Value Ref Range    Specimen Source Urine     Neisseria gonorrhoreae PCR Not Detected NDET^Not Detected      Comment:      NOT DETECTED: Negative for N.gonorrhoeae genomic DNA by [employer]   real-time,reverse-transcriptase PCR. A negative result does not preclude the   presence of N.gonorrhoeae infection. The results are dependent on proper   collection,transport,processing of the specimen,and the presence of sufficient   DNA to be detected.      Chlamydia Trachomatis PCR Not Detected NDET^Not Detected      Comment:      NOTDETECTED: Negative for C.trachomatis genomic DNA by [employer]   real-time,reverse-transcriptase PCR. A negative result does not preclude the   presence of C.trachomatis infection. The results are dependent on proper   collection,transpoet,processing of specimen, and the presence of sufficient   DNA to be detected.         If you have any questions or concerns, please call the clinic at the number listed above.       Sincerely,        Ervin Hassan MD

## 2020-02-26 NOTE — LETTER
" My COPD Action Plan     Name: Tenzin Ca    YOB: 1975   Date: 2/27/2020    My doctor: Ervin Hassan MD   My clinic: [facility]    [address]  [place] [id number] [phone]  My Controller Medicine: { :547773}   Dose: ***     My Rescue Medicine: { :690922}   Dose: ***     My Flare Up Medicine: { :975128}   Dose: ***     My COPD Severity: { :366484}      Use of Oxygen: { :805994::\"Oxygen Not Prescribed \"}     Make sure you've had your pneumonia   vaccines.          GREEN ZONE       Doing well today      Usual level of activity and exercise    Usual amount of cough and mucus    No shortness of breath    Usual level of health (thinking clearly, sleeping well, feel like eating) Actions:      Take daily medicines    Use oxygen as prescribed    Follow regular exercise and diet plan    Avoid cigarette smoke and other irritants that harm the lungs           YELLOW ZONE          Having a bad day or flare up      Short of breath more than usual    A lot more sputum (mucus) than usual    Sputum looks yellow, green, tan, brown or bloody    More coughing or wheezing    Fever or chills    Less energy; trouble completing activities    Trouble thinking or focusing    Using quick relief inhaler or nebulizer more often    Poor sleep; symptoms wake me up    Do not feel like eating Actions:      Get plenty of rest    Take daily medicines    Use quick relief inhaler every *** hours    If you use oxygen, call you doctor to see if you should adjust your oxygen    Do breathing exercises or other things to help you relax    Let a loved one, friend or neighbor know you are feeling worse    Call your care team if you have 2 or more symptoms.  Start taking steroids or antibiotics if directed by your care team           RED ZONE       Need medical care now      Severe shortness of breath (feel you can't breathe)    Fever, chills    Not enough breath to do any activity    Trouble " coughing up mucus, walking or talking    Blood in mucus    Frequent coughing   Rescue medicines are not working    Not able to sleep because of breathing    Feel confused or drowsy    Chest pain    Actions:      Call your health care team.  If you cannot reach your care team, call 911 or go to the emergency room.        Annual Reminders:  Meet with Care Team, Flu Shot every Fall  Pharmacy: [id number] - [place], [place] - [address]

## 2020-02-26 NOTE — NURSING NOTE
Patient here for physical, last eye exam 2018 and last dental exam May 1, 2019. Medication Reconciliation: complete.    Jaquelin Aguilera LPN  2/26/2020 12:48 PM

## 2020-02-26 NOTE — LETTER
February 27, 2020      Tenzin Ca  [address]   [place] [address]        Dear ,    We are writing to inform you of your test results.    Your test results fall within the expected range(s) or remain unchanged from previous results.  Please continue with current treatment plan.    Resulted Orders   GC/Chlamydia by PCR - [facility]   Result Value Ref Range    Specimen Source Urine     Neisseria gonorrhoreae PCR Not Detected NDET^Not Detected      Comment:      NOT DETECTED: Negative for N.gonorrhoeae genomic DNA by [email or web address]   real-time,reverse-transcriptase PCR. A negative result does not preclude the   presence of N.gonorrhoeae infection. The results are dependent on proper   collection,transport,processing of the specimen,and the presence of sufficient   DNA to be detected.      Chlamydia Trachomatis PCR Not Detected NDET^Not Detected      Comment:      NOTDETECTED: Negative for C.trachomatis genomic DNA by [employer]   real-time,reverse-transcriptase PCR. A negative result does not preclude the   presence of C.trachomatis infection. The results are dependent on proper   collection,transpoet,processing of specimen, and the presence of sufficient   DNA to be detected.     HIV Antigen Antibody Combo   Result Value Ref Range    HIV Antigen Antibody Combo Nonreactive NR^Nonreactive          Comment:      HIV-1 p24 Ag & HIV-1/HIV-2 Ab Not Detected   Treponema Ab w Reflex to RPR and Titer   Result Value Ref Range    Treponema Antibodies Nonreactive NR^Nonreactive       If you have any questions or concerns, please call the clinic at the number listed above.       Sincerely,        Ervin Hassan MD

## 2020-02-26 NOTE — PROGRESS NOTES
SUBJECTIVE:   Tenzin Ca is a 44 year old male who presents to clinic today for the following health issues: Physical STD request  The 10-year ASCVD risk score (Edgard ALICEA Jr., et al., 2013) is: 1.7%    Values used to calculate the score:      Age: 44 years      Sex: Male      Is Non- : No      Diabetic: No      Tobacco smoker: No      Systolic Blood Pressure: 100 mmHg      Is BP treated: No      HDL Cholesterol: 30 mg/dL      Total Cholesterol: 181 mg/dL      Patient arrives here for physical and STD requested.  Patient states he is sexually active.  But denies any homosexual activity IV drugs.  Requesting HIV syphilis GC and chlamydia.  He is not complaining of any symptoms.  Otherwise no other complaints.        Patient Active Problem List    Diagnosis Date Noted     Vitamin D deficiency 12/26/2018     Priority: Medium     Slow transit constipation 02/22/2018     Priority: Medium     Hypertriglyceridemia 02/20/2018     Priority: Medium     Major depressive disorder, recurrent episode (H) 02/20/2018     Priority: Medium     Psoriasis 02/20/2018     Priority: Medium     Schizophrenia (H) 02/20/2018     Priority: Medium     Overview:   hears voices       Torn medial meniscus 02/20/2018     Priority: Medium     Obesity 02/21/2017     Priority: Medium     S/P left knee arthroscopy 07/05/2016     Priority: Medium     Psychosis (H) 07/02/2015     Priority: Medium     Erectile dysfunction 04/23/2014     Priority: Medium     Delayed ejaculation 06/03/2013     Priority: Medium     Past Medical History:   Diagnosis Date     Mental disorder     Disability related to mental illness     Personal history of other mental and behavioral disorders     2016      Past Surgical History:   Procedure Laterality Date     ARTHROSCOPY KNEE      Left Knee Arthroscopy and Partial Meniscectomy and Chondroplasty     Current Outpatient Medications   Medication Sig Dispense Refill     ARIPiprazole (ABILIFY) 20 MG  "tablet Take 20 mg by mouth every morning  5     clonazePAM (KLONOPIN) 0.5 MG tablet TAKE 1 TAB UP TO TWICE A DAY AS NEEDED FOR SEVERE ANXIETY.  2     hydrOXYzine (ATARAX) 50 MG tablet TAKE UP TO 2 TABLETS BY MOUTH TWICE DAILY AS NEEDED FOR ANXIETY  1     OXcarbazepine (TRILEPTAL) 300 MG tablet Take 300 mg by mouth every morning       OXcarbazepine (TRILEPTAL) 600 MG tablet Take 600 mg by mouth At Bedtime       senna-docusate (SENOKOT-S/PERICOLACE) 8.6-50 MG tablet Take 1 tablet by mouth 2 times daily 120 tablet 5     zolpidem (AMBIEN) 10 MG tablet Take 10 mg by mouth At Bedtime       No Known Allergies    Review of Systems   Constitutional: Negative for chills and fever.   Eyes: Negative.    Respiratory: Negative.    Cardiovascular: Negative for chest pain.   Genitourinary: Negative.    Neurological: Negative for dizziness.   Psychiatric/Behavioral: Negative.         OBJECTIVE:     /68   Pulse 71   Temp 97.7  F (36.5  C)   Resp 16   Ht 1.727 m (5' 8\")   Wt 115.9 kg (255 lb 9.6 oz)   SpO2 94%   BMI 38.86 kg/m    Body mass index is 38.86 kg/m .  Physical Exam  Constitutional:       Appearance: Normal appearance.   HENT:      Right Ear: Tympanic membrane normal.      Left Ear: Tympanic membrane normal.   Eyes:      Pupils: Pupils are equal, round, and reactive to light.   Cardiovascular:      Rate and Rhythm: Normal rate and regular rhythm.   Pulmonary:      Breath sounds: Normal breath sounds.   Neurological:      General: No focal deficit present.      Mental Status: He is alert.   Psychiatric:         Mood and Affect: Mood normal.         Thought Content: Thought content normal.      Comments: Speech is not tangential and he is able to keep on track         Diagnostic Test Results:  Results for orders placed or performed in visit on 02/26/20   GC/Chlamydia by PCR - [facility]     Status: None   Result Value Ref Range    Specimen Source Urine     Neisseria gonorrhoreae PCR Not Detected NDET^Not Detected    " Chlamydia Trachomatis PCR Not Detected NDET^Not Detected       ASSESSMENT/PLAN:         1. Slow transit constipation    - senna-docusate (SENOKOT-S/PERICOLACE) 8.6-50 MG tablet; Take 1 tablet by mouth 2 times daily  Dispense: 120 tablet; Refill: 5    2. Routine history and physical examination of adult  Satisfactory    3. Paranoid schizophrenia (H)  Currently stable.  Followed by psychiatry    4. High risk heterosexual behavior  Lab will be sent currently unremarkable.  - GC/Chlamydia by PCR - [facility]  - Treponema Ab w Reflex to RPR and Titer; Future  - HIV Antigen Antibody Combo; Future  - HIV Antigen Antibody Combo  - Treponema Ab w Reflex to RPR and Titer      Ervin Hassan MD  [facility] AND [facility]

## 2020-02-27 LAB
HIV 1+2 AB+HIV1 P24 AG SERPL QL IA: NONREACTIVE
T PALLIDUM AB SER QL: NONREACTIVE

## 2020-02-27 ASSESSMENT — ENCOUNTER SYMPTOMS
DIZZINESS: 0
CHILLS: 0
PSYCHIATRIC NEGATIVE: 1
EYES NEGATIVE: 1
RESPIRATORY NEGATIVE: 1
FEVER: 0

## 2020-02-27 ASSESSMENT — ANXIETY QUESTIONNAIRES: GAD7 TOTAL SCORE: 15

## 2020-03-11 ENCOUNTER — OFFICE VISIT (OUTPATIENT)
Dept: FAMILY MEDICINE | Facility: OTHER | Age: 45
End: 2020-03-11
Attending: NURSE PRACTITIONER
Payer: COMMERCIAL

## 2020-03-11 VITALS
BODY MASS INDEX: 40.49 KG/M2 | WEIGHT: 267.13 LBS | TEMPERATURE: 97.3 F | HEIGHT: 68 IN | DIASTOLIC BLOOD PRESSURE: 76 MMHG | SYSTOLIC BLOOD PRESSURE: 126 MMHG | OXYGEN SATURATION: 97 % | RESPIRATION RATE: 20 BRPM | HEART RATE: 77 BPM

## 2020-03-11 DIAGNOSIS — N52.9 ERECTILE DYSFUNCTION, UNSPECIFIED ERECTILE DYSFUNCTION TYPE: ICD-10-CM

## 2020-03-11 DIAGNOSIS — L98.9 LESION OF SKIN OF NOSE: ICD-10-CM

## 2020-03-11 DIAGNOSIS — R35.0 URINARY FREQUENCY: Primary | ICD-10-CM

## 2020-03-11 LAB
ALBUMIN UR-MCNC: NEGATIVE MG/DL
APPEARANCE UR: CLEAR
BILIRUB UR QL STRIP: NEGATIVE
COLOR UR AUTO: YELLOW
GLUCOSE UR STRIP-MCNC: NEGATIVE MG/DL
HGB UR QL STRIP: NEGATIVE
KETONES UR STRIP-MCNC: NEGATIVE MG/DL
LEUKOCYTE ESTERASE UR QL STRIP: NEGATIVE
NITRATE UR QL: NEGATIVE
PH UR STRIP: 7 PH (ref 5–7)
SOURCE: NORMAL
SP GR UR STRIP: 1.01 (ref 1–1.03)
UROBILINOGEN UR STRIP-MCNC: NORMAL MG/DL (ref 0–2)

## 2020-03-11 PROCEDURE — 99214 OFFICE O/P EST MOD 30 MIN: CPT | Performed by: NURSE PRACTITIONER

## 2020-03-11 PROCEDURE — 81003 URINALYSIS AUTO W/O SCOPE: CPT | Mod: ZL | Performed by: NURSE PRACTITIONER

## 2020-03-11 PROCEDURE — G0463 HOSPITAL OUTPT CLINIC VISIT: HCPCS

## 2020-03-11 RX ORDER — CIPROFLOXACIN 500 MG/1
500 TABLET, FILM COATED ORAL 2 TIMES DAILY
Qty: 28 TABLET | Refills: 0 | Status: CANCELLED | OUTPATIENT
Start: 2020-03-11 | End: 2020-03-25

## 2020-03-11 ASSESSMENT — ENCOUNTER SYMPTOMS
FREQUENCY: 0
FEVER: 0
WOUND: 1
CHILLS: 0
HEMATURIA: 0
DYSURIA: 0
DIFFICULTY URINATING: 0

## 2020-03-11 ASSESSMENT — MIFFLIN-ST. JEOR: SCORE: 2081.17

## 2020-03-11 ASSESSMENT — PAIN SCALES - GENERAL: PAINLEVEL: MODERATE PAIN (4)

## 2020-03-11 NOTE — NURSING NOTE
Patient presents to clinic experiencing cyst on right side of nose.  Also, he is experiencing urinary frequency with incontinence and pain rated at 5.  Also, bilateral ear sensitivity.    Medication Reconciliation: complete    Christine Puri LPN

## 2020-03-11 NOTE — PROGRESS NOTES
"  SUBJECTIVE:   Tenzin Ca is a 44 year old male who presents to clinic today for the following health issues:    HPI  Patient presents for evaluation of errectile dysfunction. Reports this occurred over the last 2 weeks. He denies problems in the past. He has a girlfriend who he is sexually active with. Recently tested for STI that was negative. He also frequently masturbates and has not been able to get an erection or ejaculate. This has become increasingly frustrating for the patient. He also notes more urine dribbling and some perineal pain. \"It seems to be at the base of my penis and my scrotum\" No fever or chills. No discharge from penis. No scrotal masses or pain. No history of hernia.   No changes to medication. On antipsychotics.     He also would like the cyst removed on his nose. It was removed on 8/27/2019 by general surgery and found to be a complex nevus with superficial dermal fibrosis. He notes it has since re-occurred and he would like it removed again.        Patient Active Problem List    Diagnosis Date Noted     Vitamin D deficiency 12/26/2018     Priority: Medium     Slow transit constipation 02/22/2018     Priority: Medium     Hypertriglyceridemia 02/20/2018     Priority: Medium     Major depressive disorder, recurrent episode (H) 02/20/2018     Priority: Medium     Psoriasis 02/20/2018     Priority: Medium     Schizophrenia (H) 02/20/2018     Priority: Medium     Overview:   hears voices       Torn medial meniscus 02/20/2018     Priority: Medium     Obesity 02/21/2017     Priority: Medium     S/P left knee arthroscopy 07/05/2016     Priority: Medium     Psychosis (H) 07/02/2015     Priority: Medium     Erectile dysfunction 04/23/2014     Priority: Medium     Delayed ejaculation 06/03/2013     Priority: Medium     Past Medical History:   Diagnosis Date     Mental disorder     Disability related to mental illness     Personal history of other mental and behavioral disorders     2016      Past " "Surgical History:   Procedure Laterality Date     ARTHROSCOPY KNEE      Left Knee Arthroscopy and Partial Meniscectomy and Chondroplasty       Review of Systems   Constitutional: Negative for chills and fever.   Genitourinary: Positive for decreased urine volume, impotence and testicular pain. Negative for difficulty urinating, discharge, dysuria, frequency, genital sores, hematuria, penile pain, penile swelling, scrotal swelling and urgency.   Skin: Positive for wound.        OBJECTIVE:     /76 (BP Location: Right arm, Patient Position: Sitting, Cuff Size: Adult Large)   Pulse 77   Temp 97.3  F (36.3  C) (Tympanic)   Resp 20   Ht 1.727 m (5' 8\")   Wt 121.2 kg (267 lb 2 oz)   SpO2 97%   BMI 40.62 kg/m    Body mass index is 40.62 kg/m .  Physical Exam  Constitutional:       Appearance: He is obese.   Abdominal:      Hernia: There is no hernia in the left inguinal area or right inguinal area.   Genitourinary:     Pubic Area: No rash.       Penis: Normal and circumcised.       Scrotum/Testes: Normal.         Right: Mass, tenderness or swelling not present.         Left: Mass, tenderness or swelling not present.   Neurological:      Mental Status: He is alert.      No pain with prostate massage.   Declined chaperone.     Diagnostic Test Results:  Results for orders placed or performed in visit on 03/11/20 (from the past 24 hour(s))   UA reflex to Microscopic   Result Value Ref Range    Color Urine Yellow     Appearance Urine Clear     Glucose Urine Negative NEG^Negative mg/dL    Bilirubin Urine Negative NEG^Negative    Ketones Urine Negative NEG^Negative mg/dL    Specific Gravity Urine 1.008 1.003 - 1.035    Blood Urine Negative NEG^Negative    pH Urine 7.0 5.0 - 7.0 pH    Protein Albumin Urine Negative NEG^Negative mg/dL    Urobilinogen mg/dL Normal 0.0 - 2.0 mg/dL    Nitrite Urine Negative NEG^Negative    Leukocyte Esterase Urine Negative NEG^Negative    Source Midstream Urine        ASSESSMENT/PLAN:   1. " Urinary frequency  UA negative  - UA reflex to Microscopic    2. Lesion of skin of nose  Will refer patient back to general surgery for evaluation of lesion. Likely this is fibrous tissue.   - GENERAL SURG ADULT REFERRAL; Future    3. Erectile dysfunction, unspecified erectile dysfunction type  Unknown cause for ED. Unlikely to be prostatitis due to no fever/chills or tenderness to prostate. Exam is not specific. Patient is tender in scrotum, groin and at base of penis and near rectum. Suggested further evaluation with urology. Patient is in agreement with this plan.   - UROLOGY ADULT REFERRAL    [facility]

## 2020-03-18 ENCOUNTER — OFFICE VISIT (OUTPATIENT)
Dept: UROLOGY | Facility: OTHER | Age: 45
End: 2020-03-18
Attending: NURSE PRACTITIONER
Payer: COMMERCIAL

## 2020-03-18 VITALS
DIASTOLIC BLOOD PRESSURE: 76 MMHG | HEART RATE: 76 BPM | WEIGHT: 265 LBS | RESPIRATION RATE: 20 BRPM | BODY MASS INDEX: 40.29 KG/M2 | SYSTOLIC BLOOD PRESSURE: 102 MMHG

## 2020-03-18 DIAGNOSIS — N52.9 ERECTILE DYSFUNCTION, UNSPECIFIED ERECTILE DYSFUNCTION TYPE: ICD-10-CM

## 2020-03-18 PROCEDURE — 99203 OFFICE O/P NEW LOW 30 MIN: CPT | Performed by: UROLOGY

## 2020-03-18 PROCEDURE — G0463 HOSPITAL OUTPT CLINIC VISIT: HCPCS

## 2020-03-18 RX ORDER — SILDENAFIL CITRATE 20 MG/1
TABLET ORAL
Qty: 30 TABLET | Refills: 11 | Status: SHIPPED | OUTPATIENT
Start: 2020-03-18 | End: 2021-03-10

## 2020-03-18 ASSESSMENT — PAIN SCALES - GENERAL: PAINLEVEL: MILD PAIN (3)

## 2020-03-18 NOTE — NURSING NOTE
Pt presents to clinic for Ed consult    Review of Systems:    Weight loss:    No     Recent fever/chills:  No   Night sweats:   No  Current skin rash:  No   Recent hair loss:  No  Heat intolerance:  No   Cold intolerance:  No  Chest pain:   No   Palpitations:   No  Shortness of breath:  No   Wheezing:   No  Constipation:    No   Diarrhea:   No   Nausea:   No   Vomiting:   Yes   Kidney/side pain:  No   Back pain:   No  Frequent headaches:  No   Dizziness:     Yes  Leg swelling:   No   Calf pain:    No    Parents, brothers or sisters with history of kidney cancer:   No  Parents, brothers or sisters with history of bladder cancer: No  Father or brother with history of prostate cancer:  No

## 2020-03-18 NOTE — PROGRESS NOTES
Type of Visit  NPV    Chief Complaint  Erectile dysfunction    HPI  Mr. Ca is a 44 year old male with history of schizophrenia who presents with erectile dysfunction.  His ED issues started about 5 years ago.  He has tried nothing in the past.  Currently he is using nothing.  He rates his erectile rigidity as 4/10 with treatment.    He reports taking the previous PDE5 inhibitors correctly  He does not take oral nitrates for chest pain.      Past Medical History  He  has a past medical history of Mental disorder and Personal history of other mental and behavioral disorders.  Patient Active Problem List   Diagnosis     Psychosis (H)     Delayed ejaculation     Erectile dysfunction     Hypertriglyceridemia     Major depressive disorder, recurrent episode (H)     Obesity     Psoriasis     Schizophrenia (H)     Torn medial meniscus     S/P left knee arthroscopy     Slow transit constipation     Vitamin D deficiency       Past Surgical History  He  has a past surgical history that includes Arthroscopy knee.    Medications  He has a current medication list which includes the following prescription(s): aripiprazole, clonazepam, hydroxyzine, oxcarbazepine, oxcarbazepine, senna-docusate, and zolpidem.    Allergies  No Known Allergies    Social History  He  reports that he has never smoked. He has never used smokeless tobacco. He reports that he does not drink alcohol or use drugs.  No drug abuse.    Family History  Family History   Problem Relation Age of Onset     Substance Abuse Mother         Alcohol/Drug     Alcoholism Mother         Alcoholism     Substance Abuse Brother         Alcohol/Drug     Alcoholism Brother         Alcoholism     Substance Abuse Sister         Alcohol/Drug     Alcoholism Sister         Alcoholism       Review of Systems  I personally reviewed the ROS with the patient.    Nursing Notes:   Radha Cano LPN  3/18/2020  1:16 PM  Signed  Pt presents to clinic for Ed consult    Review of  Systems:    Weight loss:    No     Recent fever/chills:  No   Night sweats:   No  Current skin rash:  No   Recent hair loss:  No  Heat intolerance:  No   Cold intolerance:  No  Chest pain:   No   Palpitations:   No  Shortness of breath:  No   Wheezing:   No  Constipation:    No   Diarrhea:   No   Nausea:   No   Vomiting:   Yes   Kidney/side pain:  No   Back pain:   No  Frequent headaches:  No   Dizziness:     Yes  Leg swelling:   No   Calf pain:    No    Parents, brothers or sisters with history of kidney cancer:   No  Parents, brothers or sisters with history of bladder cancer: No  Father or brother with history of prostate cancer:  No      Physical Exam  Vitals:    03/18/20 1304   BP: 102/76   BP Location: Left arm   Patient Position: Sitting   Cuff Size: Adult Large   Pulse: 76   Resp: 20   Weight: 120.2 kg (265 lb)     Constitutional: No acute distress.  Alert and cooperative   Head: NCAT  Eyes: Conjunctivae normal  Cardiovascular: Regular rate  Pulmonary/Chest: Respirations are even and non-labored bilaterally, no audible wheezing  Abdominal: Soft. No distension, tenderness, masses or guarding.   Neurological: A + O x 3.  Cranial Nerves II-XII grossly intact.  Extremities: STEVO x 4, Warm. No clubbing.  No cyanosis.    Skin: Pink, warm and dry.  No visible rashes noted.  Psychiatric:  Normal mood and affect  Back:  No left CVA tenderness.  No right CVA tenderness.  Genitourinary:  Nonpalpable bladder    Assessment  Mr. Ca is a 44 year old male with erectile dysfunction.    We discussed treatment options including oral PDE5- medication, Trimix injections, vacuum erection devices and implantable penile prostheses.    He was informed of the most common side effects of Viagra such as headache, flushing, nausea and visual changes (such as blurred vision).    Plan  Start sildenafil 60-100mg by mouth 1 hour prior to sexual activity (empty stomach).  Follow-up in 1 year if refills are needed

## 2020-03-19 ENCOUNTER — TELEPHONE (OUTPATIENT)
Dept: UROLOGY | Facility: OTHER | Age: 45
End: 2020-03-19

## 2020-03-19 NOTE — TELEPHONE ENCOUNTER
[facility] notified that per Jose Wheeler MD patient needs to pay out of pocket for the medication.  Yovana Quispe RN......March 19, 2020...2:27 PM

## 2020-03-23 ENCOUNTER — VIRTUAL VISIT (OUTPATIENT)
Dept: FAMILY MEDICINE | Facility: OTHER | Age: 45
End: 2020-03-23
Attending: FAMILY MEDICINE
Payer: COMMERCIAL

## 2020-03-23 VITALS — BODY MASS INDEX: 40.14 KG/M2 | WEIGHT: 264 LBS

## 2020-03-23 DIAGNOSIS — J00 ACUTE NASOPHARYNGITIS: Primary | ICD-10-CM

## 2020-03-23 PROCEDURE — 99212 OFFICE O/P EST SF 10 MIN: CPT | Mod: 95 | Performed by: FAMILY MEDICINE

## 2020-03-23 RX ORDER — GUAIFENESIN 600 MG/1
1200 TABLET, EXTENDED RELEASE ORAL 2 TIMES DAILY
Qty: 30 TABLET | Refills: 0 | Status: SHIPPED | OUTPATIENT
Start: 2020-03-23 | End: 2020-03-27

## 2020-03-23 RX ORDER — IBUPROFEN 200 MG
200 TABLET ORAL EVERY 4 HOURS PRN
Qty: 30 TABLET | Refills: 1 | Status: SHIPPED | OUTPATIENT
Start: 2020-03-23 | End: 2021-11-22

## 2020-03-23 ASSESSMENT — PAIN SCALES - GENERAL: PAINLEVEL: MILD PAIN (3)

## 2020-03-23 NOTE — PROGRESS NOTES
"Tenzin Ca is a 44 year old male who is being evaluated via a billable telephone visit.    Patient requesting to have visit with Dr. Hassan for cold symptoms that started 3 days prior. Sneezing, cough, upset stomach, runny nose and stuffy nose along with a sore throat. He is looking to get some cough syrup.     The patient has been notified of following:     \"This telephone visit will be conducted via a call between you and your physician/provider. We have found that certain health care needs can be provided without the need for a physical exam.  This service lets us provide the care you need with a short phone conversation.  If a prescription is necessary we can send it directly to your pharmacy.  If lab work is needed we can place an order for that and you can then stop by our lab to have the test done at a later time.    If during the course of the call the physician/provider feels a telephone visit is not appropriate, you will not be charged for this service.\"     Tenzin Ca complains of    Chief Complaint   Patient presents with     URI     3 days        I have reviewed and updated the patient's Past Medical History, Social History, Family History and Medication List.    ALLERGIES  Patient has no known allergies.    Schizophrenia  Additional provider notes: Patient calls stating he is got a stuffy nose.  He reports 3 days.  Sore throat.  Slight cough but nonproductive.  No shortness of breath.  He has not taken any medication.  No fevers but states he feels slightly chilled.  His stomach is upset.  No exposures that he is aware of.  Duration of complaint: 3 days       Assessment/Plan:  URI    Phone call duration: 8 minutes     Ervin Hassan MD on 3/23/2020 at 9:02 AM          "

## 2020-03-27 DIAGNOSIS — J00 ACUTE NASOPHARYNGITIS: ICD-10-CM

## 2020-03-27 RX ORDER — GUAIFENESIN 600 MG/1
1200 TABLET, EXTENDED RELEASE ORAL 2 TIMES DAILY
Qty: 30 TABLET | Refills: 0 | Status: SHIPPED | OUTPATIENT
Start: 2020-03-27 | End: 2021-05-12

## 2020-07-02 ENCOUNTER — OFFICE VISIT (OUTPATIENT)
Dept: FAMILY MEDICINE | Facility: OTHER | Age: 45
End: 2020-07-02
Attending: NURSE PRACTITIONER
Payer: COMMERCIAL

## 2020-07-02 ENCOUNTER — HOSPITAL ENCOUNTER (OUTPATIENT)
Dept: GENERAL RADIOLOGY | Facility: OTHER | Age: 45
End: 2020-07-02
Attending: NURSE PRACTITIONER
Payer: COMMERCIAL

## 2020-07-02 VITALS
SYSTOLIC BLOOD PRESSURE: 108 MMHG | TEMPERATURE: 98.6 F | HEART RATE: 80 BPM | RESPIRATION RATE: 18 BRPM | OXYGEN SATURATION: 96 % | BODY MASS INDEX: 38.47 KG/M2 | WEIGHT: 253 LBS | DIASTOLIC BLOOD PRESSURE: 74 MMHG

## 2020-07-02 DIAGNOSIS — S92.415A CLOSED NONDISPLACED FRACTURE OF PROXIMAL PHALANX OF LEFT GREAT TOE, INITIAL ENCOUNTER: Primary | ICD-10-CM

## 2020-07-02 DIAGNOSIS — M79.89 PAIN AND SWELLING OF TOE OF RIGHT FOOT: ICD-10-CM

## 2020-07-02 DIAGNOSIS — M79.674 PAIN AND SWELLING OF TOE OF RIGHT FOOT: ICD-10-CM

## 2020-07-02 LAB
BASOPHILS # BLD AUTO: 0 10E9/L (ref 0–0.2)
BASOPHILS NFR BLD AUTO: 0.7 %
DIFFERENTIAL METHOD BLD: NORMAL
EOSINOPHIL # BLD AUTO: 0.1 10E9/L (ref 0–0.7)
EOSINOPHIL NFR BLD AUTO: 2 %
ERYTHROCYTE [DISTWIDTH] IN BLOOD BY AUTOMATED COUNT: 11.8 % (ref 10–15)
HCT VFR BLD AUTO: 42 % (ref 40–53)
HGB BLD-MCNC: 14.4 G/DL (ref 13.3–17.7)
IMM GRANULOCYTES # BLD: 0.1 10E9/L (ref 0–0.4)
IMM GRANULOCYTES NFR BLD: 0.9 %
LYMPHOCYTES # BLD AUTO: 1.4 10E9/L (ref 0.8–5.3)
LYMPHOCYTES NFR BLD AUTO: 23.9 %
MCH RBC QN AUTO: 29.6 PG (ref 26.5–33)
MCHC RBC AUTO-ENTMCNC: 34.3 G/DL (ref 31.5–36.5)
MCV RBC AUTO: 86 FL (ref 78–100)
MONOCYTES # BLD AUTO: 0.5 10E9/L (ref 0–1.3)
MONOCYTES NFR BLD AUTO: 8.7 %
NEUTROPHILS # BLD AUTO: 3.7 10E9/L (ref 1.6–8.3)
NEUTROPHILS NFR BLD AUTO: 63.8 %
PLATELET # BLD AUTO: 230 10E9/L (ref 150–450)
RBC # BLD AUTO: 4.86 10E12/L (ref 4.4–5.9)
URATE SERPL-MCNC: 6 MG/DL (ref 4.4–7.6)
WBC # BLD AUTO: 5.9 10E9/L (ref 4–11)

## 2020-07-02 PROCEDURE — 36415 COLL VENOUS BLD VENIPUNCTURE: CPT | Mod: ZL | Performed by: NURSE PRACTITIONER

## 2020-07-02 PROCEDURE — 99213 OFFICE O/P EST LOW 20 MIN: CPT | Performed by: NURSE PRACTITIONER

## 2020-07-02 PROCEDURE — 85025 COMPLETE CBC W/AUTO DIFF WBC: CPT | Mod: ZL | Performed by: NURSE PRACTITIONER

## 2020-07-02 PROCEDURE — G0463 HOSPITAL OUTPT CLINIC VISIT: HCPCS | Mod: 25

## 2020-07-02 PROCEDURE — 73660 X-RAY EXAM OF TOE(S): CPT | Mod: RT

## 2020-07-02 PROCEDURE — G0463 HOSPITAL OUTPT CLINIC VISIT: HCPCS

## 2020-07-02 PROCEDURE — 84550 ASSAY OF BLOOD/URIC ACID: CPT | Mod: ZL | Performed by: NURSE PRACTITIONER

## 2020-07-02 ASSESSMENT — PAIN SCALES - GENERAL: PAINLEVEL: SEVERE PAIN (6)

## 2020-07-02 NOTE — PROGRESS NOTES
HPI:    Tenzin Ca is a 44 year old male who presents to clinic today for right foot pain. Has pain and swelling to right great toe.  He reports he fell 2 weeks ago and twisted his foot and have significant pain to his right great toe.  Still is very painful and swollen.  He has increased pain when he walks.  Has been treating this with aspirin and ice.  No history of fractures or concerns in the past.    Past Medical History:   Diagnosis Date     Mental disorder     Disability related to mental illness     Personal history of other mental and behavioral disorders     2016       Past Surgical History:   Procedure Laterality Date     ARTHROSCOPY KNEE      Left Knee Arthroscopy and Partial Meniscectomy and Chondroplasty       Family History   Problem Relation Age of Onset     Substance Abuse Mother         Alcohol/Drug     Alcoholism Mother         Alcoholism     Substance Abuse Brother         Alcohol/Drug     Alcoholism Brother         Alcoholism     Substance Abuse Sister         Alcohol/Drug     Alcoholism Sister         Alcoholism       Social History     Socioeconomic History     Marital status: Single     Spouse name: Not on file     Number of children: Not on file     Years of education: Not on file     Highest education level: Not on file   Occupational History     Not on file   Social Needs     Financial resource strain: Not on file     Food insecurity     Worry: Not on file     Inability: Not on file     Transportation needs     Medical: Not on file     Non-medical: Not on file   Tobacco Use     Smoking status: Never Smoker     Smokeless tobacco: Never Used   Substance and Sexual Activity     Alcohol use: No     Alcohol/week: 0.0 standard drinks     Drug use: No     Sexual activity: Yes     Partners: Female     Comment: doctor to address    Lifestyle     Physical activity     Days per week: Not on file     Minutes per session: Not on file     Stress: Not on file   Relationships     Social connections      Talks on phone: Not on file     Gets together: Not on file     Attends [employer] service: Not on file     Active member of club or organization: Not on file     Attends meetings of clubs or organizations: Not on file     Relationship status: Not on file     Intimate partner violence     Fear of current or ex partner: Not on file     Emotionally abused: Not on file     Physically abused: Not on file     Forced sexual activity: Not on file   Other Topics Concern     Parent/sibling w/ CABG, MI or angioplasty before 65F 55M? Not Asked   Social History Narrative    Disabled secondary to schizophrenia. Lives independently in his own apartment.       Current Outpatient Medications   Medication Sig Dispense Refill     ARIPiprazole (ABILIFY) 20 MG tablet Take 20 mg by mouth every morning  5     clonazePAM (KLONOPIN) 0.5 MG tablet TAKE 1 TAB UP TO TWICE A DAY AS NEEDED FOR SEVERE ANXIETY.  2     guaiFENesin (MUCINEX) 600 MG 12 hr tablet Take 2 tablets (1,200 mg) by mouth 2 times daily 30 tablet 0     hydrOXYzine (ATARAX) 50 MG tablet TAKE UP TO 2 TABLETS BY MOUTH TWICE DAILY AS NEEDED FOR ANXIETY  1     ibuprofen (ADVIL/MOTRIN) 200 MG tablet Take 1 tablet (200 mg) by mouth every 4 hours as needed for mild pain 30 tablet 1     order for DME Equipment being ordered: surgical shoe 1 each 0     OXcarbazepine (TRILEPTAL) 300 MG tablet Take 300 mg by mouth every morning       OXcarbazepine (TRILEPTAL) 600 MG tablet Take 600 mg by mouth At Bedtime       senna-docusate (SENOKOT-S/PERICOLACE) 8.6-50 MG tablet Take 1 tablet by mouth 2 times daily 120 tablet 5     sildenafil (REVATIO) 20 MG tablet Take 3-5 (60-100mg) tablets by mouth 1 hour prior to sexual activity 30 tablet 11     zolpidem (AMBIEN) 10 MG tablet Take 10 mg by mouth At Bedtime         No Known Allergies    ROS:  Pertinent positives and negatives are noted in HPI.    EXAM:  /74 (BP Location: Right arm, Patient Position: Left side, Cuff Size: Adult Large)   Pulse 80    Temp 98.6  F (37  C) (Temporal)   Resp 18   Wt 114.8 kg (253 lb)   SpO2 96%   BMI 38.47 kg/m    General appearance: well appearing overweight male, in no acute distress  Musculoskeletal: Right great toe is swollen and erythematous, very painful at the base  Dermatological: no rashes or lesions  Psychological: normal affect, alert and pleasant  Results for orders placed or performed during the hospital encounter of 07/02/20   XR Toe Right G/E 2 Views     Status: None    Narrative    PROCEDURE: XR TOE RIGHT G/E 2 VIEWS 7/2/2020 3:02 PM    HISTORY: Pain and swelling of toe of right foot; Pain and swelling of  toe of right foot    COMPARISONS: None.    TECHNIQUE: 2 views.    FINDINGS: There is an obliquely oriented fracture through the distal  half of the proximal phalanx of the great toe. This is a subacute  fracture with periosteal new bone formation seen both dorsally and  laterally.    Mild degenerative changes seen in the interphalangeal joints.         Impression    IMPRESSION: Healing proximal phalanx fracture.    MITCHELL RODRIGUEZ MD   Results for orders placed or performed in visit on 07/02/20   Uric acid     Status: None   Result Value Ref Range    Uric Acid 6.0 4.4 - 7.6 mg/dL   CBC and Differential     Status: None   Result Value Ref Range    WBC 5.9 4.0 - 11.0 10e9/L    RBC Count 4.86 4.4 - 5.9 10e12/L    Hemoglobin 14.4 13.3 - 17.7 g/dL    Hematocrit 42.0 40.0 - 53.0 %    MCV 86 78 - 100 fl    MCH 29.6 26.5 - 33.0 pg    MCHC 34.3 31.5 - 36.5 g/dL    RDW 11.8 10.0 - 15.0 %    Platelet Count 230 150 - 450 10e9/L    Diff Method Automated Method     % Neutrophils 63.8 %    % Lymphocytes 23.9 %    % Monocytes 8.7 %    % Eosinophils 2.0 %    % Basophils 0.7 %    % Immature Granulocytes 0.9 %    Absolute Neutrophil 3.7 1.6 - 8.3 10e9/L    Absolute Lymphocytes 1.4 0.8 - 5.3 10e9/L    Absolute Monocytes 0.5 0.0 - 1.3 10e9/L    Absolute Eosinophils 0.1 0.0 - 0.7 10e9/L    Absolute Basophils 0.0 0.0 - 0.2  10e9/L    Abs Immature Granulocytes 0.1 0 - 0.4 10e9/L       ASSESSMENT AND PLAN:    1. Closed nondisplaced fracture of proximal phalanx of left great toe, initial encounter    2. Pain and swelling of toe of right foot      Despite injury I did obtain uric acid and CBC due to presentation and risk factors.  These were negative.  X-ray shows fracture proximal right greater toe that is healing.  Placed him in surgical shoe after sari taping greater and second toe.  Discussed ongoing symptomatic management including NSAIDs, ice, elevation.  Follow-up in 2 weeks to monitor healing, sooner if needed.      Flor Toro, CORBIN CNP..................7/2/2020 2:37 PM      This document was prepared using voice generated software.  While every attempt was made for accuracy, grammatical errors may exist.

## 2020-07-02 NOTE — NURSING NOTE
"Chief Complaint   Patient presents with     Toe Injury     Right greater toe swollen       Patient presents today in clinic for the following twisted right greater toe 2 weeks ago, still swollen and painful.    Initial /74 (BP Location: Right arm, Patient Position: Left side, Cuff Size: Adult Large)   Pulse 80   Temp 98.6  F (37  C) (Temporal)   Resp 18   Wt 114.8 kg (253 lb)   SpO2 96%   BMI 38.47 kg/m   Estimated body mass index is 38.47 kg/m  as calculated from the following:    Height as of 3/11/20: 1.727 m (5' 8\").    Weight as of this encounter: 114.8 kg (253 lb).  Medication Reconciliation: complete    ALFREDO, FLINCK, LPN  "

## 2020-07-02 NOTE — PATIENT INSTRUCTIONS
Surgical shoe for all weight bearing  Ice to toe often  Elevation  Follow up in 2 weeks for repeat xray

## 2020-07-16 ENCOUNTER — OFFICE VISIT (OUTPATIENT)
Dept: FAMILY MEDICINE | Facility: OTHER | Age: 45
End: 2020-07-16
Attending: FAMILY MEDICINE
Payer: COMMERCIAL

## 2020-07-16 VITALS
WEIGHT: 255.2 LBS | TEMPERATURE: 97.5 F | BODY MASS INDEX: 38.8 KG/M2 | RESPIRATION RATE: 16 BRPM | SYSTOLIC BLOOD PRESSURE: 110 MMHG | DIASTOLIC BLOOD PRESSURE: 68 MMHG | HEART RATE: 64 BPM

## 2020-07-16 DIAGNOSIS — S92.421D CLOSED DISPLACED FRACTURE OF DISTAL PHALANX OF RIGHT GREAT TOE WITH ROUTINE HEALING, SUBSEQUENT ENCOUNTER: Primary | ICD-10-CM

## 2020-07-16 PROCEDURE — 99213 OFFICE O/P EST LOW 20 MIN: CPT | Performed by: FAMILY MEDICINE

## 2020-07-16 PROCEDURE — G0463 HOSPITAL OUTPT CLINIC VISIT: HCPCS

## 2020-07-16 ASSESSMENT — PAIN SCALES - GENERAL: PAINLEVEL: MILD PAIN (2)

## 2020-07-16 NOTE — PROGRESS NOTES
SUBJECTIVE:   Tenzin Ca is a 44 year old male who presents to clinic today for the following health issues: Follow-up toe fracture    Patient arrives here for follow-up toe fracture.  4 weeks ago he has twisted his toes sustaining a comminuted fracture of the distal phalanx.  He was seen couple weeks ago and x-rays at that time showed healing.  Reports no pain.  He states that he is walking now on a regular basis        Patient Active Problem List    Diagnosis Date Noted     Vitamin D deficiency 12/26/2018     Priority: Medium     Slow transit constipation 02/22/2018     Priority: Medium     Hypertriglyceridemia 02/20/2018     Priority: Medium     Major depressive disorder, recurrent episode (H) 02/20/2018     Priority: Medium     Psoriasis 02/20/2018     Priority: Medium     Schizophrenia (H) 02/20/2018     Priority: Medium     Overview:   hears voices       Torn medial meniscus 02/20/2018     Priority: Medium     Obesity 02/21/2017     Priority: Medium     S/P left knee arthroscopy 07/05/2016     Priority: Medium     Psychosis (H) 07/02/2015     Priority: Medium     Erectile dysfunction 04/23/2014     Priority: Medium     Delayed ejaculation 06/03/2013     Priority: Medium     Past Medical History:   Diagnosis Date     Mental disorder     Disability related to mental illness     Personal history of other mental and behavioral disorders     2016      Past Surgical History:   Procedure Laterality Date     ARTHROSCOPY KNEE      Left Knee Arthroscopy and Partial Meniscectomy and Chondroplasty       Review of Systems     OBJECTIVE:     /68   Pulse 64   Temp 97.5  F (36.4  C)   Resp 16   Wt 115.8 kg (255 lb 3.2 oz)   BMI 38.80 kg/m    Body mass index is 38.8 kg/m .  Physical Exam  Constitutional:       Appearance: Normal appearance.   Musculoskeletal:      Comments: Distal right toes slightly swollen.  But there is no pain on palpation.   Neurological:      Mental Status: He is alert.         Diagnostic  Test Results:  none     ASSESSMENT/PLAN:         1. Closed displaced fracture of distal phalanx of right great toe with routine healing, subsequent encounter  Healing appropriate.        Ervin Hassan MD  [facility] AND [facility]

## 2020-07-16 NOTE — NURSING NOTE
Patient here for follow up on right great toe. Medication Reconciliation: complete.    Jaquelin Aguilera LPN  7/16/2020 1:10 PM

## 2020-07-23 ENCOUNTER — NURSE TRIAGE (OUTPATIENT)
Dept: FAMILY MEDICINE | Facility: OTHER | Age: 45
End: 2020-07-23

## 2020-07-23 ENCOUNTER — TELEPHONE (OUTPATIENT)
Dept: FAMILY MEDICINE | Facility: OTHER | Age: 45
End: 2020-07-23

## 2020-07-23 NOTE — TELEPHONE ENCOUNTER
Patient is staying at [facility] and claims he has not had a bowel movement in 1 week.  Silke, nurse at [facility], would like an order for a laxative.  Can call her at [phone] or fax over the order to [phone]  Magalie Garcia on 7/23/2020 at 4:31 PM

## 2020-07-23 NOTE — TELEPHONE ENCOUNTER
Additional Information    Constipation persists > 1 week and no improvement after using CARE ADVICE    Protocols used: CONSTIPATION-A-OH

## 2020-07-24 NOTE — TELEPHONE ENCOUNTER
Patient returned call and does not need order House RN said he is feeling much better    Thank You   Kacy Gann on 7/24/2020 at 12:15 PM

## 2020-07-24 NOTE — TELEPHONE ENCOUNTER
Left message to call back....................  7/24/2020   10:38 AM  Vianney Blevins LPN on 7/24/2020 at 10:38 AM

## 2020-07-29 NOTE — TELEPHONE ENCOUNTER
See telephone encounter, dated 7/23/20. Closing encounter at this time. Christine Palomino RN .............. 7/29/2020  2:39 PM

## 2020-09-03 ENCOUNTER — ALLIED HEALTH/NURSE VISIT (OUTPATIENT)
Dept: FAMILY MEDICINE | Facility: OTHER | Age: 45
End: 2020-09-03
Attending: FAMILY MEDICINE
Payer: COMMERCIAL

## 2020-09-03 DIAGNOSIS — J02.0 STREPTOCOCCAL SORE THROAT: Primary | ICD-10-CM

## 2020-09-03 PROCEDURE — U0003 INFECTIOUS AGENT DETECTION BY NUCLEIC ACID (DNA OR RNA); SEVERE ACUTE RESPIRATORY SYNDROME CORONAVIRUS 2 (SARS-COV-2) (CORONAVIRUS DISEASE [COVID-19]), AMPLIFIED PROBE TECHNIQUE, MAKING USE OF HIGH THROUGHPUT TECHNOLOGIES AS DESCRIBED BY CMS-2020-01-R: HCPCS | Mod: ZL | Performed by: FAMILY MEDICINE

## 2020-09-03 PROCEDURE — 99207 ZZC NO CHARGE NURSE ONLY: CPT

## 2020-09-03 PROCEDURE — C9803 HOPD COVID-19 SPEC COLLECT: HCPCS

## 2020-09-04 LAB
SARS-COV-2 RNA SPEC QL NAA+PROBE: NOT DETECTED
SPECIMEN SOURCE: NORMAL

## 2020-09-24 DIAGNOSIS — F20.0 PARANOID SCHIZOPHRENIA, SUBCHRONIC CONDITION (H): ICD-10-CM

## 2020-09-24 DIAGNOSIS — F41.0 PANIC DISORDER WITHOUT AGORAPHOBIA: ICD-10-CM

## 2020-09-24 DIAGNOSIS — Z79.899 ENCOUNTER FOR LONG-TERM (CURRENT) USE OF OTHER MEDICATIONS: Primary | ICD-10-CM

## 2020-09-24 DIAGNOSIS — F70 MILD INTELLECTUAL DISABILITIES: ICD-10-CM

## 2020-10-20 DIAGNOSIS — F41.0 PANIC DISORDER WITHOUT AGORAPHOBIA: ICD-10-CM

## 2020-10-20 DIAGNOSIS — F20.0 PARANOID SCHIZOPHRENIA (H): ICD-10-CM

## 2020-10-20 DIAGNOSIS — F70 MILD INTELLECTUAL DISABILITIES: ICD-10-CM

## 2020-10-20 DIAGNOSIS — Z79.899 ENCOUNTER FOR LONG-TERM (CURRENT) USE OF MEDICATIONS: Primary | ICD-10-CM

## 2020-10-21 DIAGNOSIS — F41.0 PANIC DISORDER WITHOUT AGORAPHOBIA: ICD-10-CM

## 2020-10-21 DIAGNOSIS — F20.0 PARANOID SCHIZOPHRENIA (H): ICD-10-CM

## 2020-10-21 DIAGNOSIS — F70 MILD INTELLECTUAL DISABILITIES: ICD-10-CM

## 2020-10-21 DIAGNOSIS — Z79.899 ENCOUNTER FOR LONG-TERM (CURRENT) USE OF MEDICATIONS: ICD-10-CM

## 2020-10-21 LAB
ALBUMIN SERPL-MCNC: 4.1 G/DL (ref 3.5–5.7)
ALP SERPL-CCNC: 72 U/L (ref 34–104)
ALT SERPL W P-5'-P-CCNC: 26 U/L (ref 7–52)
ANION GAP SERPL CALCULATED.3IONS-SCNC: 7 MMOL/L (ref 3–14)
AST SERPL W P-5'-P-CCNC: 18 U/L (ref 13–39)
BASOPHILS # BLD AUTO: 0 10E9/L (ref 0–0.2)
BASOPHILS NFR BLD AUTO: 0.6 %
BILIRUB SERPL-MCNC: 0.4 MG/DL (ref 0.3–1)
BUN SERPL-MCNC: 10 MG/DL (ref 7–25)
CALCIUM SERPL-MCNC: 9.1 MG/DL (ref 8.6–10.3)
CHLORIDE SERPL-SCNC: 104 MMOL/L (ref 98–107)
CHOLEST SERPL-MCNC: 164 MG/DL
CO2 SERPL-SCNC: 27 MMOL/L (ref 21–31)
CREAT SERPL-MCNC: 1.03 MG/DL (ref 0.7–1.3)
DEPRECATED CALCIDIOL+CALCIFEROL SERPL-MC: 25.7 NG/ML
DIFFERENTIAL METHOD BLD: NORMAL
EOSINOPHIL # BLD AUTO: 0.2 10E9/L (ref 0–0.7)
EOSINOPHIL NFR BLD AUTO: 3.7 %
ERYTHROCYTE [DISTWIDTH] IN BLOOD BY AUTOMATED COUNT: 12.4 % (ref 10–15)
GFR SERPL CREATININE-BSD FRML MDRD: 78 ML/MIN/{1.73_M2}
GLUCOSE SERPL-MCNC: 129 MG/DL (ref 70–105)
HBA1C MFR BLD: 5.7 % (ref 4–6)
HCT VFR BLD AUTO: 42 % (ref 40–53)
HDLC SERPL-MCNC: 27 MG/DL (ref 23–92)
HGB BLD-MCNC: 14.5 G/DL (ref 13.3–17.7)
IMM GRANULOCYTES # BLD: 0.1 10E9/L (ref 0–0.4)
IMM GRANULOCYTES NFR BLD: 0.9 %
IRON SERPL-MCNC: 78 UG/DL (ref 50–212)
LDLC SERPL CALC-MCNC: 86 MG/DL
LYMPHOCYTES # BLD AUTO: 1.4 10E9/L (ref 0.8–5.3)
LYMPHOCYTES NFR BLD AUTO: 25.5 %
MAGNESIUM SERPL-MCNC: 2 MG/DL (ref 1.9–2.7)
MCH RBC QN AUTO: 30 PG (ref 26.5–33)
MCHC RBC AUTO-ENTMCNC: 34.5 G/DL (ref 31.5–36.5)
MCV RBC AUTO: 87 FL (ref 78–100)
MONOCYTES # BLD AUTO: 0.4 10E9/L (ref 0–1.3)
MONOCYTES NFR BLD AUTO: 7.4 %
NEUTROPHILS # BLD AUTO: 3.3 10E9/L (ref 1.6–8.3)
NEUTROPHILS NFR BLD AUTO: 61.9 %
NONHDLC SERPL-MCNC: 137 MG/DL
PLATELET # BLD AUTO: 180 10E9/L (ref 150–450)
POTASSIUM SERPL-SCNC: 3.8 MMOL/L (ref 3.5–5.1)
PROLACTIN SERPL-MCNC: 3.85 NG/ML
PROT SERPL-MCNC: 6.9 G/DL (ref 6.4–8.9)
RBC # BLD AUTO: 4.84 10E12/L (ref 4.4–5.9)
SODIUM SERPL-SCNC: 138 MMOL/L (ref 134–144)
T3FREE SERPL-MCNC: 3.9 PG/ML (ref 2.5–3.9)
T4 FREE SERPL-MCNC: 0.66 NG/DL (ref 0.6–1.6)
TRIGL SERPL-MCNC: 256 MG/DL
TSH SERPL DL<=0.05 MIU/L-ACNC: 4.2 IU/ML (ref 0.34–5.6)
VIT B12 SERPL-MCNC: 197 PG/ML (ref 180–914)
WBC # BLD AUTO: 5.4 10E9/L (ref 4–11)

## 2020-10-21 PROCEDURE — 84481 FREE ASSAY (FT-3): CPT | Mod: ZL | Performed by: NURSE PRACTITIONER

## 2020-10-21 PROCEDURE — 83735 ASSAY OF MAGNESIUM: CPT | Mod: ZL | Performed by: NURSE PRACTITIONER

## 2020-10-21 PROCEDURE — 80053 COMPREHEN METABOLIC PANEL: CPT | Mod: ZL | Performed by: NURSE PRACTITIONER

## 2020-10-21 PROCEDURE — 85025 COMPLETE CBC W/AUTO DIFF WBC: CPT | Mod: ZL | Performed by: NURSE PRACTITIONER

## 2020-10-21 PROCEDURE — 84439 ASSAY OF FREE THYROXINE: CPT | Mod: ZL | Performed by: NURSE PRACTITIONER

## 2020-10-21 PROCEDURE — 82607 VITAMIN B-12: CPT | Mod: ZL | Performed by: NURSE PRACTITIONER

## 2020-10-21 PROCEDURE — 80061 LIPID PANEL: CPT | Mod: ZL | Performed by: NURSE PRACTITIONER

## 2020-10-21 PROCEDURE — 84443 ASSAY THYROID STIM HORMONE: CPT | Mod: ZL | Performed by: NURSE PRACTITIONER

## 2020-10-21 PROCEDURE — 83036 HEMOGLOBIN GLYCOSYLATED A1C: CPT | Mod: ZL | Performed by: NURSE PRACTITIONER

## 2020-10-21 PROCEDURE — 83540 ASSAY OF IRON: CPT | Mod: ZL | Performed by: NURSE PRACTITIONER

## 2020-10-21 PROCEDURE — 36415 COLL VENOUS BLD VENIPUNCTURE: CPT | Mod: ZL | Performed by: NURSE PRACTITIONER

## 2020-10-21 PROCEDURE — 82306 VITAMIN D 25 HYDROXY: CPT | Mod: ZL | Performed by: NURSE PRACTITIONER

## 2020-10-21 PROCEDURE — 84146 ASSAY OF PROLACTIN: CPT | Mod: ZL | Performed by: NURSE PRACTITIONER

## 2020-10-21 PROCEDURE — 80183 DRUG SCRN QUANT OXCARBAZEPIN: CPT | Mod: ZL | Performed by: NURSE PRACTITIONER

## 2020-10-22 LAB — 10OH-CARBAZEPINE SERPL-MCNC: 16 UG/ML (ref 10–35)

## 2020-11-04 ENCOUNTER — OFFICE VISIT (OUTPATIENT)
Dept: FAMILY MEDICINE | Facility: OTHER | Age: 45
End: 2020-11-04
Attending: FAMILY MEDICINE
Payer: COMMERCIAL

## 2020-11-04 VITALS
SYSTOLIC BLOOD PRESSURE: 102 MMHG | OXYGEN SATURATION: 97 % | BODY MASS INDEX: 40.48 KG/M2 | HEART RATE: 66 BPM | WEIGHT: 266.2 LBS | DIASTOLIC BLOOD PRESSURE: 68 MMHG | RESPIRATION RATE: 20 BRPM | TEMPERATURE: 98.8 F

## 2020-11-04 DIAGNOSIS — B97.89 SORE THROAT (VIRAL): ICD-10-CM

## 2020-11-04 DIAGNOSIS — J02.8 SORE THROAT (VIRAL): ICD-10-CM

## 2020-11-04 DIAGNOSIS — J02.0 STREPTOCOCCAL SORE THROAT: Primary | ICD-10-CM

## 2020-11-04 LAB
SPECIMEN SOURCE: NORMAL
STREP GROUP A PCR: NOT DETECTED

## 2020-11-04 PROCEDURE — C9803 HOPD COVID-19 SPEC COLLECT: HCPCS

## 2020-11-04 PROCEDURE — 87651 STREP A DNA AMP PROBE: CPT | Mod: ZL | Performed by: FAMILY MEDICINE

## 2020-11-04 PROCEDURE — G0463 HOSPITAL OUTPT CLINIC VISIT: HCPCS

## 2020-11-04 PROCEDURE — 99213 OFFICE O/P EST LOW 20 MIN: CPT | Performed by: FAMILY MEDICINE

## 2020-11-04 RX ORDER — PRAZOSIN HYDROCHLORIDE 1 MG/1
1 CAPSULE ORAL
COMMUNITY
Start: 2020-10-07

## 2020-11-04 RX ORDER — HALOPERIDOL 0.5 MG/1
1 TABLET ORAL 3 TIMES DAILY
COMMUNITY
Start: 2020-10-21 | End: 2021-09-16

## 2020-11-04 RX ORDER — NAPROXEN 500 MG/1
500 TABLET ORAL 2 TIMES DAILY WITH MEALS
Qty: 10 TABLET | Refills: 0 | Status: SHIPPED | OUTPATIENT
Start: 2020-11-04 | End: 2021-05-12

## 2020-11-04 ASSESSMENT — PAIN SCALES - GENERAL: PAINLEVEL: EXTREME PAIN (8)

## 2020-11-04 NOTE — PROGRESS NOTES
SUBJECTIVE:   Tenzin Ca is a 45 year old male who presents to clinic today for the following health issues: Sore throat    Patient arrives here for sore throat.  Is been going on for 2 days.  No exposures that he is aware of.  He is not taking ibuprofen.  States he does not have any money to buy.  No fevers chills.        Patient Active Problem List    Diagnosis Date Noted     Vitamin D deficiency 12/26/2018     Priority: Medium     Slow transit constipation 02/22/2018     Priority: Medium     Hypertriglyceridemia 02/20/2018     Priority: Medium     Major depressive disorder, recurrent episode (H) 02/20/2018     Priority: Medium     Psoriasis 02/20/2018     Priority: Medium     Schizophrenia (H) 02/20/2018     Priority: Medium     Overview:   hears voices       Torn medial meniscus 02/20/2018     Priority: Medium     Obesity 02/21/2017     Priority: Medium     S/P left knee arthroscopy 07/05/2016     Priority: Medium     Psychosis (H) 07/02/2015     Priority: Medium     Erectile dysfunction 04/23/2014     Priority: Medium     Delayed ejaculation 06/03/2013     Priority: Medium     Past Medical History:   Diagnosis Date     Mental disorder     Disability related to mental illness     Personal history of other mental and behavioral disorders     2016        Review of Systems     OBJECTIVE:     /68   Pulse 66   Temp 98.8  F (37.1  C)   Resp 20   Wt 120.7 kg (266 lb 3.2 oz)   SpO2 97%   BMI 40.48 kg/m    Body mass index is 40.48 kg/m .  Physical Exam  HENT:      Mouth/Throat:      Pharynx: Posterior oropharyngeal erythema present. No oropharyngeal exudate.   Lymphadenopathy:      Cervical: No cervical adenopathy.   Neurological:      Mental Status: He is alert.         Diagnostic Test Results:  Results for orders placed or performed in visit on 11/04/20 (from the past 24 hour(s))   Group A Streptococcus PCR Throat Swab    Specimen: Throat   Result Value Ref Range    Specimen Description Throat      Strep Group A PCR Not Detected NDET^Not Detected       ASSESSMENT/PLAN:         1. Streptococcal sore throat    - Group A Streptococcus PCR Throat Swab  - Symptomatic COVID-19 Virus (Coronavirus) by PCR; Future    2. Sore throat (viral)  Strep is negative.  Patient  would like a Covid test.  Also requesting a letter.  - naproxen (NAPROSYN) 500 MG tablet; Take 1 tablet (500 mg) by mouth 2 times daily (with meals)  Dispense: 10 tablet; Refill: 0      Ervin Hassan MD  [facility]

## 2020-11-04 NOTE — NURSING NOTE
Patient here for sore throat that started yesterday. He says it just hurts to swallow. Medication Reconciliation: complete.    Jaquelin Aguilera LPN  11/4/2020 2:06 PM

## 2020-11-04 NOTE — LETTER
[facility]  [address]  [place] [facility] [address]  Phone: [phone]  Fax: [phone]    November 4, 2020        Tenzin Ca  [address]   [facility] [place] [address]          To whom it may concern:    RE: Tenzin Ca    Patient was seen and treated today at our clinic for sore throat.    Please contact me for questions or concerns.      Sincerely,        Ervin Hassan MD

## 2020-11-10 ENCOUNTER — TELEPHONE (OUTPATIENT)
Dept: FAMILY MEDICINE | Facility: OTHER | Age: 45
End: 2020-11-10

## 2020-11-10 ENCOUNTER — OFFICE VISIT (OUTPATIENT)
Dept: FAMILY MEDICINE | Facility: OTHER | Age: 45
End: 2020-11-10
Attending: FAMILY MEDICINE
Payer: COMMERCIAL

## 2020-11-10 VITALS
RESPIRATION RATE: 21 BRPM | BODY MASS INDEX: 39.83 KG/M2 | HEART RATE: 65 BPM | SYSTOLIC BLOOD PRESSURE: 120 MMHG | DIASTOLIC BLOOD PRESSURE: 86 MMHG | HEIGHT: 68 IN | WEIGHT: 262.8 LBS | OXYGEN SATURATION: 98 % | TEMPERATURE: 97.8 F

## 2020-11-10 DIAGNOSIS — M54.2 NECK PAIN: ICD-10-CM

## 2020-11-10 DIAGNOSIS — R60.0 LOCALIZED EDEMA: ICD-10-CM

## 2020-11-10 DIAGNOSIS — B97.89 SORE THROAT (VIRAL): Primary | ICD-10-CM

## 2020-11-10 DIAGNOSIS — J02.8 SORE THROAT (VIRAL): Primary | ICD-10-CM

## 2020-11-10 LAB
HETEROPH AB SER QL: NEGATIVE
T3FREE SERPL-MCNC: 3.7 PG/ML (ref 2.5–3.9)
T4 FREE SERPL-MCNC: 0.67 NG/DL (ref 0.6–1.6)
TSH SERPL DL<=0.05 MIU/L-ACNC: 2.66 IU/ML (ref 0.34–5.6)

## 2020-11-10 PROCEDURE — 86308 HETEROPHILE ANTIBODY SCREEN: CPT | Mod: ZL | Performed by: FAMILY MEDICINE

## 2020-11-10 PROCEDURE — 36415 COLL VENOUS BLD VENIPUNCTURE: CPT | Mod: ZL | Performed by: FAMILY MEDICINE

## 2020-11-10 PROCEDURE — 84439 ASSAY OF FREE THYROXINE: CPT | Mod: ZL | Performed by: FAMILY MEDICINE

## 2020-11-10 PROCEDURE — G0463 HOSPITAL OUTPT CLINIC VISIT: HCPCS

## 2020-11-10 PROCEDURE — 84443 ASSAY THYROID STIM HORMONE: CPT | Mod: ZL | Performed by: FAMILY MEDICINE

## 2020-11-10 PROCEDURE — 99213 OFFICE O/P EST LOW 20 MIN: CPT | Performed by: FAMILY MEDICINE

## 2020-11-10 PROCEDURE — 84481 FREE ASSAY (FT-3): CPT | Mod: ZL | Performed by: FAMILY MEDICINE

## 2020-11-10 RX ORDER — AMOXICILLIN 500 MG/1
500 CAPSULE ORAL 2 TIMES DAILY
Qty: 20 CAPSULE | Refills: 0 | Status: SHIPPED | OUTPATIENT
Start: 2020-11-10 | End: 2020-11-20

## 2020-11-10 ASSESSMENT — MIFFLIN-ST. JEOR: SCORE: 2056.55

## 2020-11-10 ASSESSMENT — PAIN SCALES - GENERAL: PAINLEVEL: WORST PAIN (10)

## 2020-11-10 NOTE — NURSING NOTE
"Chief Complaint   Patient presents with     Pharyngitis     Patient presents to clinic with sore throat. He was tested for strep and covid on 11/4, both coming back negative. He states the pain is at a 10/10 right now.     Initial /86 (BP Location: Right arm, Patient Position: Sitting, Cuff Size: Adult Regular)   Pulse 65   Temp 97.8  F (36.6  C) (Tympanic)   Resp 21   Ht 1.727 m (5' 8\")   Wt 119.2 kg (262 lb 12.8 oz)   SpO2 98%   BMI 39.96 kg/m   Estimated body mass index is 39.96 kg/m  as calculated from the following:    Height as of this encounter: 1.727 m (5' 8\").    Weight as of this encounter: 119.2 kg (262 lb 12.8 oz).    Medication Reconciliation: complete      Ying Epperson  "

## 2020-11-10 NOTE — TELEPHONE ENCOUNTER
Patient is getting worse from when he was seen last week by . His throat is sore and he can hardly swallow also a few other symptoms are getting worse

## 2020-11-10 NOTE — TELEPHONE ENCOUNTER
Spoke with patient his throat is not better we will see him back in clinic this afternoon. Jaquelin Aguilera LPN .......................11/10/2020  1:54 PM

## 2020-11-11 ASSESSMENT — ENCOUNTER SYMPTOMS
EYES NEGATIVE: 1
RESPIRATORY NEGATIVE: 1
SORE THROAT: 1
CHILLS: 0
PSYCHIATRIC NEGATIVE: 1
DIZZINESS: 0
FEVER: 0

## 2020-11-11 NOTE — PROGRESS NOTES
"  SUBJECTIVE:   Tenzin Ca is a 45 year old male who presents to clinic today for the following health issues: \"My throat hurts really bad\"    Patient arrives here because of his throat pain.  He has not noticed any improvement with the etodolac.  Continues to GERD.  He points right to his thyroid where it hurts.  Recent strep was negative.        Patient Active Problem List    Diagnosis Date Noted     Vitamin D deficiency 12/26/2018     Priority: Medium     Slow transit constipation 02/22/2018     Priority: Medium     Hypertriglyceridemia 02/20/2018     Priority: Medium     Major depressive disorder, recurrent episode (H) 02/20/2018     Priority: Medium     Psoriasis 02/20/2018     Priority: Medium     Schizophrenia (H) 02/20/2018     Priority: Medium     Overview:   hears voices       Torn medial meniscus 02/20/2018     Priority: Medium     Obesity 02/21/2017     Priority: Medium     S/P left knee arthroscopy 07/05/2016     Priority: Medium     Psychosis (H) 07/02/2015     Priority: Medium     Erectile dysfunction 04/23/2014     Priority: Medium     Delayed ejaculation 06/03/2013     Priority: Medium     Past Medical History:   Diagnosis Date     Mental disorder     Disability related to mental illness     Personal history of other mental and behavioral disorders     2016      Past Surgical History:   Procedure Laterality Date     ARTHROSCOPY KNEE      Left Knee Arthroscopy and Partial Meniscectomy and Chondroplasty     No Known Allergies    Review of Systems   Constitutional: Negative for chills and fever.   HENT: Positive for sore throat. Negative for congestion.    Eyes: Negative.    Respiratory: Negative.    Cardiovascular: Negative for chest pain.   Genitourinary: Negative.    Neurological: Negative for dizziness.   Psychiatric/Behavioral: Negative.         OBJECTIVE:     /86 (BP Location: Right arm, Patient Position: Sitting, Cuff Size: Adult Regular)   Pulse 65   Temp 97.8  F (36.6  C) (Tympanic) " "  Resp 21   Ht 1.727 m (5' 8\")   Wt 119.2 kg (262 lb 12.8 oz)   SpO2 98%   BMI 39.96 kg/m    Body mass index is 39.96 kg/m .  Physical Exam  HENT:      Mouth/Throat:      Mouth: Mucous membranes are dry.      Pharynx: Posterior oropharyngeal erythema present. No oropharyngeal exudate.   Eyes:      Pupils: Pupils are equal, round, and reactive to light.   Neck:      Comments: Thyroid very tender to palpate.  Seems to have somewhat of an exaggerated response  Pulmonary:      Effort: Pulmonary effort is normal.      Breath sounds: Normal breath sounds.   Neurological:      Mental Status: He is alert.         Diagnostic Test Results:  Results for orders placed or performed in visit on 11/10/20   T4, Free     Status: None   Result Value Ref Range    T4 Free 0.67 0.60 - 1.60 ng/dL   T3, Free     Status: None   Result Value Ref Range    Free T3 3.7 2.5 - 3.9 pg/mL   Mononucleosis screen (Heterophile)     Status: None   Result Value Ref Range    Mononucleosis Screen Negative NEG^Negative   Thyrotropin GH     Status: None   Result Value Ref Range    Thyrotropin 2.66 0.34 - 5.60 IU/mL       ASSESSMENT/PLAN:         1. Sore throat (viral)  Because of the length of time we will start amoxicillin  - amoxicillin (AMOXIL) 500 MG capsule; Take 1 capsule (500 mg) by mouth 2 times daily for 10 days  Dispense: 20 capsule; Refill: 0  - Mononucleosis screen (Heterophile); Future  - acetaminophen-codeine (TYLENOL #3) 300-30 MG tablet; Take 1 tablet by mouth every 6 hours as needed for severe pain  Dispense: 10 tablet; Refill: 0  - Mononucleosis screen (Heterophile)  - Thyrotropin GH    2. Neck pain  Rule out thyroiditis labs were negative.  - TSH Reflex GH; Future  - T3, Free; Future  - T4, Free; Future  - T4, Free  - T3, Free    3. Localized edema   Rule out thyroiditis labs negative.  - TSH Reflex GH; Future  - T3, Free; Future  - T4, Free; Future  - T4, Free  - T3, Free      Ervin Hassan MD  [facility] AND " [facility]

## 2020-12-27 ENCOUNTER — HEALTH MAINTENANCE LETTER (OUTPATIENT)
Age: 45
End: 2020-12-27

## 2021-01-19 ENCOUNTER — TRANSFERRED RECORDS (OUTPATIENT)
Dept: HEALTH INFORMATION MANAGEMENT | Facility: OTHER | Age: 46
End: 2021-01-19

## 2021-03-10 ENCOUNTER — OFFICE VISIT (OUTPATIENT)
Dept: UROLOGY | Facility: OTHER | Age: 46
End: 2021-03-10
Attending: UROLOGY
Payer: COMMERCIAL

## 2021-03-10 VITALS
HEART RATE: 64 BPM | SYSTOLIC BLOOD PRESSURE: 114 MMHG | BODY MASS INDEX: 40.96 KG/M2 | DIASTOLIC BLOOD PRESSURE: 78 MMHG | RESPIRATION RATE: 12 BRPM | WEIGHT: 269.4 LBS

## 2021-03-10 DIAGNOSIS — N52.9 ERECTILE DYSFUNCTION, UNSPECIFIED ERECTILE DYSFUNCTION TYPE: ICD-10-CM

## 2021-03-10 DIAGNOSIS — N52.9 ED (ERECTILE DYSFUNCTION) OF ORGANIC ORIGIN: Primary | ICD-10-CM

## 2021-03-10 PROCEDURE — 99214 OFFICE O/P EST MOD 30 MIN: CPT | Performed by: UROLOGY

## 2021-03-10 PROCEDURE — G0463 HOSPITAL OUTPT CLINIC VISIT: HCPCS

## 2021-03-10 RX ORDER — SILDENAFIL CITRATE 20 MG/1
TABLET ORAL
Qty: 30 TABLET | Refills: 11 | Status: SHIPPED | OUTPATIENT
Start: 2021-03-10 | End: 2021-05-12

## 2021-03-10 ASSESSMENT — PAIN SCALES - GENERAL: PAINLEVEL: NO PAIN (0)

## 2021-03-10 NOTE — PROGRESS NOTES
Type of Visit  EST    Chief Complaint  Erectile dysfunction    HPI  Mr. Ca is a 45 year old male with history of schizophrenia who follows up with erectile dysfunction.  His ED issues started about 6 years ago.  Currently he is using sildenafil.  He rates his erectile rigidity as 5/10 with treatment.  He initially was experiencing significant improvement with 40 mg to 60 mg dose.  Today he reports diminished benefit with that dose range but has not taken 4 or 5 (80-100mg)  tablets at a time.  He reports taking the previous PDE5 inhibitors correctly  He does not take oral nitrates for chest pain.    He also has numerous questions regarding genital physiology, erectile dysfunction, ejaculatory dysfunction, vasectomy.      Review of Systems  I personally reviewed the ROS with the patient.    Nursing Notes:   Yovana Quispe RN  3/10/2021 10:43 AM  Signed  Review of Systems:    Weight loss:    No     Recent fever/chills:  No   Night sweats:   No  Current skin rash:  No   Recent hair loss:  No  Heat intolerance:  No   Cold intolerance:  No  Chest pain:   No   Palpitations:   No  Shortness of breath:  No   Wheezing:   No  Constipation:    No   Diarrhea:   No   Nausea:   No   Vomiting:   No   Kidney/side pain:  No   Back pain:   No  Frequent headaches:  No   Dizziness:     No  Leg swelling:   No   Calf pain:    No      Physical Exam  Vitals:    03/10/21 1038   BP: 114/78   BP Location: Left arm   Patient Position: Sitting   Cuff Size: Adult Large   Pulse: 64   Resp: 12   Weight: 122.2 kg (269 lb 6.4 oz)     Constitutional: No acute distress.  Alert and cooperative   Head: NCAT  Eyes: Conjunctivae normal  Cardiovascular: Regular rate  Pulmonary/Chest: Respirations are even and non-labored bilaterally, no audible wheezing  Abdominal: Soft. No distension, tenderness, masses or guarding.   Neurological: A + O x 3.  Cranial Nerves II-XII grossly intact.  Extremities: STEVO x 4, Warm. No clubbing.  No cyanosis.    Skin:  Pink, warm and dry.  No visible rashes noted.  Psychiatric:  Normal mood and affect  Back:  No left CVA tenderness.  No right CVA tenderness.  Genitourinary:  Nonpalpable bladder    Assessment  Mr. Ca is a 44 year old male with erectile dysfunction who follows up with questions today regarding ED.    We discussed all of his questions and answered to his satisfaction.    Plan  Continue sildenafil but attempt higher dose (was using 40-60mg) 60-100mg by mouth 1 hour prior to sexual activity (empty stomach).  Follow-up in 1 year if refills are needed            A total of 30 minutes was spent with the patient, reviewing records, tests, ordering medications, tests or procedures, counseling regarding the above described medical concern, recommendations regarding management and documenting clinical information in the EHR.

## 2021-03-19 DIAGNOSIS — K59.01 SLOW TRANSIT CONSTIPATION: ICD-10-CM

## 2021-03-19 RX ORDER — SENNOSIDES AND DOCUSATE SODIUM 8.6; 5 MG/1; MG/1
TABLET ORAL
Qty: 120 TABLET | Refills: 1 | Status: SHIPPED | OUTPATIENT
Start: 2021-03-19 | End: 2021-07-16

## 2021-03-19 NOTE — TELEPHONE ENCOUNTER
"Requested Prescriptions   Pending Prescriptions Disp Refills     SM SENNA-S 8.6-50 MG tablet [Pharmacy Med Name: SM LAX PLUS STOOL SOFT TAB] 120 tablet 5     Sig: TAKE 1 TABLET BY MOUTH 2 TIMES DAILY       Laxatives Protocol Passed - 3/19/2021 11:00 AM        Passed - Patient is age 6 or older        Passed - Recent (12 mo) or future (30 days) visit within the authorizing provider's specialty     Patient has had an office visit with the authorizing provider or a provider within the authorizing providers department within the previous 12 mos or has a future within next 30 days. See \"Patient Info\" tab in inbasket, or \"Choose Columns\" in Meds & Orders section of the refill encounter.              Passed - Medication is active on med list           LOV 11/10/20 Liebe  Prescription approved per [facility] Refill Protocol.  Brenda J. Goodell, RN on 3/19/2021 at 1:42 PM    "

## 2021-03-22 ENCOUNTER — MYC MEDICAL ADVICE (OUTPATIENT)
Dept: FAMILY MEDICINE | Facility: OTHER | Age: 46
End: 2021-03-22

## 2021-04-24 ENCOUNTER — HEALTH MAINTENANCE LETTER (OUTPATIENT)
Age: 46
End: 2021-04-24

## 2021-05-12 ENCOUNTER — OFFICE VISIT (OUTPATIENT)
Dept: FAMILY MEDICINE | Facility: OTHER | Age: 46
End: 2021-05-12
Attending: FAMILY MEDICINE
Payer: COMMERCIAL

## 2021-05-12 ENCOUNTER — HOSPITAL ENCOUNTER (OUTPATIENT)
Dept: GENERAL RADIOLOGY | Facility: OTHER | Age: 46
End: 2021-05-12
Attending: FAMILY MEDICINE
Payer: COMMERCIAL

## 2021-05-12 VITALS
OXYGEN SATURATION: 98 % | DIASTOLIC BLOOD PRESSURE: 68 MMHG | HEART RATE: 74 BPM | TEMPERATURE: 97.4 F | BODY MASS INDEX: 41.04 KG/M2 | WEIGHT: 270.8 LBS | SYSTOLIC BLOOD PRESSURE: 118 MMHG | RESPIRATION RATE: 16 BRPM | HEIGHT: 68 IN

## 2021-05-12 DIAGNOSIS — G89.29 CHRONIC PAIN OF RIGHT KNEE: ICD-10-CM

## 2021-05-12 DIAGNOSIS — M25.561 CHRONIC PAIN OF RIGHT KNEE: Primary | ICD-10-CM

## 2021-05-12 DIAGNOSIS — M25.561 CHRONIC PAIN OF RIGHT KNEE: ICD-10-CM

## 2021-05-12 DIAGNOSIS — R35.0 URINARY FREQUENCY: ICD-10-CM

## 2021-05-12 DIAGNOSIS — E66.01 MORBID OBESITY (H): ICD-10-CM

## 2021-05-12 DIAGNOSIS — G89.29 CHRONIC PAIN OF RIGHT KNEE: Primary | ICD-10-CM

## 2021-05-12 DIAGNOSIS — R11.2 NON-INTRACTABLE VOMITING WITH NAUSEA: ICD-10-CM

## 2021-05-12 LAB
ALBUMIN UR-MCNC: NEGATIVE MG/DL
ANION GAP SERPL CALCULATED.3IONS-SCNC: 6 MMOL/L (ref 3–14)
APPEARANCE UR: CLEAR
BILIRUB UR QL STRIP: NEGATIVE
BUN SERPL-MCNC: 10 MG/DL (ref 7–25)
CALCIUM SERPL-MCNC: 9.9 MG/DL (ref 8.6–10.3)
CHLORIDE SERPL-SCNC: 104 MMOL/L (ref 98–107)
CO2 SERPL-SCNC: 29 MMOL/L (ref 21–31)
COLOR UR AUTO: YELLOW
CREAT SERPL-MCNC: 1.01 MG/DL (ref 0.7–1.3)
GFR SERPL CREATININE-BSD FRML MDRD: 80 ML/MIN/{1.73_M2}
GLUCOSE SERPL-MCNC: 103 MG/DL (ref 70–105)
GLUCOSE UR STRIP-MCNC: NEGATIVE MG/DL
HGB UR QL STRIP: NEGATIVE
KETONES UR STRIP-MCNC: NEGATIVE MG/DL
LEUKOCYTE ESTERASE UR QL STRIP: NEGATIVE
NITRATE UR QL: NEGATIVE
PH UR STRIP: 5.5 PH (ref 5–7)
POTASSIUM SERPL-SCNC: 4.5 MMOL/L (ref 3.5–5.1)
SODIUM SERPL-SCNC: 139 MMOL/L (ref 134–144)
SOURCE: NORMAL
SP GR UR STRIP: 1.01 (ref 1–1.03)
UROBILINOGEN UR STRIP-MCNC: NORMAL MG/DL (ref 0–2)

## 2021-05-12 PROCEDURE — 36415 COLL VENOUS BLD VENIPUNCTURE: CPT | Mod: ZL | Performed by: FAMILY MEDICINE

## 2021-05-12 PROCEDURE — 81003 URINALYSIS AUTO W/O SCOPE: CPT | Mod: ZL | Performed by: FAMILY MEDICINE

## 2021-05-12 PROCEDURE — G0463 HOSPITAL OUTPT CLINIC VISIT: HCPCS

## 2021-05-12 PROCEDURE — 99214 OFFICE O/P EST MOD 30 MIN: CPT | Performed by: FAMILY MEDICINE

## 2021-05-12 PROCEDURE — 73562 X-RAY EXAM OF KNEE 3: CPT | Mod: RT

## 2021-05-12 PROCEDURE — G0463 HOSPITAL OUTPT CLINIC VISIT: HCPCS | Mod: 25

## 2021-05-12 PROCEDURE — 80048 BASIC METABOLIC PNL TOTAL CA: CPT | Mod: ZL | Performed by: FAMILY MEDICINE

## 2021-05-12 RX ORDER — ETODOLAC 500 MG
500 TABLET ORAL 2 TIMES DAILY
Qty: 30 TABLET | Refills: 4 | Status: SHIPPED | OUTPATIENT
Start: 2021-05-12 | End: 2021-06-04

## 2021-05-12 RX ORDER — ARIPIPRAZOLE 5 MG/1
1 TABLET ORAL DAILY
COMMUNITY
Start: 2021-04-20 | End: 2021-08-31

## 2021-05-12 RX ORDER — OMEPRAZOLE 20 MG/1
20 TABLET, DELAYED RELEASE ORAL DAILY
Qty: 30 TABLET | Refills: 0 | Status: SHIPPED | OUTPATIENT
Start: 2021-05-12 | End: 2021-06-04

## 2021-05-12 ASSESSMENT — PAIN SCALES - GENERAL: PAINLEVEL: SEVERE PAIN (7)

## 2021-05-12 ASSESSMENT — PATIENT HEALTH QUESTIONNAIRE - PHQ9
SUM OF ALL RESPONSES TO PHQ QUESTIONS 1-9: 0
5. POOR APPETITE OR OVEREATING: NOT AT ALL

## 2021-05-12 ASSESSMENT — ANXIETY QUESTIONNAIRES
1. FEELING NERVOUS, ANXIOUS, OR ON EDGE: NOT AT ALL
2. NOT BEING ABLE TO STOP OR CONTROL WORRYING: NOT AT ALL
GAD7 TOTAL SCORE: 0
5. BEING SO RESTLESS THAT IT IS HARD TO SIT STILL: NOT AT ALL
3. WORRYING TOO MUCH ABOUT DIFFERENT THINGS: NOT AT ALL
7. FEELING AFRAID AS IF SOMETHING AWFUL MIGHT HAPPEN: NOT AT ALL
6. BECOMING EASILY ANNOYED OR IRRITABLE: NOT AT ALL

## 2021-05-12 ASSESSMENT — MIFFLIN-ST. JEOR: SCORE: 2092.84

## 2021-05-12 NOTE — PROGRESS NOTES
"  SUBJECTIVE:   Tenzin Ca is a 45 year old male who presents to clinic today for the following health issues: Discussed multiple issues    Patient would like to discuss multiple issues.  #1 right knee pain.  Is been going on for 5 months.  States it hurts to walk.  He reports popping and cracking.  He did have arthroscopy he believes 2 years ago.  Reports getting out of chairs causes quite a bit of knee pain.  He has not noticed any swelling or giving away.  He also like to talk about his urination.  He states he pees all the time.  He drinks 6 to 8 glasses of water per day.  And he would like this discussed vomiting after meals.  He reports that he vomits all his food up after every meal.  Review of the chart shows his weight is in creasing and he understands that but is as you know why.  States he cannot keep anything down.        Patient Active Problem List    Diagnosis Date Noted     Morbid obesity (H) 05/12/2021     Priority: Medium     Non-intractable vomiting with nausea 05/12/2021     Priority: Medium     Vitamin D deficiency 12/26/2018     Priority: Medium     Slow transit constipation 02/22/2018     Priority: Medium     Hypertriglyceridemia 02/20/2018     Priority: Medium     Major depressive disorder, recurrent episode (H) 02/20/2018     Priority: Medium     Psoriasis 02/20/2018     Priority: Medium     Schizophrenia (H) 02/20/2018     Priority: Medium     Overview:   hears voices       Torn medial meniscus 02/20/2018     Priority: Medium     Obesity 02/21/2017     Priority: Medium     S/P left knee arthroscopy 07/05/2016     Priority: Medium     Psychosis (H) 07/02/2015     Priority: Medium     Erectile dysfunction 04/23/2014     Priority: Medium     Delayed ejaculation 06/03/2013     Priority: Medium       Review of Systems     OBJECTIVE:     /68   Pulse 74   Temp 97.4  F (36.3  C)   Resp 16   Ht 1.727 m (5' 8\")   Wt 122.8 kg (270 lb 12.8 oz)   SpO2 98%   BMI 41.17 kg/m    Body mass " index is 41.17 kg/m .  Physical Exam  Constitutional:       Appearance: He is obese.   HENT:      Head: Normocephalic.   Cardiovascular:      Rate and Rhythm: Normal rate and regular rhythm.   Abdominal:      General: There is no distension.      Palpations: There is no mass.      Tenderness: There is no abdominal tenderness. There is no guarding.      Hernia: No hernia is present.   Musculoskeletal: Normal range of motion.         General: No swelling, tenderness or deformity.   Skin:     General: Skin is warm.   Neurological:      Mental Status: He is alert.   Psychiatric:         Mood and Affect: Mood normal.      Comments: Speech is a little tangential         Diagnostic Test Results:  Knee x-ray was unremarkable  ASSESSMENT/PLAN:         1. Chronic pain of right knee  X-rays unremarkable we will try Lodine.  Referral if no improvement.  - XR Knee Right 3 Views; Future  - etodolac (LODINE) 500 MG tablet; Take 1 tablet (500 mg) by mouth 2 times daily  Dispense: 30 tablet; Refill: 4    2. Urinary frequency  UA does show a decreased concentration.  I suspect his healing intake of water is contributing to his urinary frequency.  Advised to cut down a little bit on his water intake  - Basic Metabolic Panel; Future  - UA reflex to Microscopic; Future  - Basic Metabolic Panel  - UA reflex to Microscopic    3. Morbid obesity (H)  Discussed losing weight    4. Non-intractable vomiting with nausea  Etiology unclear.  Try omeprazole and follow-up in 2 weeks- omeprazole (PRILOSEC OTC) 20 MG EC tablet; Take 1 tablet (20 mg) by mouth daily  Dispense: 30 tablet; Refill: 0      Ervin Hassan MD  [facility] AND [facility]

## 2021-05-12 NOTE — NURSING NOTE
Patient here for yearly physical and medication review. Concerns with right knee pain for the past 5 months. Last eye exam longer than a year and last dental exam May 3rd, 2021. Medication Reconciliation: complete.    Jaquelin Aguilera LPN  5/12/2021 12:43 PM

## 2021-05-13 ASSESSMENT — ANXIETY QUESTIONNAIRES: GAD7 TOTAL SCORE: 0

## 2021-05-26 ENCOUNTER — OFFICE VISIT (OUTPATIENT)
Dept: FAMILY MEDICINE | Facility: OTHER | Age: 46
End: 2021-05-26
Attending: FAMILY MEDICINE
Payer: COMMERCIAL

## 2021-05-26 VITALS
OXYGEN SATURATION: 95 % | DIASTOLIC BLOOD PRESSURE: 70 MMHG | TEMPERATURE: 97.5 F | SYSTOLIC BLOOD PRESSURE: 106 MMHG | BODY MASS INDEX: 42.06 KG/M2 | WEIGHT: 276.6 LBS | HEART RATE: 76 BPM | RESPIRATION RATE: 16 BRPM

## 2021-05-26 DIAGNOSIS — R22.9 SKIN NODULE: Primary | ICD-10-CM

## 2021-05-26 PROBLEM — L72.3 SEBACEOUS CYST: Status: ACTIVE | Noted: 2021-05-26

## 2021-05-26 PROCEDURE — 99213 OFFICE O/P EST LOW 20 MIN: CPT | Performed by: FAMILY MEDICINE

## 2021-05-26 PROCEDURE — G0463 HOSPITAL OUTPT CLINIC VISIT: HCPCS

## 2021-05-26 ASSESSMENT — PAIN SCALES - GENERAL: PAINLEVEL: NO PAIN (0)

## 2021-05-26 NOTE — PROGRESS NOTES
SUBJECTIVE:   Tenzin Ca is a 45 year old male who presents to clinic today for the following health issues: Follow-up knee recurrent nasal swelling    Patient arrives here for follow-up of his knee.  He reports quite a bit of improvement in the pain.  He also would like his nose looked at.  He did have a intradermal nevus removed.  This was done back in 2019.  He reports pain.        Patient Active Problem List    Diagnosis Date Noted     Sebaceous cyst 05/26/2021     Priority: Medium     Skin nodule 05/26/2021     Priority: Medium     Morbid obesity (H) 05/12/2021     Priority: Medium     Non-intractable vomiting with nausea 05/12/2021     Priority: Medium     Vitamin D deficiency 12/26/2018     Priority: Medium     Slow transit constipation 02/22/2018     Priority: Medium     Hypertriglyceridemia 02/20/2018     Priority: Medium     Major depressive disorder, recurrent episode (H) 02/20/2018     Priority: Medium     Psoriasis 02/20/2018     Priority: Medium     Schizophrenia (H) 02/20/2018     Priority: Medium     Overview:   hears voices       Torn medial meniscus 02/20/2018     Priority: Medium     Obesity 02/21/2017     Priority: Medium     S/P left knee arthroscopy 07/05/2016     Priority: Medium     Psychosis (H) 07/02/2015     Priority: Medium     Erectile dysfunction 04/23/2014     Priority: Medium     Delayed ejaculation 06/03/2013     Priority: Medium       Review of Systems     OBJECTIVE:     /70   Pulse 76   Temp 97.5  F (36.4  C)   Resp 16   Wt 125.5 kg (276 lb 9.6 oz)   SpO2 95%   BMI 42.06 kg/m    Body mass index is 42.06 kg/m .  Physical Exam  Constitutional:       Appearance: Normal appearance.   Skin:     Comments: Patient does have a nodule on the right side of his nose.   Neurological:      Mental Status: He is alert.         Diagnostic Test Results:  none     ASSESSMENT/PLAN:         1. Skin nodule  Referral to general surgery  - GENERAL SURG ADULT REFERRAL; Future    Knee pain  currently doing well.  Follow-up as needed      Ervin Hassan MD  [facility] AND [facility]

## 2021-05-26 NOTE — NURSING NOTE
Patient here for follow up right knee pain. The pain is better with the medication. Medication Reconciliation: complete.    Jaquelin Aguilera LPN  5/26/2021 12:42 PM

## 2021-06-04 ENCOUNTER — OFFICE VISIT (OUTPATIENT)
Dept: SURGERY | Facility: OTHER | Age: 46
End: 2021-06-04
Attending: SURGERY
Payer: COMMERCIAL

## 2021-06-04 VITALS
BODY MASS INDEX: 40.2 KG/M2 | HEIGHT: 69 IN | HEART RATE: 80 BPM | DIASTOLIC BLOOD PRESSURE: 62 MMHG | WEIGHT: 271.4 LBS | OXYGEN SATURATION: 94 % | SYSTOLIC BLOOD PRESSURE: 118 MMHG | TEMPERATURE: 97.6 F

## 2021-06-04 DIAGNOSIS — R22.9 SKIN NODULE: ICD-10-CM

## 2021-06-04 PROCEDURE — 11440 EXC FACE-MM B9+MARG 0.5 CM/<: CPT | Performed by: SURGERY

## 2021-06-04 PROCEDURE — G0463 HOSPITAL OUTPT CLINIC VISIT: HCPCS

## 2021-06-04 RX ORDER — SILDENAFIL CITRATE 20 MG/1
TABLET ORAL
COMMUNITY
Start: 2021-05-26 | End: 2021-09-14

## 2021-06-04 ASSESSMENT — MIFFLIN-ST. JEOR: SCORE: 2111.44

## 2021-06-04 ASSESSMENT — PAIN SCALES - GENERAL: PAINLEVEL: MODERATE PAIN (5)

## 2021-06-04 NOTE — NURSING NOTE
"Chief Complaint   Patient presents with     Procedure     lump by nose        Initial /62 (BP Location: Right arm, Patient Position: Sitting, Cuff Size: Adult Regular)   Pulse 80   Temp 97.6  F (36.4  C) (Tympanic)   Ht 1.753 m (5' 9\")   Wt 123.1 kg (271 lb 6.4 oz)   SpO2 94%   BMI 40.08 kg/m   Estimated body mass index is 40.08 kg/m  as calculated from the following:    Height as of this encounter: 1.753 m (5' 9\").    Weight as of this encounter: 123.1 kg (271 lb 6.4 oz).  Medication Reconciliation: Completed     Prior to the start of the procedure and with procedural staff participation, I verbally confirmed the patient s identity using two indicators, relevant allergies, that the procedure was appropriate and matched the consent or emergent situation, and that the correct equipment/implants were available. Immediately prior to starting the procedure I conducted the Time Out with the procedural staff and re-confirmed the patient s name, procedure, and site/side. (The Joint Commission universal protocol was followed.)  Yes    Sedation (Moderate or Deep): None      Sia Kern LPN  "

## 2021-06-04 NOTE — PROGRESS NOTES
Procedure Note      Pre/Post Operative Diagnosis:   Right nasal bridge cyst     Procedure:   Removal of Right nasal bridge cyst      Surgeon: Frank Toro MD    Local Anesthesia: 1% lidocaine with 0.25% Marcaine with epinephrine    Indication for the procedure:  This is a 45 year old male  patient referred by Ervin Hassan with a Right nasal bridge cyst.       After explaining the risks to include bleeding, infection, recurrence or need for reexcision, and scarring the patient wished to proceed.    Procedure:   The area was prepped and draped in usual sterile fashion with ChloraPrep.   After, adequate local anesthesia, an 1 cm X 0.5 cm elliptical skin incision was made to encompass the  0.8 cm cyst. The cyst was sectioned and discarded.  The skin was closed with 4-0 Monocryl and Dermabond.      Plan:   Patient will follow up if there any problems with the wound including redness or drainage.      Frank Toro MD....................  6/4/2021   1:25 PM

## 2021-07-16 DIAGNOSIS — K59.01 SLOW TRANSIT CONSTIPATION: ICD-10-CM

## 2021-07-16 RX ORDER — DOCUSATE SODIUM 50 MG AND SENNOSIDES 8.6 MG 8.6; 5 MG/1; MG/1
TABLET, FILM COATED ORAL
Qty: 120 TABLET | Refills: 1 | Status: SHIPPED | OUTPATIENT
Start: 2021-07-16 | End: 2021-11-19

## 2021-07-16 NOTE — TELEPHONE ENCOUNTER
Prescription refilled per RN Medication Refill Policy..................Meka Pickett RN 7/16/2021 10:53 AM

## 2021-08-13 ENCOUNTER — APPOINTMENT (OUTPATIENT)
Dept: ULTRASOUND IMAGING | Facility: OTHER | Age: 46
End: 2021-08-13
Attending: STUDENT IN AN ORGANIZED HEALTH CARE EDUCATION/TRAINING PROGRAM
Payer: COMMERCIAL

## 2021-08-13 ENCOUNTER — NURSE TRIAGE (OUTPATIENT)
Dept: FAMILY MEDICINE | Facility: OTHER | Age: 46
End: 2021-08-13

## 2021-08-13 ENCOUNTER — HOSPITAL ENCOUNTER (EMERGENCY)
Facility: OTHER | Age: 46
Discharge: HOME | End: 2021-08-13
Attending: STUDENT IN AN ORGANIZED HEALTH CARE EDUCATION/TRAINING PROGRAM | Admitting: STUDENT IN AN ORGANIZED HEALTH CARE EDUCATION/TRAINING PROGRAM
Payer: COMMERCIAL

## 2021-08-13 ENCOUNTER — TELEPHONE (OUTPATIENT)
Dept: FAMILY MEDICINE | Facility: OTHER | Age: 46
End: 2021-08-13

## 2021-08-13 VITALS
OXYGEN SATURATION: 98 % | WEIGHT: 271 LBS | RESPIRATION RATE: 18 BRPM | TEMPERATURE: 98.6 F | BODY MASS INDEX: 40.02 KG/M2 | SYSTOLIC BLOOD PRESSURE: 150 MMHG | HEART RATE: 79 BPM | DIASTOLIC BLOOD PRESSURE: 96 MMHG

## 2021-08-13 DIAGNOSIS — R20.0 RIGHT LEG NUMBNESS: ICD-10-CM

## 2021-08-13 LAB
ANION GAP SERPL CALCULATED.3IONS-SCNC: 8 MMOL/L (ref 3–14)
BASOPHILS # BLD AUTO: 0 10E3/UL (ref 0–0.2)
BASOPHILS NFR BLD AUTO: 1 %
BUN SERPL-MCNC: 9 MG/DL (ref 7–25)
CALCIUM SERPL-MCNC: 9.6 MG/DL (ref 8.6–10.3)
CHLORIDE BLD-SCNC: 104 MMOL/L (ref 98–107)
CK SERPL-CCNC: 93 U/L (ref 30–223)
CO2 SERPL-SCNC: 28 MMOL/L (ref 21–31)
CREAT SERPL-MCNC: 1.14 MG/DL (ref 0.7–1.3)
CRP SERPL-MCNC: 5.8 MG/L
D DIMER PPP FEU-MCNC: <=0.27 UG/ML FEU (ref 0–0.5)
EOSINOPHIL # BLD AUTO: 0.2 10E3/UL (ref 0–0.7)
EOSINOPHIL NFR BLD AUTO: 3 %
ERYTHROCYTE [DISTWIDTH] IN BLOOD BY AUTOMATED COUNT: 12 % (ref 10–15)
ERYTHROCYTE [SEDIMENTATION RATE] IN BLOOD BY WESTERGREN METHOD: 13 MM/HR (ref 0–15)
GFR SERPL CREATININE-BSD FRML MDRD: 77 ML/MIN/1.73M2
GLUCOSE BLD-MCNC: 114 MG/DL (ref 70–105)
HCT VFR BLD AUTO: 39.9 % (ref 40–53)
HGB BLD-MCNC: 13.9 G/DL (ref 13.3–17.7)
IMM GRANULOCYTES # BLD: 0.1 10E3/UL
IMM GRANULOCYTES NFR BLD: 1 %
LYMPHOCYTES # BLD AUTO: 1.1 10E3/UL (ref 0.8–5.3)
LYMPHOCYTES NFR BLD AUTO: 20 %
MCH RBC QN AUTO: 30.6 PG (ref 26.5–33)
MCHC RBC AUTO-ENTMCNC: 34.8 G/DL (ref 31.5–36.5)
MCV RBC AUTO: 88 FL (ref 78–100)
MONOCYTES # BLD AUTO: 0.5 10E3/UL (ref 0–1.3)
MONOCYTES NFR BLD AUTO: 9 %
NEUTROPHILS # BLD AUTO: 3.4 10E3/UL (ref 1.6–8.3)
NEUTROPHILS NFR BLD AUTO: 66 %
NRBC # BLD AUTO: 0 10E3/UL
NRBC BLD AUTO-RTO: 0 /100
PLATELET # BLD AUTO: 180 10E3/UL (ref 150–450)
POTASSIUM BLD-SCNC: 4 MMOL/L (ref 3.5–5.1)
RBC # BLD AUTO: 4.54 10E6/UL (ref 4.4–5.9)
SODIUM SERPL-SCNC: 140 MMOL/L (ref 134–144)
TROPONIN I SERPL-MCNC: 2.6 PG/ML (ref 0–34)
WBC # BLD AUTO: 5.2 10E3/UL (ref 4–11)

## 2021-08-13 PROCEDURE — 99284 EMERGENCY DEPT VISIT MOD MDM: CPT | Mod: 25 | Performed by: STUDENT IN AN ORGANIZED HEALTH CARE EDUCATION/TRAINING PROGRAM

## 2021-08-13 PROCEDURE — 82550 ASSAY OF CK (CPK): CPT | Performed by: STUDENT IN AN ORGANIZED HEALTH CARE EDUCATION/TRAINING PROGRAM

## 2021-08-13 PROCEDURE — 85652 RBC SED RATE AUTOMATED: CPT | Performed by: STUDENT IN AN ORGANIZED HEALTH CARE EDUCATION/TRAINING PROGRAM

## 2021-08-13 PROCEDURE — 86140 C-REACTIVE PROTEIN: CPT | Performed by: STUDENT IN AN ORGANIZED HEALTH CARE EDUCATION/TRAINING PROGRAM

## 2021-08-13 PROCEDURE — 99283 EMERGENCY DEPT VISIT LOW MDM: CPT | Performed by: STUDENT IN AN ORGANIZED HEALTH CARE EDUCATION/TRAINING PROGRAM

## 2021-08-13 PROCEDURE — 84484 ASSAY OF TROPONIN QUANT: CPT | Performed by: STUDENT IN AN ORGANIZED HEALTH CARE EDUCATION/TRAINING PROGRAM

## 2021-08-13 PROCEDURE — 85025 COMPLETE CBC W/AUTO DIFF WBC: CPT | Performed by: STUDENT IN AN ORGANIZED HEALTH CARE EDUCATION/TRAINING PROGRAM

## 2021-08-13 PROCEDURE — 80048 BASIC METABOLIC PNL TOTAL CA: CPT | Performed by: STUDENT IN AN ORGANIZED HEALTH CARE EDUCATION/TRAINING PROGRAM

## 2021-08-13 PROCEDURE — 93971 EXTREMITY STUDY: CPT | Mod: RT

## 2021-08-13 PROCEDURE — 36415 COLL VENOUS BLD VENIPUNCTURE: CPT | Performed by: STUDENT IN AN ORGANIZED HEALTH CARE EDUCATION/TRAINING PROGRAM

## 2021-08-13 PROCEDURE — 93926 LOWER EXTREMITY STUDY: CPT | Mod: RT

## 2021-08-13 PROCEDURE — 85379 FIBRIN DEGRADATION QUANT: CPT | Performed by: STUDENT IN AN ORGANIZED HEALTH CARE EDUCATION/TRAINING PROGRAM

## 2021-08-13 PROCEDURE — 93005 ELECTROCARDIOGRAM TRACING: CPT | Performed by: STUDENT IN AN ORGANIZED HEALTH CARE EDUCATION/TRAINING PROGRAM

## 2021-08-13 NOTE — ED TRIAGE NOTES
ED Nursing Triage Note (General)   ________________________________    Tenzin Ca is a 46 year old Male that presents to triage via private vehicle with complaints of numbness to his R) leg.  Patient states numbness began 2 weeks ago with just his foot and has now spread through his entire R) leg.  Patient states he has no hx of neuropathy, no pain noted at this time.  Patient states affected extremity has also been cool to the touch. Patient states he called the triage line and was advised to come to the ED.   Significant symptoms had onset 2 weeks ago.  GCS-15  Airway: intact  Breathing noted as Normal  Action taken: 3      PRE HOSPITAL PRIOR LIVING SITUATION-home

## 2021-08-13 NOTE — PROGRESS NOTES
Patient was brought back to their room, rails were up and call light was within reach.     Handoff procedure information verbally given to RN and MD. Another sonographer will come in to do the arterial study.

## 2021-08-13 NOTE — TELEPHONE ENCOUNTER
Patient would like a work in Tuesday 8/17/21 for follow up rt knee numb.Patient was seen in th e ER. Patient aware MBL is out today.    Sosa Swanson on 8/13/2021 at 1:19 PM

## 2021-08-13 NOTE — TELEPHONE ENCOUNTER
"Patient called c/o leg numbness. Pt verified his last name and . Pt triaged:    Reason for Disposition    Entire foot is cool or blue in comparison to other side    Additional Information    Negative: Looks like a broken bone or dislocated joint (e.g., crooked or deformed)    Negative: Sounds like a life-threatening emergency to the triager    Negative: Followed a hip injury    Negative: Followed a knee injury    Negative: Followed an ankle or foot injury    Negative: Back pain radiating (shooting) into leg(s)    Negative: Foot pain is the main symptom    Negative: Ankle pain is the main symptom    Negative: Knee pain is the main symptom    Negative: Leg swelling is the main symptom    Negative: Chest pain    Negative: Difficulty breathing    Answer Assessment - Initial Assessment Questions  1. ONSET:  2 weeks ago    2. LOCATION:  Entire right leg.    3. PAIN: \"How bad is the pain?\"    (Scale 1-10; or mild, moderate, severe)  MODERATE (7/10): interferes with normal activities, or awakens from sleep, limping     4. WORK OR EXERCISE: \"Has there been any recent work or exercise that involved this part of the body?\"   Denies    5. CAUSE: \"What do you think is causing the leg pain?\"  Unsure    6. OTHER SYMPTOMS:  Numbness, tingling, entire right leg cold to touch (sweating, due to apartment 80 degrees), strange sensations: when he walks, he feels like his skin on his leg doubles over, and like he has a big ball under his foot (nothing different upon inspection)    Denies: chest pain, back pain, breathing difficulty, swelling, rash, fever, weakness, headache, dizziness, loss of vision, change in speech    Protocols used: LEG PAIN-A-OH    Protocol recommends Pt GO TO ED NOW, due to entire foot being cool compared to other side, to R/O iliofemoral arterial occlusion (ischemic foot). The patient indicates understanding of these issues and agrees with the plan. Christine Palomino RN .............. 2021  8:48 AM      "

## 2021-08-13 NOTE — ED PROVIDER NOTES
History     Chief Complaint   Patient presents with     Numbness     HPI  Tenzin Ca is a 46 year old man with history of morbid obesity, depression, hypertriglyceridemia, schizophrenia presents for evaluation of right leg numbness.  He states numbness started in his right foot about 2 weeks ago and is progressively ascended to a few inches below his knee.  He states that the numbness is circumferential all the way around the leg and not more than one side of the other.  He also reports that he feels like his foot and leg are cold to the touch compared to the other side.  He denies any fevers or chills, nausea or vomiting, chest pain or difficulty breathing, dental pain, back pain, or numbness/tingling or weakness in his other extremities.  Notably he denies any weakness in his right lower leg and has been able to walk but does states it feels quite.  He denies any recent illnesses or vaccines within the last 2 months.  No prior history of vascular disease or palpitations    Allergies: He also failed  No Known Allergies    Problem List:    Patient Active Problem List    Diagnosis Date Noted     Sebaceous cyst 05/26/2021     Priority: Medium     Skin nodule 05/26/2021     Priority: Medium     Morbid obesity (H) 05/12/2021     Priority: Medium     Non-intractable vomiting with nausea 05/12/2021     Priority: Medium     Vitamin D deficiency 12/26/2018     Priority: Medium     Slow transit constipation 02/22/2018     Priority: Medium     Hypertriglyceridemia 02/20/2018     Priority: Medium     Major depressive disorder, recurrent episode (H) 02/20/2018     Priority: Medium     Psoriasis 02/20/2018     Priority: Medium     Schizophrenia (H) 02/20/2018     Priority: Medium     Overview:   hears voices       Torn medial meniscus 02/20/2018     Priority: Medium     Obesity 02/21/2017     Priority: Medium     S/P left knee arthroscopy 07/05/2016     Priority: Medium     Psychosis (H) 07/02/2015     Priority: Medium      Erectile dysfunction 04/23/2014     Priority: Medium     Delayed ejaculation 06/03/2013     Priority: Medium        Past Medical History:    Past Medical History:   Diagnosis Date     Mental disorder      Personal history of other mental and behavioral disorders        Past Surgical History:    Past Surgical History:   Procedure Laterality Date     ARTHROSCOPY KNEE      Left Knee Arthroscopy and Partial Meniscectomy and Chondroplasty       Family History:    Family History   Problem Relation Age of Onset     Substance Abuse Mother         Alcohol/Drug     Alcoholism Mother         Alcoholism     Substance Abuse Brother         Alcohol/Drug     Alcoholism Brother         Alcoholism     Substance Abuse Sister         Alcohol/Drug     Alcoholism Sister         Alcoholism       Social History:  Marital Status:  Single [1]  Social History     Tobacco Use     Smoking status: Never Smoker     Smokeless tobacco: Never Used   Substance Use Topics     Alcohol use: No     Alcohol/week: 0.0 standard drinks     Drug use: No        Medications:    ARIPiprazole (ABILIFY) 20 MG tablet  ARIPiprazole (ABILIFY) 5 MG tablet  clonazePAM (KLONOPIN) 0.5 MG tablet  haloperidol (HALDOL) 0.5 MG tablet  hydrOXYzine (ATARAX) 50 MG tablet  ibuprofen (ADVIL/MOTRIN) 200 MG tablet  OXcarbazepine (TRILEPTAL) 300 MG tablet  OXcarbazepine (TRILEPTAL) 600 MG tablet  prazosin (MINIPRESS) 1 MG capsule  sildenafil (REVATIO) 20 MG tablet  STIMULANT LAXATIVE 8.6-50 MG tablet  zolpidem (AMBIEN) 10 MG tablet          Review of Systems  Please see HPI above for pertinent positives and negatives.  All other systems reviewed and found negative.    Physical Exam   BP: (!) 150/96  Pulse: 79  Temp: 98.6  F (37  C)  Resp: 18  Weight: 122.9 kg (271 lb)  SpO2: 98 %      Physical Exam  GEN: Lying in bed, appears anxious but in no acute distress, alert  HEENT: Normocephalic and atraumatic, mucous membranes moist, conjunctiva clear  CV: Regular rate and rhythm, appears  warm well-perfused  Pulmonary: Clear to auscultation bilaterally, normal respiratory rate  Abdomen: Soft, nontender, distended  Skin: No rash or other lesions, skin is grossly normal temperature and symmetric bilateral lower extremities, neither cool or excessively warm to touch  MSK: No gross deformities or swelling, lower extremities are symmetric, full range of motion right ankle and knee  Neuro: Alert oriented x4, decreased sensation to light touch from approximately 3 inches below the right knee distally down to the tips of the toes, circumferential.  Strength 5 out of 5 and symmetric compared to contralateral side and right lower extremity, no other focal neurologic deficits, coordination intact, cranial 2 through 12 grossly intact, EOMI, PERRL    ED Course     ED Course as of Aug 14 0639   Fri Aug 13, 2021   0935 Patient evaluated, reports progressively worsening right foot followed by right lower leg numbness and pain over the past 2 weeks, also reports significant pain associated with this.  Strength intact and symmetric on exam.  Numbness is circumferential involving the foot to just below the knee.  Findings are not consistent with radiculopathy or stroke, pulses appear normal and symmetric bilaterally with concern for possible arterial occlusion.  We will plan for arterial and venous ultrasound, labs     0959 CBC unremarkable      1011 D-dimer within normal limits, suspicion for clot is low      1024 BMP within normal limits, troponin within normal limits, CK normal, CRP normal      1046 ESR normal      1201 Ultrasound right lower extremity negative for DVT      1237 Arterial ultrasound normal. Will re-evaluate patient, plan for discharge with close outpatient follow-up for recheck early next week        Procedures              Critical Care time:  none               Results for orders placed or performed during the hospital encounter of 08/13/21 (from the past 24 hour(s))   CBC with platelets  differential    Narrative    The following orders were created for panel order CBC with platelets differential.  Procedure                               Abnormality         Status                     ---------                               -----------         ------                     CBC with platelets and d...[[id number]]  Abnormal            Final result                 Please view results for these tests on the individual orders.   D dimer quantitative   Result Value Ref Range    D-Dimer Quantitative <=0.27 0.00 - 0.50 ug/mL FEU    Narrative    This D-dimer assay is intended for use in conjunction with a clinical pretest probability assessment model to exclude pulmonary embolism (PE) and deep venous thrombosis (DVT) in outpatients suspected of PE or DVT. The cut-off value is 0.50 ug/mL FEU.   Basic metabolic panel   Result Value Ref Range    Sodium 140 134 - 144 mmol/L    Potassium 4.0 3.5 - 5.1 mmol/L    Chloride 104 98 - 107 mmol/L    Carbon Dioxide (CO2) 28 21 - 31 mmol/L    Anion Gap 8 3 - 14 mmol/L    Urea Nitrogen 9 7 - 25 mg/dL    Creatinine 1.14 0.70 - 1.30 mg/dL    Calcium 9.6 8.6 - 10.3 mg/dL    Glucose 114 (H) 70 - 105 mg/dL    GFR Estimate 77 >60 mL/min/1.73m2   Troponin I   Result Value Ref Range    Troponin I 2.6 0.0 - 34.0 pg/mL   CK total   Result Value Ref Range    CK 93 30 - 223 U/L   CRP inflammation   Result Value Ref Range    CRP Inflammation 5.8 <10.0 mg/L   Erythrocyte sedimentation rate auto   Result Value Ref Range    Erythrocyte Sedimentation Rate 13 0 - 15 mm/hr   CBC with platelets and differential   Result Value Ref Range    WBC Count 5.2 4.0 - 11.0 10e3/uL    RBC Count 4.54 4.40 - 5.90 10e6/uL    Hemoglobin 13.9 13.3 - 17.7 g/dL    Hematocrit 39.9 (L) 40.0 - 53.0 %    MCV 88 78 - 100 fL    MCH 30.6 26.5 - 33.0 pg    MCHC 34.8 31.5 - 36.5 g/dL    RDW 12.0 10.0 - 15.0 %    Platelet Count 180 150 - 450 10e3/uL    % Neutrophils 66 %    % Lymphocytes 20 %    % Monocytes 9 %    %  Eosinophils 3 %    % Basophils 1 %    % Immature Granulocytes 1 %    NRBCs per 100 WBC 0 <1 /100    Absolute Neutrophils 3.4 1.6 - 8.3 10e3/uL    Absolute Lymphocytes 1.1 0.8 - 5.3 10e3/uL    Absolute Monocytes 0.5 0.0 - 1.3 10e3/uL    Absolute Eosinophils 0.2 0.0 - 0.7 10e3/uL    Absolute Basophils 0.0 0.0 - 0.2 10e3/uL    Absolute Immature Granulocytes 0.1 (H) <=0.0 10e3/uL    Absolute NRBCs 0.0 10e3/uL   US Lower Extremity Venous Duplex Right    Narrative    Procedure: US LOWER EXTREMITY VENOUS DUPLEX RIGHT    HISTORY:  subjective swelling, also numbness and pain right lower leg.    TECHNIQUE: Grayscale, color Doppler and compression views of the deep  venous structures of the right lower extremity.    COMPARISON: None.    FINDINGS:    Interrogation of the deep venous structures from the right common  femoral vein through the veins of the proximal calf demonstrate normal  grayscale appearance, compressibility and augmentable color Doppler  flow.      Impression    IMPRESSION:     No evidence of right lower extremity DVT.      TRAE MENDOZA MD         SYSTEM ID:  [id number]   US Lower Extremity Arterial Duplex Right    Narrative    Exam: US LOWER EXTREMITY ARTERIAL DUPLEX RIGHT    Exam reason: numbness and pain right lower extremity, circumferential,  distal to knee    Comparison: None.    TECHNIQUE: Arterial duplex examination utilizing B-mode, color flow  Doppler and Spectral Doppler.     FINDINGS:     RIGHT:    Peak systolic velocities as follows:    Common femoral artery PSV: proximal 129 cm/sec and distal 81 cm/sec.  Proximal deep femoral artery PSV: 56 cm/sec.  Superficial femoral artery PSV: proximal 86 cm/sec, mid 79 cm/sec, and  distal 77 cm/sec.   Popliteal artery PSV: proximal 69 cm/sec and distal 58 cm/sec  Posterior tibial artery PSV: 81 cm/sec.  Dorsalis pedis artery PSV: 81 cm/sec.  Peroneal artery PSV: 62 cm/sec.    All waveforms are triphasic. No evidence of significant stenosis.       Impression    IMPRESSION:   Normal arterial duplex of the right lower extremity. Waveforms are  triphasic. No evidence of thrombus..    WARNER SONG MD         SYSTEM ID:  [id number]       Medications - No data to display    Assessments & Plan (with Medical Decision Making)   46 year old man with history of morbid obesity, depression, hypertriglyceridemia, schizophrenia presents for evaluation of right leg numbness.  Vitally stable on arrival, afebrile.  Exam with decreased sensation to light touch from approximately 3 inches distal to the right knee joint, extending circumferentially and distally to the toes.  No other focal or neurologic deficits on exam and no evidence of weakness.  Lab work including CBC and inflammatory markers as well as CK was completely unremarkable and within normal limits.  Electrolytes were normal and telemetry showed no evidence of arrhythmia, normal sinus rhythm.  Findings are not consistent with a proximal neurologic lesion lesion of the spine or brain, distribution is not consistent with a dermatome in the lumbosacral spine or even an atypical stroke.  D-dimer was obtained and this was within normal limits as well making clot highly unlikely, however due to patient's concern for subjective coolness of the extremity and decreased sensation ultrasounds of the venous and arterial systems were obtained and were normal.  Patient also demonstrated pulses on exam.  Symptoms may represent a neuropraxia possibly from inadvertent compression, cannot definitively rule out mass lesion however this would be much less likely.  At this time no life-threatening or dangerous etiology is suspected however I did recommend the patient follow-up closely with his doctor in clinic early next week, could consider MRI of the extremity or EEG to further evaluate for mass or possible focal neuropathy at that time.  Careful return precautions provided.    From ED discharge instructions:  No dangerous or  life-threatening cause of your symptoms was discovered today.    The ultrasound showed no evidence of clot.Your lab work was all normal and your symptoms are not consistent with a stroke or a focal nerve impingement in the back.    Take Tylenol 1000 mg up to 3 times daily, as well as ibuprofen 400 mg every 6 hours, as needed for pain.    Schedule an appointment to follow schedule up with your doctor in clinic in 3 to 4 days for recheck unless symptoms completely resolved.      Come back to the emergency department immediately or call 911 if severe worsening pain or spreading of numbness further up the leg, or new weakness, fever, or any other acute concerns.      I have reviewed the nursing notes.    I have reviewed the findings, diagnosis, plan and need for follow up with the patient.       Discharge Medication List as of 8/13/2021 12:49 PM          Final diagnoses:   Right leg numbness       8/13/2021   [facility] AND \[facility] [place] [facility]\"" Tam Pereira MD  08/15/21 0765

## 2021-08-13 NOTE — DISCHARGE INSTRUCTIONS
No dangerous or life-threatening cause of your symptoms was discovered today.    The ultrasound showed no evidence of clot.Your lab work was all normal and your symptoms are not consistent with a stroke or a focal nerve impingement in the back.    Take Tylenol 1000 mg up to 3 times daily, as well as ibuprofen 400 mg every 6 hours, as needed for pain.    Schedule an appointment to follow schedule up with your doctor in clinic in 3 to 4 days for recheck unless symptoms completely resolved.      Come back to the emergency department immediately or call 911 if severe worsening pain or spreading of numbness further up the leg, or new weakness, fever, or any other acute concerns.

## 2021-08-23 ENCOUNTER — TELEPHONE (OUTPATIENT)
Dept: FAMILY MEDICINE | Facility: OTHER | Age: 46
End: 2021-08-23

## 2021-08-24 NOTE — TELEPHONE ENCOUNTER
Spoke with patient he is having numbness and twitching in his knees. He declines pain in the knees. He Has used ice and Ibuprofen and is having difficulty walking long distances. Please advise. Jaquelin Aguilera LPN .......................8/24/2021  10:50 AM

## 2021-08-24 NOTE — TELEPHONE ENCOUNTER
After verifying patient name and  gave the below information. He is going to make an appointment.  Claudia Fierro LPN on 2021 at 1:44 PM

## 2021-08-31 ENCOUNTER — OFFICE VISIT (OUTPATIENT)
Dept: FAMILY MEDICINE | Facility: OTHER | Age: 46
End: 2021-08-31
Attending: FAMILY MEDICINE
Payer: COMMERCIAL

## 2021-08-31 VITALS
HEART RATE: 73 BPM | DIASTOLIC BLOOD PRESSURE: 72 MMHG | HEIGHT: 68 IN | BODY MASS INDEX: 41.07 KG/M2 | OXYGEN SATURATION: 97 % | SYSTOLIC BLOOD PRESSURE: 124 MMHG | RESPIRATION RATE: 20 BRPM | WEIGHT: 271 LBS | TEMPERATURE: 97.3 F

## 2021-08-31 DIAGNOSIS — F20.0 PARANOID SCHIZOPHRENIA (H): Primary | ICD-10-CM

## 2021-08-31 DIAGNOSIS — R29.898 WEAKNESS OF BOTH LOWER EXTREMITIES: ICD-10-CM

## 2021-08-31 PROCEDURE — 99214 OFFICE O/P EST MOD 30 MIN: CPT | Performed by: FAMILY MEDICINE

## 2021-08-31 PROCEDURE — G0463 HOSPITAL OUTPT CLINIC VISIT: HCPCS

## 2021-08-31 RX ORDER — ARIPIPRAZOLE 2 MG/1
2 TABLET ORAL EVERY MORNING
COMMUNITY
Start: 2021-08-18 | End: 2022-02-22

## 2021-08-31 ASSESSMENT — MIFFLIN-ST. JEOR: SCORE: 2088.75

## 2021-08-31 ASSESSMENT — PAIN SCALES - GENERAL: PAINLEVEL: MODERATE PAIN (5)

## 2021-08-31 NOTE — NURSING NOTE
Patient here for swelling and numbness in his legs and feet.  He feels cold, legs feel heavy, low back pain also his private parts feels numb. Medication Reconciliation: complete.    Jaquelin Aguilera LPN  8/31/2021 1:46 PM

## 2021-09-01 PROBLEM — R29.898 WEAKNESS OF BOTH LOWER EXTREMITIES: Status: ACTIVE | Noted: 2021-09-01

## 2021-09-01 PROBLEM — R20.0 BILATERAL LEG NUMBNESS: Status: RESOLVED | Noted: 2021-09-01 | Resolved: 2021-09-01

## 2021-09-01 PROBLEM — R20.0 BILATERAL LEG NUMBNESS: Status: ACTIVE | Noted: 2021-09-01

## 2021-09-01 NOTE — PROGRESS NOTES
"  SUBJECTIVE:   Tenzin Ca is a 46 year old male who presents to clinic today for the following health issues: Follow-up in ER    Patient arrives here for follow-up ER.  He was recently seen in the ER for leg weakness.  Symptoms been going on for about 4 weeks.  He has multiple concerns.  He reports cold at night his legs feel heavy.  Low back pain.  He reports his legs feel numb.  He states that there shocks going up his legs.  He reports hard time breathing.  When he is walking he reports a hard time lifting his legs.  He further goes on to complain about his body is cold.  Electrical shocks.  Trouble sleeping.  Numbness in his feet.  He also reports painful feet.  States that when he pinches his legs it \"feels strange\".  It hurts to walk.  He has trouble with walking.  There is electrical shocks going down his leg.  Recently he was seen because of inability to ejaculate.  His psychiatric provider did decrease his Abilify from 5 mg a day down to 2 mg a day.  Patient is also on Haldol Trileptal Ambien.        Patient Active Problem List    Diagnosis Date Noted     Sebaceous cyst 05/26/2021     Priority: Medium     Skin nodule 05/26/2021     Priority: Medium     Morbid obesity (H) 05/12/2021     Priority: Medium     Non-intractable vomiting with nausea 05/12/2021     Priority: Medium     Vitamin D deficiency 12/26/2018     Priority: Medium     Slow transit constipation 02/22/2018     Priority: Medium     Hypertriglyceridemia 02/20/2018     Priority: Medium     Major depressive disorder, recurrent episode (H) 02/20/2018     Priority: Medium     Psoriasis 02/20/2018     Priority: Medium     Schizophrenia (H) 02/20/2018     Priority: Medium     Overview:   hears voices       Torn medial meniscus 02/20/2018     Priority: Medium     Obesity 02/21/2017     Priority: Medium     S/P left knee arthroscopy 07/05/2016     Priority: Medium     Psychosis (H) 07/02/2015     Priority: Medium     Erectile dysfunction 04/23/2014 " "    Priority: Medium     Delayed ejaculation 06/03/2013     Priority: Medium     Past Medical History:   Diagnosis Date     Mental disorder     Disability related to mental illness     Personal history of other mental and behavioral disorders     2016      Current Outpatient Medications   Medication Sig Dispense Refill     ARIPiprazole (ABILIFY) 2 MG tablet Take 2 mg by mouth every morning       ARIPiprazole (ABILIFY) 20 MG tablet Take 20 mg by mouth every morning  5     clonazePAM (KLONOPIN) 0.5 MG tablet TAKE 1 TAB UP TO TWICE A DAY AS NEEDED FOR SEVERE ANXIETY.  2     haloperidol (HALDOL) 0.5 MG tablet Take 1 tablet by mouth 3 times daily       hydrOXYzine (ATARAX) 50 MG tablet TAKE UP TO 2 TABLETS BY MOUTH TWICE DAILY AS NEEDED FOR ANXIETY  1     ibuprofen (ADVIL/MOTRIN) 200 MG tablet Take 1 tablet (200 mg) by mouth every 4 hours as needed for mild pain 30 tablet 1     OXcarbazepine (TRILEPTAL) 300 MG tablet Take 300 mg by mouth every morning       OXcarbazepine (TRILEPTAL) 600 MG tablet Take 600 mg by mouth At Bedtime       prazosin (MINIPRESS) 1 MG capsule Take 1 capsule by mouth nightly as needed       sildenafil (REVATIO) 20 MG tablet TAKE 3-5 (60-100MG) TABLETS BY MOUTH 1 HOUR PRIOR TO SEXUAL ACTIVITY       STIMULANT LAXATIVE 8.6-50 MG tablet TAKE 1 TABLET BY MOUTH 2 TIMES DAILY 120 tablet 1     zolpidem (AMBIEN) 10 MG tablet Take 10 mg by mouth At Bedtime       No Known Allergies    Review of Systems     OBJECTIVE:     /72   Pulse 73   Temp 97.3  F (36.3  C)   Resp 20   Ht 1.727 m (5' 8\")   Wt 122.9 kg (271 lb)   SpO2 97%   BMI 41.21 kg/m    Body mass index is 41.21 kg/m .  Physical Exam  Constitutional:       Appearance: Normal appearance.   Neurological:      Mental Status: He is alert.      Comments: Physical examination was somewhat inconsistent.  He has trouble with flexion extension of his knees and hips.  Can barely lift his legs against any mild resistance.  Gait is pretty much normal " except initially when he started walking he did have to lean against the wall to steady himself.  Patient does have hyperreflexia at the knees.  No clonus.   Psychiatric:      Comments: Speech is tangential.  He jumps from one subject to another.  Speech is regular rate though.  Able to get back on track easily.         Diagnostic Test Results:  Labs reviewed from the recent ER visit unremarkable    ASSESSMENT/PLAN:         (F20.0) Paranoid schizophrenia (H)  (primary encounter diagnosis)      (R29.699) Weakness of both lower extremities      Reason ER report recommends either MRI versus EMGs.  To rule out neuropathy.  Physical exam was somewhat inconsistent.  I am not sure whether this is more psychiatric in origin versus a neuropathy.  We will proceed with an EMG/neurology consult.      Ervin Hassan MD  [facility] AND [facility]

## 2021-09-04 ENCOUNTER — TRANSFERRED RECORDS (OUTPATIENT)
Dept: HEALTH INFORMATION MANAGEMENT | Facility: OTHER | Age: 46
End: 2021-09-04

## 2021-09-04 ENCOUNTER — NURSE TRIAGE (OUTPATIENT)
Dept: NURSING | Facility: CLINIC | Age: 46
End: 2021-09-04

## 2021-09-04 ENCOUNTER — HOSPITAL ENCOUNTER (EMERGENCY)
Facility: OTHER | Age: 46
Discharge: ANOTHER HOSPITAL | End: 2021-09-04
Attending: STUDENT IN AN ORGANIZED HEALTH CARE EDUCATION/TRAINING PROGRAM | Admitting: STUDENT IN AN ORGANIZED HEALTH CARE EDUCATION/TRAINING PROGRAM
Payer: COMMERCIAL

## 2021-09-04 ENCOUNTER — APPOINTMENT (OUTPATIENT)
Dept: CT IMAGING | Facility: OTHER | Age: 46
End: 2021-09-04
Attending: STUDENT IN AN ORGANIZED HEALTH CARE EDUCATION/TRAINING PROGRAM
Payer: COMMERCIAL

## 2021-09-04 VITALS
DIASTOLIC BLOOD PRESSURE: 93 MMHG | HEART RATE: 76 BPM | OXYGEN SATURATION: 97 % | SYSTOLIC BLOOD PRESSURE: 150 MMHG | TEMPERATURE: 97.2 F | RESPIRATION RATE: 18 BRPM | WEIGHT: 271 LBS | BODY MASS INDEX: 41.21 KG/M2

## 2021-09-04 DIAGNOSIS — R20.0 BILATERAL LEG NUMBNESS: ICD-10-CM

## 2021-09-04 LAB
ALBUMIN SERPL-MCNC: 4 G/DL (ref 3.5–5.7)
ALP SERPL-CCNC: 65 U/L (ref 34–104)
ALT SERPL W P-5'-P-CCNC: 26 U/L (ref 7–52)
AMPHETAMINES UR QL: NOT DETECTED
ANION GAP SERPL CALCULATED.3IONS-SCNC: 6 MMOL/L (ref 3–14)
AST SERPL W P-5'-P-CCNC: 20 U/L (ref 13–39)
BARBITURATES UR QL SCN: NOT DETECTED
BASOPHILS # BLD AUTO: 0 10E3/UL (ref 0–0.2)
BASOPHILS NFR BLD AUTO: 1 %
BENZODIAZ UR QL SCN: NOT DETECTED
BILIRUB SERPL-MCNC: 0.4 MG/DL (ref 0.3–1)
BUN SERPL-MCNC: 8 MG/DL (ref 7–25)
BUPRENORPHINE UR QL: NOT DETECTED
CALCIUM SERPL-MCNC: 9.4 MG/DL (ref 8.6–10.3)
CANNABINOIDS UR QL: NOT DETECTED
CHLORIDE BLD-SCNC: 104 MMOL/L (ref 98–107)
CO2 SERPL-SCNC: 30 MMOL/L (ref 21–31)
COCAINE UR QL SCN: NOT DETECTED
CREAT SERPL-MCNC: 1.05 MG/DL (ref 0.7–1.3)
CRP SERPL HS-MCNC: 2.8 MG/L
D-METHAMPHET UR QL: NOT DETECTED
EOSINOPHIL # BLD AUTO: 0.2 10E3/UL (ref 0–0.7)
EOSINOPHIL NFR BLD AUTO: 3 %
ERYTHROCYTE [DISTWIDTH] IN BLOOD BY AUTOMATED COUNT: 11.9 % (ref 10–15)
ERYTHROCYTE [SEDIMENTATION RATE] IN BLOOD BY WESTERGREN METHOD: 11 MM/HR (ref 0–15)
GFR SERPL CREATININE-BSD FRML MDRD: 85 ML/MIN/1.73M2
GLUCOSE BLD-MCNC: 102 MG/DL (ref 70–105)
HCT VFR BLD AUTO: 38.5 % (ref 40–53)
HGB BLD-MCNC: 13.6 G/DL (ref 13.3–17.7)
HIV 1+2 AB+HIV1P24 AG SERPLBLD IA.RAPID: NON REACTIVE
HIV 1+2 AB+HIV1P24 AG SERPLBLD IA.RAPID: NON REACTIVE
HIV INTERPRETATION: NORMAL
HOLD SPECIMEN: NORMAL
IMM GRANULOCYTES # BLD: 0 10E3/UL
IMM GRANULOCYTES NFR BLD: 1 %
LYMPHOCYTES # BLD AUTO: 1.3 10E3/UL (ref 0.8–5.3)
LYMPHOCYTES NFR BLD AUTO: 24 %
MCH RBC QN AUTO: 30.8 PG (ref 26.5–33)
MCHC RBC AUTO-ENTMCNC: 35.3 G/DL (ref 31.5–36.5)
MCV RBC AUTO: 87 FL (ref 78–100)
METHADONE UR QL SCN: NOT DETECTED
MONOCYTES # BLD AUTO: 0.5 10E3/UL (ref 0–1.3)
MONOCYTES NFR BLD AUTO: 9 %
NEUTROPHILS # BLD AUTO: 3.3 10E3/UL (ref 1.6–8.3)
NEUTROPHILS NFR BLD AUTO: 62 %
NRBC # BLD AUTO: 0 10E3/UL
NRBC BLD AUTO-RTO: 0 /100
OPIATES UR QL SCN: NOT DETECTED
OXYCODONE UR QL SCN: NOT DETECTED
PCP UR QL SCN: NOT DETECTED
PLATELET # BLD AUTO: 171 10E3/UL (ref 150–450)
POTASSIUM BLD-SCNC: 3.9 MMOL/L (ref 3.5–5.1)
PROPOXYPH UR QL: NOT DETECTED
PROT SERPL-MCNC: 6.4 G/DL (ref 6.4–8.9)
RBC # BLD AUTO: 4.41 10E6/UL (ref 4.4–5.9)
SARS-COV-2 RNA RESP QL NAA+PROBE: NEGATIVE
SODIUM SERPL-SCNC: 140 MMOL/L (ref 134–144)
TRICYCLICS UR QL SCN: NOT DETECTED
VIT B12 SERPL-MCNC: 237 PG/ML (ref 180–914)
WBC # BLD AUTO: 5.3 10E3/UL (ref 4–11)

## 2021-09-04 PROCEDURE — 87806 HIV AG W/HIV1&2 ANTB W/OPTIC: CPT | Performed by: STUDENT IN AN ORGANIZED HEALTH CARE EDUCATION/TRAINING PROGRAM

## 2021-09-04 PROCEDURE — U0003 INFECTIOUS AGENT DETECTION BY NUCLEIC ACID (DNA OR RNA); SEVERE ACUTE RESPIRATORY SYNDROME CORONAVIRUS 2 (SARS-COV-2) (CORONAVIRUS DISEASE [COVID-19]), AMPLIFIED PROBE TECHNIQUE, MAKING USE OF HIGH THROUGHPUT TECHNOLOGIES AS DESCRIBED BY CMS-2020-01-R: HCPCS | Performed by: STUDENT IN AN ORGANIZED HEALTH CARE EDUCATION/TRAINING PROGRAM

## 2021-09-04 PROCEDURE — 80053 COMPREHEN METABOLIC PANEL: CPT | Performed by: STUDENT IN AN ORGANIZED HEALTH CARE EDUCATION/TRAINING PROGRAM

## 2021-09-04 PROCEDURE — 85652 RBC SED RATE AUTOMATED: CPT | Performed by: STUDENT IN AN ORGANIZED HEALTH CARE EDUCATION/TRAINING PROGRAM

## 2021-09-04 PROCEDURE — 96374 THER/PROPH/DIAG INJ IV PUSH: CPT | Performed by: STUDENT IN AN ORGANIZED HEALTH CARE EDUCATION/TRAINING PROGRAM

## 2021-09-04 PROCEDURE — 81003 URINALYSIS AUTO W/O SCOPE: CPT | Performed by: STUDENT IN AN ORGANIZED HEALTH CARE EDUCATION/TRAINING PROGRAM

## 2021-09-04 PROCEDURE — 36415 COLL VENOUS BLD VENIPUNCTURE: CPT | Performed by: STUDENT IN AN ORGANIZED HEALTH CARE EDUCATION/TRAINING PROGRAM

## 2021-09-04 PROCEDURE — 72131 CT LUMBAR SPINE W/O DYE: CPT

## 2021-09-04 PROCEDURE — 82607 VITAMIN B-12: CPT | Performed by: STUDENT IN AN ORGANIZED HEALTH CARE EDUCATION/TRAINING PROGRAM

## 2021-09-04 PROCEDURE — 86780 TREPONEMA PALLIDUM: CPT | Performed by: STUDENT IN AN ORGANIZED HEALTH CARE EDUCATION/TRAINING PROGRAM

## 2021-09-04 PROCEDURE — 80306 DRUG TEST PRSMV INSTRMNT: CPT | Performed by: STUDENT IN AN ORGANIZED HEALTH CARE EDUCATION/TRAINING PROGRAM

## 2021-09-04 PROCEDURE — 86618 LYME DISEASE ANTIBODY: CPT | Performed by: STUDENT IN AN ORGANIZED HEALTH CARE EDUCATION/TRAINING PROGRAM

## 2021-09-04 PROCEDURE — 99285 EMERGENCY DEPT VISIT HI MDM: CPT | Performed by: STUDENT IN AN ORGANIZED HEALTH CARE EDUCATION/TRAINING PROGRAM

## 2021-09-04 PROCEDURE — 99285 EMERGENCY DEPT VISIT HI MDM: CPT | Mod: 25 | Performed by: STUDENT IN AN ORGANIZED HEALTH CARE EDUCATION/TRAINING PROGRAM

## 2021-09-04 PROCEDURE — 86141 C-REACTIVE PROTEIN HS: CPT | Performed by: STUDENT IN AN ORGANIZED HEALTH CARE EDUCATION/TRAINING PROGRAM

## 2021-09-04 PROCEDURE — 85025 COMPLETE CBC W/AUTO DIFF WBC: CPT | Performed by: STUDENT IN AN ORGANIZED HEALTH CARE EDUCATION/TRAINING PROGRAM

## 2021-09-04 PROCEDURE — 250N000011 HC RX IP 250 OP 636: Performed by: STUDENT IN AN ORGANIZED HEALTH CARE EDUCATION/TRAINING PROGRAM

## 2021-09-04 PROCEDURE — C9803 HOPD COVID-19 SPEC COLLECT: HCPCS | Performed by: STUDENT IN AN ORGANIZED HEALTH CARE EDUCATION/TRAINING PROGRAM

## 2021-09-04 RX ORDER — ONDANSETRON 2 MG/ML
4 INJECTION INTRAMUSCULAR; INTRAVENOUS ONCE
Status: COMPLETED | OUTPATIENT
Start: 2021-09-04 | End: 2021-09-04

## 2021-09-04 RX ADMIN — ONDANSETRON 4 MG: 2 INJECTION INTRAMUSCULAR; INTRAVENOUS at 17:39

## 2021-09-04 NOTE — TELEPHONE ENCOUNTER
Can hardly walk and leg is numb    Feels that his symptoms are getting worse    States that the groin is numb and is has woken up with episodes of incontinence. These are new symptoms.    Marilou Belcher RN  [place] Nurse Advisor  2:22 PM  9/4/2021      COVID 19 Nurse Triage Plan/Patient Instructions    Please be aware that novel coronavirus (COVID-19) may be circulating in the community. If you develop symptoms such as fever, cough, or SOB or if you have concerns about the presence of another infection including coronavirus (COVID-19), please contact your health care provider or visit [email or web address].     Disposition/Instructions    ED Visit recommended. Follow protocol based instructions.     Bring Your Own Device:  Please also bring your smart device(s) (smart phones, tablets, laptops) and their charging cables for your personal use and to communicate with your care team during your visit.    Thank you for taking steps to prevent the spread of this virus.  o Limit your contact with others.  o Wear a simple mask to cover your cough.  o Wash your hands well and often.    Resources    [facility]: About COVID-19: [email or web address]    CDC: What to Do If You're Sick: www.cdc.gov/coronavirus/2019-ncov/about/steps-when-sick.html    CDC: Ending Home Isolation: www.cdc.gov/coronavirus/2019-ncov/hcp/disposition-in-home-patients.html     CDC: Caring for Someone: www.cdc.gov/coronavirus/2019-ncov/if-you-are-sick/care-for-someone.html     [facility]: Interim Guidance for Hospital Discharge to Home: www.health.[facility].mn.us/diseases/coronavirus/hcp/hospdischarge.pdf    [facility] clinical trials (COVID-19 research studies): clinicalaffairs.[facility]-clinical-trials     Below are the COVID-19 hotlines at the [place] Department of Health ([facility]). Interpreters are available.   o For health questions: Call 837-151-2237 or 1-727.780.4633 (7 a.m. to 7 p.m.)  o For questions about schools  and childcare: Call 091-923-4220 or 1-747.117.9938 (7 a.m. to 7 p.m.)                           Reason for Disposition    Numbness in groin or rectal area (i.e., loss of sensation)    Additional Information    Negative: Passed out (i.e., lost consciousness, collapsed and was not responding)    Negative: Shock suspected (e.g., cold/pale/clammy skin, too weak to stand, low BP, rapid pulse)    Negative: Sounds like a life-threatening emergency to the triager    Negative: [1] SEVERE back pain (e.g., excruciating) AND [2] sudden onset AND [3] age > 60    Negative: [1] Unable to urinate (or only a few drops) > 4 hours AND [2] bladder feels very full (e.g., palpable bladder or strong urge to urinate)    Negative: [1] Loss of bladder or bowel control (urine or bowel incontinence; wetting self, leaking stool) AND [2] new onset    Protocols used: BACK PAIN-A-AH

## 2021-09-04 NOTE — ED PROVIDER NOTES
History     Chief Complaint   Patient presents with     Back Pain     Numbness     HPI    Tenzin Ca is a 46 year old male with past medical histoyr of pschosis, hypertriglyceridemia, depression, schizophrenia, left knee arthroscopy, obesity who presents with back pain.     He was seen  for right leg numbness from his leg to this toes without weakness on 8/13/2021 by Dr. Pereira in this emergency department.  His d-dimer was, ultrasound was negative.  Therew as low concern for CNS pathology, peripheral nerve injury.  On 8/31 he was seen by provider for bilateral lower extremity numbness.  EMG was ordered, patient states he has a neurologist appointment on 9/16/2021    Today patient states he has been having 2 weeks of numbness up into his umbilical area, states this is circumferential and goes down to his toes.  Denies any alcohol, smoking or drugs.  In addition he has some low back pain, that he describes as achy and nonradiating.  He has not had any weight loss, night sweats, or change in his appetite.  He states he feels weak in his lower extremities, but is able to walk with assistance by grabbing onto things.  Denies any trauma or falls.  He did not do anything to improve his symptoms, here given worsening numbness and difficulty walking.    Allergies:  No Known Allergies    Problem List:    Patient Active Problem List    Diagnosis Date Noted     Weakness of both lower extremities 09/01/2021     Priority: Medium     Sebaceous cyst 05/26/2021     Priority: Medium     Skin nodule 05/26/2021     Priority: Medium     Morbid obesity (H) 05/12/2021     Priority: Medium     Non-intractable vomiting with nausea 05/12/2021     Priority: Medium     Vitamin D deficiency 12/26/2018     Priority: Medium     Slow transit constipation 02/22/2018     Priority: Medium     Hypertriglyceridemia 02/20/2018     Priority: Medium     Major depressive disorder, recurrent episode (H) 02/20/2018     Priority: Medium      Psoriasis 02/20/2018     Priority: Medium     Schizophrenia (H) 02/20/2018     Priority: Medium     Overview:   hears voices       Torn medial meniscus 02/20/2018     Priority: Medium     Obesity 02/21/2017     Priority: Medium     S/P left knee arthroscopy 07/05/2016     Priority: Medium     Psychosis (H) 07/02/2015     Priority: Medium     Erectile dysfunction 04/23/2014     Priority: Medium     Delayed ejaculation 06/03/2013     Priority: Medium        Past Medical History:    Past Medical History:   Diagnosis Date     Mental disorder      Personal history of other mental and behavioral disorders        Past Surgical History:    Past Surgical History:   Procedure Laterality Date     ARTHROSCOPY KNEE      Left Knee Arthroscopy and Partial Meniscectomy and Chondroplasty       Family History:    Family History   Problem Relation Age of Onset     Substance Abuse Mother         Alcohol/Drug     Alcoholism Mother         Alcoholism     Substance Abuse Brother         Alcohol/Drug     Alcoholism Brother         Alcoholism     Substance Abuse Sister         Alcohol/Drug     Alcoholism Sister         Alcoholism       Social History:  Marital Status:  Single [1]  Social History     Tobacco Use     Smoking status: Never Smoker     Smokeless tobacco: Never Used   Vaping Use     Vaping Use: Never used   Substance Use Topics     Alcohol use: No     Alcohol/week: 0.0 standard drinks     Drug use: No        Medications:    ARIPiprazole (ABILIFY) 2 MG tablet  ARIPiprazole (ABILIFY) 20 MG tablet  clonazePAM (KLONOPIN) 0.5 MG tablet  haloperidol (HALDOL) 0.5 MG tablet  hydrOXYzine (ATARAX) 50 MG tablet  ibuprofen (ADVIL/MOTRIN) 200 MG tablet  OXcarbazepine (TRILEPTAL) 300 MG tablet  OXcarbazepine (TRILEPTAL) 600 MG tablet  prazosin (MINIPRESS) 1 MG capsule  sildenafil (REVATIO) 20 MG tablet  STIMULANT LAXATIVE 8.6-50 MG tablet  zolpidem (AMBIEN) 10 MG tablet          Review of Systems  All systems reviewed and other than pertinent  positives and negatives in HPI, all other systems are negaive      Physical Exam        Vitals: There were no vitals taken for this visit.  Constitutional: awake, NAD  Eyes: No conjunctival injection and normal lids, PERRL, EOMI  ENT: external nose and ears atraumatic  Neck: Symmetric, trachea midline, Supple  CV: RRR, no murmurs appreciated.  Pulm: Unlabored respiratory effort, good air movement, CTAB, no w/c/r appreciated.  GI: Soft, nontender, nondistended.  No rebound or gaurding.  MSK: No deformities.  No cyanosis.  -No signs of external back trauma or step-offs mild midline tenderness on the lower lumbar and sacral spine  Neuro: AOx4, normal speech, following commands, grossly symmetric strength in all extremities.  Cranial nerves III-XII grossly intact.   -5/5 strength to hip flexion, knee extension, plantar and dorsiflexion  - exam consistent with possible saddle anesthesia  Skin: warm & dry, no rashes or lesions  Psych: Appropriate mood & affect.  Rectal : normal rectal tone, no appreciable external lesions      Assessments & Plan (with Medical Decision Making)     Tenzin Ca is a 46 year old male with past medical histoyr of pschosis, hypertriglyceridemia, depression, schizophrenia, left knee arthroscopy, obesity who presents with back pain and lower extremity numbness.       ED Course  ED Course as of Sep 04 1622   Sat Sep 04, 2021   1548 WBC: 5.3   1548 Hemoglobin: 13.6   1548 Platelet Count: 171   1548 [facility] Neurology - Dr. Elmore  Accepting patient along with hospitalist. They do have beds at [place].      1551 CT lumbar spine w/o contrast    IMPRESSION: Multi level annular bulging.      At L5-S1 there are posterolateral osteophytes which impinge on the  neural foramina of the L5 nerve roots worse on the left than the right      1607 Sed Rate: 11   1607 HIV-1/HIV-2 Antibody: Non Reactive   1607 HIV1 P24 Antigen: Non Reactive       /93. HR 76. Temp 97.2.  RR 18.  SpO2 97%. Secondary exam  revealed adequate strength in the lower extremities to hip flexion, knee extension, knee flexion, plantar and dorsi flexion.  Possible saddle anesthesia.  Normal rectal tone.  Subjective numbness from the umbilicus downwards.  White blood cell count of 5.3, hemoglobin 13.6.  ESR 11.  HIV negative.  CT of the lumbar spine revealed L5-S1 posterior lateral osteophytes which impinge on the neuroforamina L5 nerve roots worse on the left than on the right.  Certainly this is a lot for his symptoms of initial right lower extremity numbness, but his progressive lower extremity numbness is concerning for myelitis such as transverse myelitis, abscess, MS, sarcoidosis, Lyme, atypical gbs, compression syndrome/conus medullaris, etc. Unfortunately we do not have MRI at this facility to evaluate his spine. He has no shortness of breath or difficulty breathing at this time or prior.  No prodromal illness.   Consulted with neurology add [facility], Dr. Elmore recommended urgent transfer.  Patient was in agreement with this plan.  Transfer via BLS, COVID-19 and other tests pending below.  At this time my concern for bacterial infectious etiologies are low.  Deferring antibiotics at this time.      Plan  - HIV combo negative  - ESR/CRP - negative  - Vitamin B12 - 237 - normal  - Quantiferon-TB - pending  - Lyme Cookie - pending   - Trepenemia Abs w/ reflex rpr and titer pending  - UDS pending  - COVID19 pending  - Consult to neurology at [facility] - Dr. Elmore will need MRI C-T-L emergently. recommend transfer.  Hospitalist in agreement to plan.     Impression  Hx of Schizophrenia  Hx of psychosis  Bilateral lower extremity numbness  Numbness from the umbilical down  Back pain at the low back    Disposition  Transfer to OSH    I have reviewed the nursing notes.    I have reviewed the findings, diagnosis, plan and need for follow up with the patient.  Jomar Marcial MD  Emergency Medicine   9/4/2021   [facility]     Aggie,  Boris HERRERA MD  09/04/21 [id number]

## 2021-09-04 NOTE — ED TRIAGE NOTES
"ED Nursing Triage Note (General)   ________________________________    Tenzin Ca is a 46 year old Male that presents to triage via private vehicle with complaints of back pain and numbness.  Patient states numbness from his waste down, including his genitals.  Patient states recent falls due to numbness.  Patient states numbness began 2 weeks ago and states it has gotten progressively worse.  patient was seen in the ED when symptoms initially, however, does not recall his dx.  Patient states he was seen by his primary Wednesday and was referred to another provider from [place] who specializes in neurology. Patient states intermittent loss of bladder function and states, \"sometimes i'll pee and im not even aware that im going.  Sometimes when I dry myself off it comes out and I dont even know it\". Patient denies hx of diabetes or hx of neuropathy.   Significant symptoms had onset 2 weeks ago.  Patient appears alert behavior.  GCS-15  Airway: intact  Breathing noted as Normal  Action taken: 3      PRE HOSPITAL PRIOR LIVING SITUATION-home  "

## 2021-09-05 LAB
ALBUMIN UR-MCNC: NEGATIVE MG/DL
ALT SERPL-CCNC: 31 IU/L (ref 6–40)
APPEARANCE UR: CLEAR
AST SERPL-CCNC: 23 IU/L (ref 10–40)
BILIRUB UR QL STRIP: NEGATIVE
COLOR UR AUTO: NORMAL
CREATININE (EXTERNAL): 1.03 MG/DL (ref 0.7–1.2)
GFR ESTIMATED (EXTERNAL): >60 ML/MIN/1.73M2
GLUCOSE (EXTERNAL): 144 MG/DL (ref 70–99)
GLUCOSE UR STRIP-MCNC: NEGATIVE MG/DL
HGB UR QL STRIP: NEGATIVE
KETONES UR STRIP-MCNC: NEGATIVE MG/DL
LEUKOCYTE ESTERASE UR QL STRIP: NEGATIVE
NITRATE UR QL: NEGATIVE
PH UR STRIP: 7.5 [PH] (ref 5–9)
POTASSIUM (EXTERNAL): 3.8 MEQ/L (ref 3.4–5.1)
SP GR UR STRIP: 1.01 (ref 1–1.03)
T PALLIDUM AB SER QL: NONREACTIVE
UROBILINOGEN UR STRIP-MCNC: NORMAL MG/DL

## 2021-09-07 LAB — B BURGDOR IGG+IGM SER QL: 0.04

## 2021-09-07 NOTE — RESULT ENCOUNTER NOTE
Final result for Lyme Disease Cookie with reflex to WB Serum is NEGATIVE.    No change in treatment per [facility] ED Lab Result Lyme Disease protocol.

## 2021-09-09 ENCOUNTER — TRANSFERRED RECORDS (OUTPATIENT)
Dept: HEALTH INFORMATION MANAGEMENT | Facility: OTHER | Age: 46
End: 2021-09-09

## 2021-09-12 ENCOUNTER — HOSPITAL ENCOUNTER (EMERGENCY)
Facility: OTHER | Age: 46
Discharge: HOME | End: 2021-09-12
Attending: FAMILY MEDICINE | Admitting: FAMILY MEDICINE
Payer: COMMERCIAL

## 2021-09-12 VITALS
HEART RATE: 82 BPM | BODY MASS INDEX: 39.4 KG/M2 | SYSTOLIC BLOOD PRESSURE: 163 MMHG | OXYGEN SATURATION: 96 % | TEMPERATURE: 96 F | DIASTOLIC BLOOD PRESSURE: 91 MMHG | HEIGHT: 68 IN | RESPIRATION RATE: 16 BRPM | WEIGHT: 260 LBS

## 2021-09-12 DIAGNOSIS — R23.3 BRUISING, SPONTANEOUS: ICD-10-CM

## 2021-09-12 LAB
BASOPHILS # BLD AUTO: 0 10E3/UL (ref 0–0.2)
BASOPHILS NFR BLD AUTO: 0 %
EOSINOPHIL # BLD AUTO: 0.1 10E3/UL (ref 0–0.7)
EOSINOPHIL NFR BLD AUTO: 1 %
ERYTHROCYTE [DISTWIDTH] IN BLOOD BY AUTOMATED COUNT: 12 % (ref 10–15)
HCT VFR BLD AUTO: 39.2 % (ref 40–53)
HGB BLD-MCNC: 13.7 G/DL (ref 13.3–17.7)
IMM GRANULOCYTES # BLD: 0 10E3/UL
IMM GRANULOCYTES NFR BLD: 1 %
LYMPHOCYTES # BLD AUTO: 1.2 10E3/UL (ref 0.8–5.3)
LYMPHOCYTES NFR BLD AUTO: 17 %
MCH RBC QN AUTO: 30.9 PG (ref 26.5–33)
MCHC RBC AUTO-ENTMCNC: 34.9 G/DL (ref 31.5–36.5)
MCV RBC AUTO: 88 FL (ref 78–100)
MONOCYTES # BLD AUTO: 0.7 10E3/UL (ref 0–1.3)
MONOCYTES NFR BLD AUTO: 9 %
NEUTROPHILS # BLD AUTO: 5.1 10E3/UL (ref 1.6–8.3)
NEUTROPHILS NFR BLD AUTO: 72 %
NRBC # BLD AUTO: 0 10E3/UL
NRBC BLD AUTO-RTO: 0 /100
PLATELET # BLD AUTO: 180 10E3/UL (ref 150–450)
RBC # BLD AUTO: 4.44 10E6/UL (ref 4.4–5.9)
WBC # BLD AUTO: 7.1 10E3/UL (ref 4–11)

## 2021-09-12 PROCEDURE — 99283 EMERGENCY DEPT VISIT LOW MDM: CPT | Performed by: FAMILY MEDICINE

## 2021-09-12 PROCEDURE — 36415 COLL VENOUS BLD VENIPUNCTURE: CPT | Performed by: FAMILY MEDICINE

## 2021-09-12 PROCEDURE — 85025 COMPLETE CBC W/AUTO DIFF WBC: CPT | Performed by: FAMILY MEDICINE

## 2021-09-12 ASSESSMENT — MIFFLIN-ST. JEOR: SCORE: 2038.85

## 2021-09-12 ASSESSMENT — ENCOUNTER SYMPTOMS
APPETITE CHANGE: 0
ABDOMINAL PAIN: 0
FEVER: 0
ACTIVITY CHANGE: 0
COLOR CHANGE: 0
NECK PAIN: 0
SHORTNESS OF BREATH: 0

## 2021-09-12 NOTE — ED PROVIDER NOTES
History     Chief Complaint   Patient presents with     Abdomen bruise     HPI  Tenzin Ca is a 46 year old male history of morbid obesity, schizophrenia who recently underwent a cervical spine fusion on 9/4/2021.  He presents to the emergency room complaining of new onset of bruising on his abdomen that he woke up this morning with.  He is very concerned about this.  He states he also feels some numbness and occasional shocky feelings in his lower extremities which has been a long standing problem especially in his right leg.  He has a follow-up appointment with neurology on 9/16 to evaluate this further, but he was also told by neurology after the surgery that if his symptoms were related to his neck it might take some time to resolve.  His biggest concern is that the new onset of bruising.      Allergies:  No Known Allergies    Problem List:    Patient Active Problem List    Diagnosis Date Noted     Weakness of both lower extremities 09/01/2021     Priority: Medium     Sebaceous cyst 05/26/2021     Priority: Medium     Skin nodule 05/26/2021     Priority: Medium     Morbid obesity (H) 05/12/2021     Priority: Medium     Non-intractable vomiting with nausea 05/12/2021     Priority: Medium     Vitamin D deficiency 12/26/2018     Priority: Medium     Slow transit constipation 02/22/2018     Priority: Medium     Hypertriglyceridemia 02/20/2018     Priority: Medium     Major depressive disorder, recurrent episode (H) 02/20/2018     Priority: Medium     Psoriasis 02/20/2018     Priority: Medium     Schizophrenia (H) 02/20/2018     Priority: Medium     Overview:   hears voices       Torn medial meniscus 02/20/2018     Priority: Medium     Obesity 02/21/2017     Priority: Medium     S/P left knee arthroscopy 07/05/2016     Priority: Medium     Psychosis (H) 07/02/2015     Priority: Medium     Erectile dysfunction 04/23/2014     Priority: Medium     Delayed ejaculation 06/03/2013     Priority: Medium        Past  "Medical History:    Past Medical History:   Diagnosis Date     Mental disorder      Personal history of other mental and behavioral disorders        Past Surgical History:    Past Surgical History:   Procedure Laterality Date     ARTHROSCOPY KNEE      Left Knee Arthroscopy and Partial Meniscectomy and Chondroplasty       Family History:    Family History   Problem Relation Age of Onset     Substance Abuse Mother         Alcohol/Drug     Alcoholism Mother         Alcoholism     Substance Abuse Brother         Alcohol/Drug     Alcoholism Brother         Alcoholism     Substance Abuse Sister         Alcohol/Drug     Alcoholism Sister         Alcoholism       Social History:  Marital Status:  Single [1]  Social History     Tobacco Use     Smoking status: Never Smoker     Smokeless tobacco: Never Used   Vaping Use     Vaping Use: Never used   Substance Use Topics     Alcohol use: No     Alcohol/week: 0.0 standard drinks     Drug use: No        Medications:    ARIPiprazole (ABILIFY) 2 MG tablet  ARIPiprazole (ABILIFY) 20 MG tablet  clonazePAM (KLONOPIN) 0.5 MG tablet  haloperidol (HALDOL) 0.5 MG tablet  hydrOXYzine (ATARAX) 50 MG tablet  ibuprofen (ADVIL/MOTRIN) 200 MG tablet  OXcarbazepine (TRILEPTAL) 300 MG tablet  OXcarbazepine (TRILEPTAL) 600 MG tablet  prazosin (MINIPRESS) 1 MG capsule  sildenafil (REVATIO) 20 MG tablet  STIMULANT LAXATIVE 8.6-50 MG tablet  zolpidem (AMBIEN) 10 MG tablet          Review of Systems   Constitutional: Negative for activity change, appetite change and fever.   Respiratory: Negative for shortness of breath.    Cardiovascular: Negative for chest pain.   Gastrointestinal: Negative for abdominal pain.   Musculoskeletal: Negative for neck pain.   Skin: Positive for rash. Negative for color change.   All other systems reviewed and are negative.      Physical Exam   BP: (!) 163/91  Pulse: 82  Temp: (!) 96  F (35.6  C)  Resp: 16  Height: 172.7 cm (5' 8\")  Weight: 117.9 kg (260 lb)  SpO2: 96 " %      Physical Exam  Vitals and nursing note reviewed.   Constitutional:       Appearance: Normal appearance.   HENT:      Head: Normocephalic and atraumatic.   Neck:      Comments: Patient is a neck brace.  This was removed and I looked at his anterior incision which is clean and dry.  There was dried blood on the edge but it otherwise looked totally normal.  No surrounding erythema or suggestion of infection.  Cardiovascular:      Rate and Rhythm: Normal rate and regular rhythm.      Heart sounds: Normal heart sounds.   Pulmonary:      Breath sounds: Normal breath sounds.   Abdominal:      Comments: Patient has multiple small bruises primarily on the right side and then a few on the left side of his abdomen.  They are nontender.  Abdomen is nontender and there is no obvious masses.   Skin:     General: Skin is warm and dry.      Capillary Refill: Capillary refill takes less than 2 seconds.   Neurological:      General: No focal deficit present.      Mental Status: He is alert.         ED Course   Patient seen and examined.  White cell count and hemoglobin are normal.  Platelet count is also normal at 180.  No obvious concerns are noted on exam.  Reassured patient that he should not be worried about these bruises.  He will follow-up with his primary as well as the surgeon as directed.  He also has a neurology appointment this week that he will keep.  He was comfortable with that plan.  Answered all questions the best of my ability.    Results for orders placed or performed during the hospital encounter of 09/12/21 (from the past 24 hour(s))   CBC with platelets differential    Narrative    The following orders were created for panel order CBC with platelets differential.  Procedure                               Abnormality         Status                     ---------                               -----------         ------                     CBC with platelets and d...[[id number]]  Abnormal            Final result                  Please view results for these tests on the individual orders.   CBC with platelets and differential   Result Value Ref Range    WBC Count 7.1 4.0 - 11.0 10e3/uL    RBC Count 4.44 4.40 - 5.90 10e6/uL    Hemoglobin 13.7 13.3 - 17.7 g/dL    Hematocrit 39.2 (L) 40.0 - 53.0 %    MCV 88 78 - 100 fL    MCH 30.9 26.5 - 33.0 pg    MCHC 34.9 31.5 - 36.5 g/dL    RDW 12.0 10.0 - 15.0 %    Platelet Count 180 150 - 450 10e3/uL    % Neutrophils 72 %    % Lymphocytes 17 %    % Monocytes 9 %    % Eosinophils 1 %    % Basophils 0 %    % Immature Granulocytes 1 %    NRBCs per 100 WBC 0 <1 /100    Absolute Neutrophils 5.1 1.6 - 8.3 10e3/uL    Absolute Lymphocytes 1.2 0.8 - 5.3 10e3/uL    Absolute Monocytes 0.7 0.0 - 1.3 10e3/uL    Absolute Eosinophils 0.1 0.0 - 0.7 10e3/uL    Absolute Basophils 0.0 0.0 - 0.2 10e3/uL    Absolute Immature Granulocytes 0.0 <=0.0 10e3/uL    Absolute NRBCs 0.0 10e3/uL       Medications - No data to display    Assessments & Plan (with Medical Decision Making)     I have reviewed the nursing notes.    I have reviewed the findings, diagnosis, plan and need for follow up with the patient.    Discharge Medication List as of 9/12/2021 10:39 AM          Final diagnoses:   Bruising, spontaneous       9/12/2021   [facility] MD  09/12/21 [id number]

## 2021-09-12 NOTE — ED TRIAGE NOTES
"ED Nursing Triage Note (General)   ________________________________    Tenzin Ca is a 46 year old Male that presents to triage private car  With history of waking up this morning and noticing a small bruise on his lower left abdomen. Pt arrived with a neck brace on stating that he just had surgery. Once pt was in room he stated he has also had numbness all over that started this morning. Pt states he has not fallen on his abdomen and his bruise does not hurt. Bruise is main complaint why pt is here.   BP (!) 163/91   Pulse 82   Temp (!) 96  F (35.6  C)   Resp 16   Ht 1.727 m (5' 8\")   Wt 117.9 kg (260 lb)   SpO2 96%   BMI 39.53 kg/m  t  Patient appears alert , in no acute distress., and cooperative behavior.    GCS Total = 15  Airway: intact  Breathing noted as Normal  Circulation Normal  Skin:  Normal      "

## 2021-09-13 ENCOUNTER — TELEPHONE (OUTPATIENT)
Dept: FAMILY MEDICINE | Facility: OTHER | Age: 46
End: 2021-09-13

## 2021-09-13 NOTE — TELEPHONE ENCOUNTER
Patient was ordered home care PT/OT s/p cervical Fusion from [place]. Dr. Hassan are you in agreement to home care orders?

## 2021-09-14 ENCOUNTER — TELEPHONE (OUTPATIENT)
Dept: FAMILY MEDICINE | Facility: OTHER | Age: 46
End: 2021-09-14

## 2021-09-14 PROCEDURE — G0180 MD CERTIFICATION HHA PATIENT: HCPCS | Performed by: FAMILY MEDICINE

## 2021-09-14 NOTE — TELEPHONE ENCOUNTER
Request for Home Care Physical Therapy orders as follows:    PT eval and treat date:  9/14/21    Continuation frequency =  2 x week x __4__ weeks                Effective date = 9/14/21    Interventions include:    Therapeutic Exercise  Therapeutic Activity  Gait/Balance/Dysfunction  Home Exercise Program  Home Safety Assessment        Therapist: Clara Rodgers PT  Please respond with .HOMECAREAGREE, if you are in agreement. Please sign with your comments and signature, if you are not in agreement.    Request for Home Care Occupational Therapy orders as follows:      OT eval and treat date:  9/14/21    Continuation frequency =  2 x week x _4___ weeks                 Effective date = 9/14/21    Interventions include:    Therapeutic Exercise  ADL training  Adaptive equipment  Home safety assessment  Home exercise program                                     For therapist:Marcy Ann OT  Please respond with .HOMECAREAGREE, if you are in agreement. Please sign with your comments and signature, if you are not in agreement.

## 2021-09-14 NOTE — TELEPHONE ENCOUNTER
"URGENT: per CMS best practice, response is requested within 24 hours.    Home Care regulation mandates that you are notified about drug discrepancies, interactions & contraindications. Response within a 24 hour timeframe is established by CMS as \"best practice\" for the delivery of home health care. Home Care is required to report if the 24 hour timeframe was met. The home health clinician will contact you again if this timeframe is not met or if the response does not address all concerns.       Situation:        The patient was admitted to [facility], after being discharged from [facility] on 09/11/2021. Upon admission, a med reconciliation was completed to identify any drug discrepancies, interactions or contraindications. Home Care's drug regime review has revealed significant medication issues.     You are being contacted for clarifying orders related to the medication issues.     Possible Moderate medication interactions:     Hydrocodone-acetaminophen/clonazepam (Norco-no refills)  Aripiprazole/oxcarbazepine  Aripiprazole/haloperidol     &     FYI: Patient reports no longer taking the hydroxyzine or the sildenafil        Recommendations:    Please evaluate this information and indicate below whether or not changes are required. A copy of the patient's drug interaction/contraindications report is available upon request.       CLINIC NURSE - If changes are made, please update the Epic medication profile.       Mariposa Cano RN on 9/14/2021 at 2:50 PM        "

## 2021-09-15 ENCOUNTER — TELEPHONE (OUTPATIENT)
Dept: FAMILY MEDICINE | Facility: OTHER | Age: 46
End: 2021-09-15

## 2021-09-16 NOTE — TELEPHONE ENCOUNTER
Haloperidol discontinued on Patient's medication list.  Christine Puri LPN............9/16/2021 10:15 AM

## 2021-09-16 NOTE — TELEPHONE ENCOUNTER
For your records:     Patient no longer taking haloperidol.     Mariposa Cano RN on 9/15/2021 at 8:33 PM

## 2021-09-20 ENCOUNTER — OFFICE VISIT (OUTPATIENT)
Dept: FAMILY MEDICINE | Facility: OTHER | Age: 46
End: 2021-09-20
Attending: FAMILY MEDICINE
Payer: COMMERCIAL

## 2021-09-20 VITALS
SYSTOLIC BLOOD PRESSURE: 146 MMHG | DIASTOLIC BLOOD PRESSURE: 88 MMHG | RESPIRATION RATE: 20 BRPM | TEMPERATURE: 97.1 F | HEIGHT: 68 IN | WEIGHT: 260 LBS | HEART RATE: 76 BPM | BODY MASS INDEX: 39.4 KG/M2 | OXYGEN SATURATION: 97 %

## 2021-09-20 DIAGNOSIS — M43.22 FUSION OF SPINE OF CERVICAL REGION: ICD-10-CM

## 2021-09-20 PROCEDURE — G0463 HOSPITAL OUTPT CLINIC VISIT: HCPCS

## 2021-09-20 PROCEDURE — 99213 OFFICE O/P EST LOW 20 MIN: CPT | Performed by: FAMILY MEDICINE

## 2021-09-20 ASSESSMENT — MIFFLIN-ST. JEOR: SCORE: 2038.85

## 2021-09-20 ASSESSMENT — PAIN SCALES - GENERAL: PAINLEVEL: NO PAIN (0)

## 2021-09-20 NOTE — PROGRESS NOTES
"  SUBJECTIVE:   Tenzin Ca is a 46 year old male who presents to clinic today for the following health issues: Follow-up surgery    Patient arrives here for wound check.  Patient had cervical spine fusion or leg weakness.  He reports his legs continue to be normal.  He does have follow-up neurosurgery appointment later this week.  Patient is concerned about infection        Patient Active Problem List    Diagnosis Date Noted     Fusion of spine of cervical region 09/20/2021     Priority: Medium     Weakness of both lower extremities 09/01/2021     Priority: Medium     Sebaceous cyst 05/26/2021     Priority: Medium     Skin nodule 05/26/2021     Priority: Medium     Morbid obesity (H) 05/12/2021     Priority: Medium     Non-intractable vomiting with nausea 05/12/2021     Priority: Medium     Vitamin D deficiency 12/26/2018     Priority: Medium     Slow transit constipation 02/22/2018     Priority: Medium     Hypertriglyceridemia 02/20/2018     Priority: Medium     Major depressive disorder, recurrent episode (H) 02/20/2018     Priority: Medium     Psoriasis 02/20/2018     Priority: Medium     Schizophrenia (H) 02/20/2018     Priority: Medium     Overview:   hears voices       Torn medial meniscus 02/20/2018     Priority: Medium     Obesity 02/21/2017     Priority: Medium     S/P left knee arthroscopy 07/05/2016     Priority: Medium     Psychosis (H) 07/02/2015     Priority: Medium     Erectile dysfunction 04/23/2014     Priority: Medium     Delayed ejaculation 06/03/2013     Priority: Medium       Review of Systems     OBJECTIVE:     BP (!) 146/88   Pulse 76   Temp 97.1  F (36.2  C)   Resp 20   Ht 1.727 m (5' 8\")   Wt 117.9 kg (260 lb)   SpO2 97%   BMI 39.53 kg/m    Body mass index is 39.53 kg/m .  Physical Exam  Skin:     Comments: Wound appears to be healing well.  Steri-Strips are in place.  No redness or discharge present   Neurological:      Mental Status: He is alert.         Diagnostic Test " Results:  none     ASSESSMENT/PLAN:         (M43.22) Fusion of spine of cervical region  Currently doing well.  Follow-up with neurosurgery.      Ervin Hassan MD  [facility]

## 2021-09-20 NOTE — NURSING NOTE
Patient presents today for follow up on his neck.    Medication Reconciliation Complete    Tracy Pena LPN  9/20/2021 11:15 AM

## 2021-09-24 ENCOUNTER — TELEPHONE (OUTPATIENT)
Dept: FAMILY MEDICINE | Facility: OTHER | Age: 46
End: 2021-09-24
Payer: COMMERCIAL

## 2021-09-24 DIAGNOSIS — Z47.89 UNSPECIFIED ORTHOPEDIC AFTERCARE: Primary | ICD-10-CM

## 2021-09-24 NOTE — TELEPHONE ENCOUNTER
Dr Hassan reviewed and completed the following home care or hospice form(s) for patient on 9/22/21   This covers the certification period effective 9/14/21 to 11/12/21.  Vivian Gomez LPN on 9/24/2021 at 8:14 AM

## 2021-10-03 ENCOUNTER — HEALTH MAINTENANCE LETTER (OUTPATIENT)
Age: 46
End: 2021-10-03

## 2021-11-15 NOTE — ED PROVIDER NOTES
History     Chief Complaint   Patient presents with     Dizziness     Fatigue     HPI  Tenzin Ca is a 43 year old male who is to the emergency room for evaluation of lightheadedness and fatigue.  He states that he has not been feeling himself for about the past week.  He states that he feels lightheaded with standing.  He states that he has intermittent occasional mild headaches but is not having any headache pain tonight at all.  He denies any focal neurologic symptoms.  States that he feels tired and fatigued.  He also states that his girlfriend would like for him to be checked for sexually transmitted disease.  He does not want an HIV or syphilis test.  He just wants the gonorrhea and Chlamydia test done on the urine.    Patient denies any fever, sweats, chills, URI symptoms, cough, chest pain, shortness of breath, abdominal pain, nausea, vomiting, diarrhea, dysuria, penile discharge or rash.    Problem List:    Patient Active Problem List    Diagnosis Date Noted     Viral syndrome 09/14/2018     Priority: Medium     Cough 03/29/2018     Priority: Medium     Vitamin B12 deficiency (non anemic) 02/22/2018     Priority: Medium     Slow transit constipation 02/22/2018     Priority: Medium     Hypertriglyceridemia 02/20/2018     Priority: Medium     Major depressive disorder, recurrent episode (H) 02/20/2018     Priority: Medium     Plica of knee 02/20/2018     Priority: Medium     Psoriasis 02/20/2018     Priority: Medium     Schizophrenia (H) 02/20/2018     Priority: Medium     Overview:   hears voices       Torn medial meniscus 02/20/2018     Priority: Medium     Obesity 02/21/2017     Priority: Medium     S/P left knee arthroscopy 07/05/2016     Priority: Medium     Chronic pain of left knee 04/07/2016     Priority: Medium     Psychosis (H) 07/02/2015     Priority: Medium     Erectile dysfunction 04/23/2014     Priority: Medium     Anal fissure 06/03/2013     Priority: Medium     Delayed ejaculation  "Pended Lidocaine Rx as per below request with \"for low back pain\" on sig    Routing refill request to provider for review/approval because:  Med is historical on med list     Zaria HAYS, Triage RN  [facility]     " 06/03/2013     Priority: Medium     Sprain of lumbar region 06/21/2012     Priority: Medium        Past Medical History:    Past Medical History:   Diagnosis Date     Mental disorder      Personal history of other mental and behavioral disorders        Past Surgical History:    Past Surgical History:   Procedure Laterality Date     ARTHROSCOPY KNEE      Left Knee Arthroscopy and Partial Meniscectomy and Chondroplasty       Family History:    Family History   Problem Relation Age of Onset     Substance Abuse Mother         Alcohol/Drug     Alcoholism Mother         Alcoholism     Substance Abuse Brother         Alcohol/Drug     Alcoholism Brother         Alcoholism     Substance Abuse Sister         Alcohol/Drug     Alcoholism Sister         Alcoholism       Social History:  Marital Status:  Single [1]  Social History     Tobacco Use     Smoking status: Never Smoker     Smokeless tobacco: Never Used   Substance Use Topics     Alcohol use: No     Alcohol/week: 0.0 oz     Drug use: No     Comment: Drug use: No        Medications:      ARIPiprazole (ABILIFY) 20 MG tablet   cholecalciferol (D 5000) 5000 UNITS CAPS   CLONAZEPAM PO   hydrOXYzine 100 MG CAPS   omega 3 1000 MG CAPS   Omega-3 Fatty Acids (FISH OIL PO)   OXcarbazepine (TRILEPTAL) 300 MG tablet   OXcarbazepine (TRILEPTAL) 600 MG tablet   senna-docusate (SENOKOT-S;PERICOLACE) 8.6-50 MG per tablet   zolpidem (AMBIEN) 10 MG tablet         Review of Systems   Constitutional: Positive for fatigue. Negative for appetite change, chills, diaphoresis and fever.   HENT: Negative for congestion, rhinorrhea and sore throat.    Eyes: Negative for visual disturbance.   Respiratory: Negative for cough and shortness of breath.    Cardiovascular: Negative for chest pain, palpitations and leg swelling.   Gastrointestinal: Negative for abdominal pain, diarrhea, nausea and vomiting.   Genitourinary: Negative for discharge, dysuria and penile pain.   Musculoskeletal: Negative for  "back pain and neck pain.   Skin: Negative for rash.   Neurological: Positive for weakness, light-headedness and headaches. Negative for syncope, speech difficulty and numbness.   Hematological: Does not bruise/bleed easily.   All other systems reviewed and are negative.      Physical Exam   BP: (!) 155/98  Pulse: 88  Heart Rate: 84  Temp: 98.2  F (36.8  C)  Resp: 16  Height: 172.7 cm (5' 8\")  Weight: 115.7 kg (255 lb)  SpO2: 99 %      Physical Exam   Constitutional: He is oriented to person, place, and time. He appears well-developed and well-nourished. No distress.   HENT:   Head: Normocephalic and atraumatic.   Right Ear: External ear normal.   Left Ear: External ear normal.   Nose: Nose normal.   Mouth/Throat: Oropharynx is clear and moist.   Eyes: Conjunctivae and EOM are normal. Pupils are equal, round, and reactive to light.   Neck: Normal range of motion. Neck supple.   Cardiovascular: Normal rate, regular rhythm, normal heart sounds and intact distal pulses.   No murmur heard.  Pulmonary/Chest: Effort normal and breath sounds normal.   Abdominal: Soft. Bowel sounds are normal. He exhibits no distension. There is no tenderness.   Musculoskeletal: Normal range of motion. He exhibits no edema or tenderness.   Lymphadenopathy:     He has no cervical adenopathy.   Neurological: He is alert and oriented to person, place, and time. He has normal strength. No cranial nerve deficit or sensory deficit. GCS eye subscore is 4. GCS verbal subscore is 5. GCS motor subscore is 6.   Skin: Skin is warm. Capillary refill takes less than 2 seconds. No rash noted. He is not diaphoretic. No erythema.   Nursing note and vitals reviewed.      ED Course     Procedures       EKG Interpretation:      Interpreted by Paul Darnell MD  Time reviewed: 2216  Symptoms at time of EKG: Near Syncope   Rhythm: normal sinus   Rate: 82  Axis: Left Axis Deviation  Ectopy: none  Conduction: LVH with QRS widening  ST Segments/ T Waves: No ST-T " wave changes  Q Waves: none  Comparison to prior: No old EKG available    Clinical Impression: NSR, LVH, Leftward axis, and no acute changes    Critical Care time:  none      Results for orders placed or performed during the hospital encounter of 12/14/18 (from the past 24 hour(s))   CBC with platelets differential   Result Value Ref Range    WBC 5.9 4.0 - 11.0 10e9/L    RBC Count 4.67 4.4 - 5.9 10e12/L    Hemoglobin 13.6 13.3 - 17.7 g/dL    Hematocrit 38.9 (L) 40.0 - 53.0 %    MCV 83 78 - 100 fl    MCH 29.1 26.5 - 33.0 pg    MCHC 35.0 31.5 - 36.5 g/dL    RDW 11.9 10.0 - 15.0 %    Platelet Count 178 150 - 450 10e9/L    Diff Method Automated Method     % Neutrophils 52.9 %    % Lymphocytes 34.9 %    % Monocytes 8.1 %    % Eosinophils 3.0 %    % Basophils 0.8 %    % Immature Granulocytes 0.3 %    Absolute Neutrophil 3.1 1.6 - 8.3 10e9/L    Absolute Lymphocytes 2.1 0.8 - 5.3 10e9/L    Absolute Monocytes 0.5 0.0 - 1.3 10e9/L    Absolute Eosinophils 0.2 0.0 - 0.7 10e9/L    Absolute Basophils 0.1 0.0 - 0.2 10e9/L    Abs Immature Granulocytes 0.0 0 - 0.4 10e9/L   Comprehensive metabolic panel   Result Value Ref Range    Sodium 137 134 - 144 mmol/L    Potassium 3.5 3.5 - 5.1 mmol/L    Chloride 104 98 - 107 mmol/L    Carbon Dioxide 27 21 - 31 mmol/L    Anion Gap 6 3 - 14 mmol/L    Glucose 133 (H) 70 - 105 mg/dL    Urea Nitrogen 13 7 - 25 mg/dL    Creatinine 1.01 0.70 - 1.30 mg/dL    GFR Estimate 81 >60 mL/min/1.7m2    GFR Estimate If Black >90 >60 mL/min/1.7m2    Calcium 9.0 8.6 - 10.3 mg/dL    Bilirubin Total 0.3 0.3 - 1.0 mg/dL    Albumin 4.0 3.5 - 5.7 g/dL    Protein Total 6.8 6.4 - 8.9 g/dL    Alkaline Phosphatase 61 34 - 104 U/L    ALT 30 7 - 52 U/L    AST 25 13 - 39 U/L   Troponin I   Result Value Ref Range    Troponin I ES <0.030 0.000 - 0.034 ug/L   *UA reflex to Microscopic   Result Value Ref Range    Color Urine Yellow     Appearance Urine Clear     Glucose Urine Negative NEG^Negative mg/dL    Bilirubin Urine  Negative NEG^Negative    Ketones Urine Negative NEG^Negative mg/dL    Specific Gravity Urine >1.030 (H) 1.000 - 1.030    Blood Urine Negative NEG^Negative    pH Urine 6.5 5.0 - 9.0 pH    Protein Albumin Urine Negative NEG^Negative mg/dL    Urobilinogen Urine 0.2 0.2 - 1.0 EU/dL    Nitrite Urine Negative NEG^Negative    Leukocyte Esterase Urine Negative NEG^Negative    Source Urine    CT Head w/o Contrast    Narrative    PROCEDURE: CT HEAD W/O CONTRAST   12/14/2018 10:39 PM    HISTORY:Male, age,  43 years, , , Syncope, recurrent    COMPARISON:None    TECHNIQUE: CT of the brain without contrast.    FINDINGS: Ventricles and sulci are normal in size and shape. Gray and  white matter demonstrate normal differentiation.    There is no evidence of mass, mass effect or midline shift. No  evidence of acute hemorrhage.    The bones are unremarkable. No fracture.       Impression    IMPRESSION:   No acute intracranial abnormality.  No acute fracture. Normal  examination.    ZOILA MARTINEZ MD       Medications   0.9% sodium chloride BOLUS (1,000 mLs Intravenous New Bag 12/14/18 4100)       Assessments & Plan (with Medical Decision Making)     I have reviewed the nursing notes.    I had a discussion with the patient and the family regarding the symptoms, exam, laboratory, CT Scan, EKG results, diagnosis, and plan.  It was given 1 L normal saline IV and is feeling better.  The patient reports that he was primarily worried about having abnormal labs are having an STD.  Patient does not have any symptoms of there are no secondary signs of infection regarding his urine therefore I am not treating him.  We discussed the fact that he will need to follow-up with his primary care physician next week on Monday or Tuesday to check those results.  I answered all questions to the best of my ability.    The patient voiced understanding of the plan, was agreeable, and had no further questions or concerns. Advised to return for any worsening  symptoms.      I have reviewed the findings, diagnosis, plan and need for follow up with the patient.       Medication List      There are no discharge medications for this visit.         Final diagnoses:   Lightheadedness   Near syncope       12/14/2018   [facility]     Paul Darnell MD  12/14/18 [id number]

## 2021-11-19 DIAGNOSIS — K59.01 SLOW TRANSIT CONSTIPATION: ICD-10-CM

## 2021-11-19 RX ORDER — DOCUSATE SODIUM AND SENNOSIDES 8.6; 5 MG/1; MG/1
TABLET, FILM COATED ORAL
Qty: 120 TABLET | Refills: 0 | Status: SHIPPED | OUTPATIENT
Start: 2021-11-19 | End: 2022-01-24

## 2021-11-21 ENCOUNTER — NURSE TRIAGE (OUTPATIENT)
Dept: NURSING | Facility: CLINIC | Age: 46
End: 2021-11-21
Payer: COMMERCIAL

## 2021-11-22 ENCOUNTER — OFFICE VISIT (OUTPATIENT)
Dept: FAMILY MEDICINE | Facility: OTHER | Age: 46
End: 2021-11-22
Attending: FAMILY MEDICINE
Payer: COMMERCIAL

## 2021-11-22 VITALS
OXYGEN SATURATION: 94 % | HEART RATE: 54 BPM | WEIGHT: 267.8 LBS | TEMPERATURE: 97.7 F | RESPIRATION RATE: 20 BRPM | SYSTOLIC BLOOD PRESSURE: 104 MMHG | BODY MASS INDEX: 40.72 KG/M2 | DIASTOLIC BLOOD PRESSURE: 68 MMHG

## 2021-11-22 DIAGNOSIS — R20.0 NUMBNESS: Primary | ICD-10-CM

## 2021-11-22 PROBLEM — R29.898 WEAKNESS OF BOTH LOWER EXTREMITIES: Status: RESOLVED | Noted: 2021-09-01 | Resolved: 2021-11-22

## 2021-11-22 PROCEDURE — 99213 OFFICE O/P EST LOW 20 MIN: CPT | Performed by: FAMILY MEDICINE

## 2021-11-22 PROCEDURE — G0463 HOSPITAL OUTPT CLINIC VISIT: HCPCS

## 2021-11-22 RX ORDER — PREDNISONE 20 MG/1
20 TABLET ORAL DAILY
Qty: 10 TABLET | Refills: 0 | Status: SHIPPED | OUTPATIENT
Start: 2021-11-22 | End: 2021-12-06

## 2021-11-22 RX ORDER — CLONAZEPAM 1 MG/1
TABLET ORAL
COMMUNITY
Start: 2021-11-19

## 2021-11-22 RX ORDER — ARIPIPRAZOLE 10 MG/1
10 TABLET ORAL EVERY MORNING
COMMUNITY
Start: 2021-11-19 | End: 2022-02-22

## 2021-11-22 ASSESSMENT — PAIN SCALES - GENERAL: PAINLEVEL: NO PAIN (0)

## 2021-11-22 NOTE — NURSING NOTE
Patient here for back numbness from the mid to low back. This started 2 1/2 weeks ago. He states he had a fall around that time, landing very hard. He is wondering if that numbness was from that. He had neck surgery in [place] 09/09/2021.  Medication Reconciliation: complete.    Jaquelin Aguilera, MIGUEL  11/22/2021 2:02 PM

## 2021-11-22 NOTE — TELEPHONE ENCOUNTER
"Has been having numbness in his middle abdomen and middle back regions for about 2 1/2 weeks.   Symptoms are:    When stands up numbness get worse    Is anxious about that way he is feeling (taken Clonazepam)    Could not feel is girlfriend scratch his back today.    When he is walking is stomach get \"as hard as a rock\"    Unable to feel water from shower on his back.     Denies pain, no numbness in legs,  Or fever.  Had BM on 11/21.  9/9 had cervical fusion.  Patient will call [facility] on Monday to set up an appointment.      Beata Goldman RN, MA  [facility] Triage Nurse Advisor    \Reason for Disposition    [1] Numbness or tingling on both sides of body AND [2] is a new symptom present < 24 hours    Additional Information    Negative: [1] SEVERE weakness (i.e., unable to walk or barely able to walk, requires support) AND [2] new onset or worsening    Negative: [1] Weakness (i.e., paralysis, loss of muscle strength) of the face, arm / hand, or leg / foot on one side of the body AND [2] sudden onset AND [3] present now    Negative: [1] Numbness (i.e., loss of sensation) of the face, arm / hand, or leg / foot on one side of the body AND [2] sudden onset AND [3] present now    Negative: [1] Loss of speech or garbled speech AND [2] sudden onset AND [3] present now    Negative: Difficult to awaken or acting confused (e.g., disoriented, slurred speech)    Negative: Sounds like a life-threatening emergency to the triager    Negative: Headache  (and neurologic deficit)    Negative: [1] Back pain AND [2] numbness (loss of sensation) in groin or rectal area    Negative: [1] Unable to urinate (or only a few drops) > 4 hours AND [2] bladder feels very full (e.g., palpable bladder or strong urge to urinate)    Negative: [1] Loss of bladder or bowel control (urine or bowel incontinence; wetting self, leaking stool) AND [2] new onset    Negative: [1] Weakness (i.e., paralysis, loss of muscle strength) of the face, " arm / hand, or leg / foot on one side of the body AND [2] sudden onset AND [3] brief (now gone)    Negative: [1] Numbness (i.e., loss of sensation) of the face, arm / hand, or leg / foot on one side of the body AND [2] sudden onset AND [3] brief (now gone)    Negative: [1] Loss of speech or garbled speech AND [2] sudden onset AND [3] brief (now gone)    Negative: Bell's palsy suspected (i.e., weakness on only one side of the face, developing over hours to days, no other symptoms)    Negative: Patient sounds very sick or weak to the triager    Negative: Neck pain (and neurologic deficit)    Negative: Back pain (and neurologic deficit)    Negative: [1] Weakness of the face, arm / hand, or leg / foot on one side of the body AND [2] gradual onset (e.g., days to weeks) AND [3] present now    Negative: [1] Loss of speech or garbled speech AND [2] gradual onset (e.g., days to weeks) AND [3] present now    Negative: [1] Tingling (e.g., pins and needles) of the face, arm / hand, or leg / foot on one side of the body AND [2] present now    Negative: [1] Numbness (i.e., loss of sensation) of the face, arm / hand, or leg / foot on one side of the body AND [2] gradual onset (e.g., days to weeks) AND [3] present now    Negative: [1] Loss of speech or garbled speech AND [2] is a chronic symptom (recurrent or ongoing AND present > 4 weeks)    Negative: [1] Weakness of arm / hand, or leg / foot AND [2] is a chronic symptom (recurrent or ongoing AND present > 4 weeks)    Negative: [1] Numbness or tingling in one or both hands AND [2] is a chronic symptom (recurrent or ongoing AND present > 4 weeks)    Negative: [1] Numbness or tingling in one or both feet AND [2] is a chronic symptom (recurrent or ongoing AND present > 4 weeks)    Negative: [1] Numbness or tingling on both sides of body AND [2] is a new symptom present > 24 hours    Negative: [1] Tingling in hand (e.g., pins and needles) AND [2] after prolonged laying on arm AND [3]   brief (now gone)    Negative: [1] Tingling in foot (e.g., pins and needles) AND [2] after prolonged sitting AND [3] brief (now gone)    Negative: [1] Bumped elbow AND [2] brief (now gone) numbness (or tingling or burning) in hand and fingers    Protocols used: NEUROLOGIC DEFICIT-A-AH

## 2021-11-23 NOTE — PROGRESS NOTES
SUBJECTIVE:   Tenzin Ca is a 46 year old male who presents to clinic today for the following health issues: Back numbness    Patient arrives here for back numbness.  Is been going on for about 2-1/2 weeks.  He denies any injury.  The mid to lower back.  He reports his girlfriend scratched him and he states he can feel it.  Extends onto the front and around the abdomen.  He reports laying on his side produces more numbness.  He denies any weakness in his legs no changes in bowel or bladder habits.  Reports no rash        Patient Active Problem List    Diagnosis Date Noted     Numbness 11/22/2021     Priority: Medium     Fusion of spine of cervical region 09/20/2021     Priority: Medium     Sebaceous cyst 05/26/2021     Priority: Medium     Skin nodule 05/26/2021     Priority: Medium     Morbid obesity (H) 05/12/2021     Priority: Medium     Non-intractable vomiting with nausea 05/12/2021     Priority: Medium     Vitamin D deficiency 12/26/2018     Priority: Medium     Slow transit constipation 02/22/2018     Priority: Medium     Hypertriglyceridemia 02/20/2018     Priority: Medium     Major depressive disorder, recurrent episode (H) 02/20/2018     Priority: Medium     Psoriasis 02/20/2018     Priority: Medium     Schizophrenia (H) 02/20/2018     Priority: Medium     Overview:   hears voices       Torn medial meniscus 02/20/2018     Priority: Medium     Obesity 02/21/2017     Priority: Medium     S/P left knee arthroscopy 07/05/2016     Priority: Medium     Psychosis (H) 07/02/2015     Priority: Medium     Erectile dysfunction 04/23/2014     Priority: Medium     Delayed ejaculation 06/03/2013     Priority: Medium     Past Medical History:   Diagnosis Date     Mental disorder     Disability related to mental illness     Personal history of other mental and behavioral disorders     2016      No Known Allergies    Review of Systems     OBJECTIVE:     /68   Pulse 54   Temp 97.7  F (36.5  C)   Resp 20   Wt  121.5 kg (267 lb 12.8 oz)   SpO2 94%   BMI 40.72 kg/m    Body mass index is 40.72 kg/m .  Physical Exam  Skin:     General: Skin is warm.      Comments: Skin exam was unremarkable.  He reports not sensitive to touch.  Extends around in his abdomen.  Around his umbilicus.     Neurological:      General: No focal deficit present.      Mental Status: He is alert.      Motor: No weakness.      Gait: Gait normal.      Comments: Patient can stand on his toes and heels equally.  Gait is normal.         Diagnostic Test Results:  none     ASSESSMENT/PLAN:         (R20.0) Numbness  (primary encounter diagnosis)  Comment: Etiology is unclear.  Currently no rash that would suggest an early herpes zoster.  We will try prednisone.  Follow-up in 2 weeks for recheck  Plan: predniSONE (DELTASONE) 20 MG tablet             Ervin Hassan MD  [facility]

## 2021-11-30 ENCOUNTER — TELEPHONE (OUTPATIENT)
Dept: FAMILY MEDICINE | Facility: OTHER | Age: 46
End: 2021-11-30
Payer: COMMERCIAL

## 2021-11-30 NOTE — TELEPHONE ENCOUNTER
Called and spoke with patient he will stop medication and keep his appointment for Monday. Jaquelin Aguilera LPN .......................11/30/2021  10:29 AM]

## 2021-11-30 NOTE — TELEPHONE ENCOUNTER
Patient says he has two days left of the prednisone and he says there numbness in the back and stomach has increased. He can not sleep at night because of the numbness. He is scheduled to see you on Monday for follow up. Please advise. Jaquelin Aguilera LPN .......................11/30/2021  10:01 AM

## 2021-12-06 ENCOUNTER — OFFICE VISIT (OUTPATIENT)
Dept: FAMILY MEDICINE | Facility: OTHER | Age: 46
End: 2021-12-06
Attending: FAMILY MEDICINE
Payer: COMMERCIAL

## 2021-12-06 VITALS
OXYGEN SATURATION: 97 % | WEIGHT: 270.4 LBS | HEIGHT: 68 IN | BODY MASS INDEX: 40.98 KG/M2 | TEMPERATURE: 97.5 F | SYSTOLIC BLOOD PRESSURE: 120 MMHG | DIASTOLIC BLOOD PRESSURE: 78 MMHG | HEART RATE: 74 BPM | RESPIRATION RATE: 20 BRPM

## 2021-12-06 DIAGNOSIS — G89.29 CHRONIC LOW BACK PAIN WITHOUT SCIATICA, UNSPECIFIED BACK PAIN LATERALITY: Primary | ICD-10-CM

## 2021-12-06 DIAGNOSIS — M54.50 CHRONIC LOW BACK PAIN WITHOUT SCIATICA, UNSPECIFIED BACK PAIN LATERALITY: Primary | ICD-10-CM

## 2021-12-06 PROCEDURE — 99213 OFFICE O/P EST LOW 20 MIN: CPT | Performed by: FAMILY MEDICINE

## 2021-12-06 PROCEDURE — G0463 HOSPITAL OUTPT CLINIC VISIT: HCPCS

## 2021-12-06 RX ORDER — CYCLOBENZAPRINE HCL 10 MG
10 TABLET ORAL 3 TIMES DAILY PRN
Qty: 30 TABLET | Refills: 1 | Status: ON HOLD | OUTPATIENT
Start: 2021-12-06 | End: 2022-06-16

## 2021-12-06 ASSESSMENT — MIFFLIN-ST. JEOR: SCORE: 2086.03

## 2021-12-06 ASSESSMENT — PAIN SCALES - GENERAL: PAINLEVEL: EXTREME PAIN (8)

## 2021-12-06 NOTE — NURSING NOTE
Patient here for low back pain feels a pulling and tightness. He took 8 of the 10 days of prednisone. Medication Reconciliation: complete.    Jaquelin Aguilera LPN  12/6/2021 2:36 PM

## 2021-12-07 NOTE — PROGRESS NOTES
"  SUBJECTIVE:   Tenzin Ca is a 46 year old male who presents to clinic today for the following health issues: Low back pain    Patient arrives here for low back pain.  The numbness and operative pain is since resolved.  He reports his low back pain seems to have tightening feeling.  Spasming.  Does not radiate.  Feels like his symptoms not.  He is not complaining of any pain going down the legs.  Twisting seems to make it worse.  No injury.  Started shortly after his surgery to his neck he denies any bowel or bladder problems        Patient Active Problem List    Diagnosis Date Noted     Numbness 11/22/2021     Priority: Medium     Fusion of spine of cervical region 09/20/2021     Priority: Medium     Sebaceous cyst 05/26/2021     Priority: Medium     Skin nodule 05/26/2021     Priority: Medium     Morbid obesity (H) 05/12/2021     Priority: Medium     Non-intractable vomiting with nausea 05/12/2021     Priority: Medium     Vitamin D deficiency 12/26/2018     Priority: Medium     Slow transit constipation 02/22/2018     Priority: Medium     Hypertriglyceridemia 02/20/2018     Priority: Medium     Major depressive disorder, recurrent episode (H) 02/20/2018     Priority: Medium     Psoriasis 02/20/2018     Priority: Medium     Schizophrenia (H) 02/20/2018     Priority: Medium     Overview:   hears voices       Torn medial meniscus 02/20/2018     Priority: Medium     Obesity 02/21/2017     Priority: Medium     S/P left knee arthroscopy 07/05/2016     Priority: Medium     Psychosis (H) 07/02/2015     Priority: Medium     Erectile dysfunction 04/23/2014     Priority: Medium     Delayed ejaculation 06/03/2013     Priority: Medium       Review of Systems     OBJECTIVE:     /78   Pulse 74   Temp 97.5  F (36.4  C)   Resp 20   Ht 1.727 m (5' 8\")   Wt 122.7 kg (270 lb 6.4 oz)   SpO2 97%   BMI 41.11 kg/m    Body mass index is 41.11 kg/m .  Physical Exam  Constitutional:       Appearance: Normal appearance. "   Skin:     General: Skin is warm.   Neurological:      Mental Status: He is alert.      Comments: Gait was normal.  Pain reproducible on palpation to his lumbar musculature         Diagnostic Test Results:  none     ASSESSMENT/PLAN:         (M54.50,  G89.29) Chronic low back pain without sciatica, unspecified back pain laterality  (primary encounter diagnosis)  Comment: Start  Plan: cyclobenzaprine (FLEXERIL) 10 MG tablet      Physical therapy if no improvement**        Ervin Hassan MD  [facility]

## 2021-12-12 ENCOUNTER — TELEPHONE (OUTPATIENT)
Dept: NURSING | Facility: CLINIC | Age: 46
End: 2021-12-12
Payer: COMMERCIAL

## 2021-12-13 NOTE — TELEPHONE ENCOUNTER
"Tenzin reports an ongoing feeling of heaviness to the entire midsection of his body - circumferentially. He reports that he has been having his feeling since Cervical spinal surgery on 9/9/21.    \"Feels like inside of my body is really heavy\"   When he stands up and walks, he feels like there's something inside his body that's pushing him down.    No pain. No numbness  No difficulty breathing  No difficulty urinating or having a bowel movement.  He feels that he can walk normally    In early and mid Nov, he had reported numbness to the midsection of his back. He no longer has the numbness.    He has a follow up appointment with Neurology on Wed, 12/15 and will discuss this at that time            "

## 2021-12-28 ENCOUNTER — OFFICE VISIT (OUTPATIENT)
Dept: FAMILY MEDICINE | Facility: OTHER | Age: 46
End: 2021-12-28
Attending: FAMILY MEDICINE
Payer: COMMERCIAL

## 2021-12-28 VITALS
SYSTOLIC BLOOD PRESSURE: 118 MMHG | BODY MASS INDEX: 40.26 KG/M2 | OXYGEN SATURATION: 97 % | TEMPERATURE: 98.9 F | HEART RATE: 84 BPM | RESPIRATION RATE: 16 BRPM | DIASTOLIC BLOOD PRESSURE: 68 MMHG | WEIGHT: 264.8 LBS

## 2021-12-28 DIAGNOSIS — G89.29 CHRONIC MIDLINE LOW BACK PAIN WITHOUT SCIATICA: ICD-10-CM

## 2021-12-28 DIAGNOSIS — M54.50 CHRONIC MIDLINE LOW BACK PAIN WITHOUT SCIATICA: ICD-10-CM

## 2021-12-28 DIAGNOSIS — J02.0 STREPTOCOCCAL SORE THROAT: Primary | ICD-10-CM

## 2021-12-28 DIAGNOSIS — R20.0 NUMBNESS: ICD-10-CM

## 2021-12-28 PROCEDURE — C9803 HOPD COVID-19 SPEC COLLECT: HCPCS | Performed by: FAMILY MEDICINE

## 2021-12-28 PROCEDURE — U0005 INFEC AGEN DETEC AMPLI PROBE: HCPCS | Mod: ZL | Performed by: FAMILY MEDICINE

## 2021-12-28 PROCEDURE — G0463 HOSPITAL OUTPT CLINIC VISIT: HCPCS

## 2021-12-28 PROCEDURE — 99214 OFFICE O/P EST MOD 30 MIN: CPT | Performed by: FAMILY MEDICINE

## 2021-12-28 ASSESSMENT — PAIN SCALES - GENERAL: PAINLEVEL: SEVERE PAIN (6)

## 2021-12-28 NOTE — NURSING NOTE
Patient here for back pain left arm pain and stomach pain all with a tingly sensation since his most recent back surgery. Medication Reconciliation: complete.    Jaquelin Aguilera LPN  12/28/2021 1:45 PM

## 2021-12-29 PROBLEM — G89.29 CHRONIC MIDLINE LOW BACK PAIN WITHOUT SCIATICA: Status: ACTIVE | Noted: 2021-12-29

## 2021-12-29 PROBLEM — M54.50 CHRONIC MIDLINE LOW BACK PAIN WITHOUT SCIATICA: Status: ACTIVE | Noted: 2021-12-29

## 2021-12-29 NOTE — PROGRESS NOTES
SUBJECTIVE:   Tenzin Ca is a 46 year old adult who presents to clinic today for the following health issues: Multiple issues    Patient arrives here for multiple issues.  He complains of a sore throat x2 days.  Left arm pain and tingling when he lays on his stomach.  If he should lay on his back the tingling resolves.  He did see neurosurgery and was given a clean bill of health the 15th.  He reports tingling all over his body.  He is concerned his left arm is lost circulation.  States at times he can feel anything in his left arm.  He is concerned he may have slept on it.  He is concerned about lower back pain.  Symptoms have been going numb on about 1 week.        Patient Active Problem List    Diagnosis Date Noted     Chronic midline low back pain without sciatica 12/29/2021     Priority: Medium     Numbness 11/22/2021     Priority: Medium     Fusion of spine of cervical region 09/20/2021     Priority: Medium     Sebaceous cyst 05/26/2021     Priority: Medium     Skin nodule 05/26/2021     Priority: Medium     Morbid obesity (H) 05/12/2021     Priority: Medium     Non-intractable vomiting with nausea 05/12/2021     Priority: Medium     Vitamin D deficiency 12/26/2018     Priority: Medium     Slow transit constipation 02/22/2018     Priority: Medium     Hypertriglyceridemia 02/20/2018     Priority: Medium     Major depressive disorder, recurrent episode (H) 02/20/2018     Priority: Medium     Psoriasis 02/20/2018     Priority: Medium     Schizophrenia (H) 02/20/2018     Priority: Medium     Overview:   hears voices       Torn medial meniscus 02/20/2018     Priority: Medium     Obesity 02/21/2017     Priority: Medium     S/P left knee arthroscopy 07/05/2016     Priority: Medium     Psychosis (H) 07/02/2015     Priority: Medium     Erectile dysfunction 04/23/2014     Priority: Medium     Delayed ejaculation 06/03/2013     Priority: Medium     Past Medical History:   Diagnosis Date     Mental disorder      Disability related to mental illness     Personal history of other mental and behavioral disorders     2016      Current Outpatient Medications   Medication Sig Dispense Refill     ARIPiprazole (ABILIFY) 10 MG tablet Take 10 mg by mouth every morning       ARIPiprazole (ABILIFY) 2 MG tablet Take 2 mg by mouth every morning       clonazePAM (KLONOPIN) 1 MG tablet TAKE 1 TABLET BY MOUTH 3 TIMES DAILY AS NEEDED FOR SEVERE ANXIETY AND VOICES       cyclobenzaprine (FLEXERIL) 10 MG tablet Take 1 tablet (10 mg) by mouth 3 times daily as needed for muscle spasms 30 tablet 1     OXcarbazepine (TRILEPTAL) 300 MG tablet Take 300 mg by mouth every morning       OXcarbazepine (TRILEPTAL) 600 MG tablet Take 600 mg by mouth At Bedtime       prazosin (MINIPRESS) 1 MG capsule Take 1 capsule by mouth nightly as needed       STOOL SOFTENER/LAXATIVE 50-8.6 MG tablet TAKE 1 TABLET BY MOUTH 2 TIMES DAILY 120 tablet 0     zolpidem (AMBIEN) 10 MG tablet Take 10 mg by mouth At Bedtime       No Known Allergies    Review of Systems     OBJECTIVE:     /68   Pulse 84   Temp 98.9  F (37.2  C)   Resp 16   Wt 120.1 kg (264 lb 12.8 oz)   SpO2 97%   BMI 40.26 kg/m    Body mass index is 40.26 kg/m .  Physical Exam  HENT:      Right Ear: Tympanic membrane normal.      Left Ear: Tympanic membrane normal.      Mouth/Throat:      Mouth: Mucous membranes are moist.      Pharynx: No oropharyngeal exudate or posterior oropharyngeal erythema.   Eyes:      Pupils: Pupils are equal, round, and reactive to light.   Cardiovascular:      Rate and Rhythm: Normal rate and regular rhythm.   Pulmonary:      Effort: Pulmonary effort is normal.   Musculoskeletal:         General: No swelling, tenderness or deformity. Normal range of motion.      Comments: Patient has good peripheral pulses.  Good capillary refill.  Strength is good upper extremities equal bilateral.  Putting flexion at the elbow wrist fingers   Neurological:      Mental Status: He is alert.          Diagnostic Test Results:  none     ASSESSMENT/PLAN:         (J02.0) sore throat  (primary encounter diagnosis)  Comment: Doubt streptococcal.  Plan: Symptomatic; Yes; 12/26/2021 COVID-19 Virus         (Coronavirus) by PCR       Observation    (R20.0) Numbness  Neuro exam is completely normal on his upper extremities.  Will monitor.    (M54.50,  G89.29) Chronic midline low back pain without sciatica  Physical therapy.      Ervin Hassan MD  [facility] AND \[facility]\""

## 2021-12-30 LAB — SARS-COV-2 RNA RESP QL NAA+PROBE: NEGATIVE

## 2022-01-07 DIAGNOSIS — K59.01 SLOW TRANSIT CONSTIPATION: ICD-10-CM

## 2022-01-10 RX ORDER — SENNOSIDES AND DOCUSATE SODIUM 8.6; 5 MG/1; MG/1
TABLET ORAL
Qty: 120 TABLET | Refills: 0 | OUTPATIENT
Start: 2022-01-10

## 2022-01-10 NOTE — TELEPHONE ENCOUNTER
[id number] sent Rx request for the following:      Requested Prescriptions   Pending Prescriptions Disp Refills     SM SENNA-S 8.6-50 MG tablet [Pharmacy Med Name: SENNA-S 50MG-8.6MG TABLET] 120 tablet 0     Sig: TAKE 1 TABLET BY MOUTH 2 TIMES DAILY          Unable to complete prescription refill per medication discontinued     Last Prescription Date:   3/19/2021  Last Fill Qty/Refills:         120, R-1  Last Office Visit:              12/28/2021   Future Office visit:           None     Maura Anderson RN on 1/10/2022 at 11:25 AM

## 2022-01-19 ENCOUNTER — OFFICE VISIT (OUTPATIENT)
Dept: UROLOGY | Facility: OTHER | Age: 47
End: 2022-01-19
Attending: UROLOGY
Payer: COMMERCIAL

## 2022-01-19 VITALS
SYSTOLIC BLOOD PRESSURE: 118 MMHG | DIASTOLIC BLOOD PRESSURE: 80 MMHG | HEART RATE: 81 BPM | BODY MASS INDEX: 40.66 KG/M2 | OXYGEN SATURATION: 96 % | WEIGHT: 267.4 LBS | RESPIRATION RATE: 16 BRPM

## 2022-01-19 DIAGNOSIS — N52.9 ERECTILE DYSFUNCTION, UNSPECIFIED ERECTILE DYSFUNCTION TYPE: Primary | ICD-10-CM

## 2022-01-19 PROCEDURE — 99213 OFFICE O/P EST LOW 20 MIN: CPT | Performed by: UROLOGY

## 2022-01-19 PROCEDURE — G0463 HOSPITAL OUTPT CLINIC VISIT: HCPCS

## 2022-01-19 RX ORDER — TADALAFIL 20 MG/1
TABLET ORAL
Qty: 20 TABLET | Refills: 11 | Status: SHIPPED | OUTPATIENT
Start: 2022-01-19 | End: 2022-06-09

## 2022-01-19 ASSESSMENT — PAIN SCALES - GENERAL: PAINLEVEL: MILD PAIN (2)

## 2022-01-19 NOTE — PROGRESS NOTES
Type of Visit  EST    Chief Complaint  Erectile dysfunction    HPI  Mr. Ca is a 46 year old male with history of schizophrenia who follows up with erectile dysfunction.  His ED issues started about 7 years ago.  He was using sildenafil successfully for about 2 years.  More recently he has noticed this has not been beneficial.  He presents today requesting to discuss alternative treatment options.  No significant changes in health since last visit.      Review of Systems  I personally reviewed the ROS with the patient.    Nursing Notes:   Radha Cano, LPN  1/19/2022  2:04 PM  Addendum  Pt presents to clinic for ED follow up    Review of Systems:    Weight loss:    No     Recent fever/chills:  No   Night sweats:   No  Current skin rash:  No   Recent hair loss:  No  Heat intolerance:  No   Cold intolerance:  No  Chest pain:   No   Palpitations:   No  Shortness of breath:  No   Wheezing:   No  Constipation:    No   Diarrhea:   No   Nausea:   No   Vomiting:   No   Kidney/side pain:  No   Back pain:   Yes  Frequent headaches:  No   Dizziness:     No  Leg swelling:   No   Calf pain:    No          Physical Exam  Vitals:    01/19/22 1414   BP: 118/80   BP Location: Right arm   Patient Position: Sitting   Cuff Size: Adult Large   Pulse: 81   Resp: 16   SpO2: 96%   Weight: 121.3 kg (267 lb 6.4 oz)     Constitutional: No acute distress.  Alert and cooperative   Head: NCAT  Eyes: Conjunctivae normal  Cardiovascular: Regular rate  Pulmonary/Chest: Respirations are even and non-labored bilaterally, no audible wheezing  Abdominal: Soft. No distension, tenderness, masses or guarding.   Neurological: A + O x 3.  Cranial Nerves II-XII grossly intact.  Extremities: STEVO x 4, Warm. No clubbing.  No cyanosis.    Skin: Pink, warm and dry.  No visible rashes noted.  Psychiatric:  Normal mood and affect  Back:  No left CVA tenderness.  No right CVA tenderness.  Genitourinary:  Nonpalpable bladder    Assessment  Mr. Ca is a  46 year old male with erectile dysfunction.  Diminishing response with sildenafil.  Discussed alternative treatment options and the patient would like to try Cialis.    He was informed of the most common side effects of Cialis such as headache, flushing and back pain    Plan  Start Cialis 10-20mg by mouth 2 hours prior to sexual activity.

## 2022-01-19 NOTE — NURSING NOTE
Pt presents to clinic for ED follow up    Review of Systems:    Weight loss:    No     Recent fever/chills:  No   Night sweats:   No  Current skin rash:  No   Recent hair loss:  No  Heat intolerance:  No   Cold intolerance:  No  Chest pain:   No   Palpitations:   No  Shortness of breath:  No   Wheezing:   No  Constipation:    No   Diarrhea:   No   Nausea:   No   Vomiting:   No   Kidney/side pain:  No   Back pain:   Yes  Frequent headaches:  No   Dizziness:     No  Leg swelling:   No   Calf pain:    No

## 2022-01-24 DIAGNOSIS — K59.01 SLOW TRANSIT CONSTIPATION: ICD-10-CM

## 2022-01-24 RX ORDER — AMOXICILLIN 250 MG
1 CAPSULE ORAL 2 TIMES DAILY
Qty: 180 TABLET | Refills: 3 | Status: SHIPPED | OUTPATIENT
Start: 2022-01-24 | End: 2023-09-29

## 2022-01-24 NOTE — TELEPHONE ENCOUNTER
[id number] sent Rx request for the following:      Requested Prescriptions   Pending Prescriptions Disp Refills     senna-docusate (STOOL SOFTENER/LAXATIVE) 8.6-50 MG tablet 120 tablet 0     Sig: Take 1 tablet by mouth 2 times daily   Last Prescription Date:   11/19/21  Last Fill Qty/Refills:         120, R-0    Last Office Visit:              12/28/21   Future Office visit:           None    Unable to complete prescription refill per RN Medication Refill Policy. Christine Palomino RN .............. 1/24/2022  1:12 PM

## 2022-02-07 ENCOUNTER — TELEPHONE (OUTPATIENT)
Dept: FAMILY MEDICINE | Facility: OTHER | Age: 47
End: 2022-02-07
Payer: COMMERCIAL

## 2022-02-07 NOTE — TELEPHONE ENCOUNTER
Notified patient that he needs to schedule an appointment to follow up with [facility] since pain is not getting any better. Transferred to scheduling  Julianna Jenkins LPN ....................  2/7/2022   8:41 AM

## 2022-02-07 NOTE — TELEPHONE ENCOUNTER
[facility]-patient states that he is in a lot of pain and would like direction on what to do about it (did offer an appointment) please call and advise    Thank You    Kacy Gann on 2/7/2022 at 8:07 AM

## 2022-02-08 ENCOUNTER — HOSPITAL ENCOUNTER (OUTPATIENT)
Dept: GENERAL RADIOLOGY | Facility: OTHER | Age: 47
End: 2022-02-08
Attending: FAMILY MEDICINE
Payer: COMMERCIAL

## 2022-02-08 ENCOUNTER — OFFICE VISIT (OUTPATIENT)
Dept: FAMILY MEDICINE | Facility: OTHER | Age: 47
End: 2022-02-08
Attending: FAMILY MEDICINE
Payer: COMMERCIAL

## 2022-02-08 VITALS — HEART RATE: 78 BPM | DIASTOLIC BLOOD PRESSURE: 80 MMHG | SYSTOLIC BLOOD PRESSURE: 122 MMHG | OXYGEN SATURATION: 98 %

## 2022-02-08 DIAGNOSIS — M54.50 CHRONIC BILATERAL LOW BACK PAIN WITHOUT SCIATICA: Primary | ICD-10-CM

## 2022-02-08 DIAGNOSIS — G89.29 CHRONIC BILATERAL LOW BACK PAIN WITHOUT SCIATICA: Primary | ICD-10-CM

## 2022-02-08 DIAGNOSIS — G89.29 CHRONIC BILATERAL LOW BACK PAIN WITHOUT SCIATICA: ICD-10-CM

## 2022-02-08 DIAGNOSIS — M54.50 CHRONIC BILATERAL LOW BACK PAIN WITHOUT SCIATICA: ICD-10-CM

## 2022-02-08 PROCEDURE — 99214 OFFICE O/P EST MOD 30 MIN: CPT | Performed by: FAMILY MEDICINE

## 2022-02-08 PROCEDURE — 72100 X-RAY EXAM L-S SPINE 2/3 VWS: CPT

## 2022-02-08 PROCEDURE — G0463 HOSPITAL OUTPT CLINIC VISIT: HCPCS

## 2022-02-08 PROCEDURE — G0463 HOSPITAL OUTPT CLINIC VISIT: HCPCS | Mod: 25

## 2022-02-08 RX ORDER — CYCLOBENZAPRINE HCL 10 MG
10 TABLET ORAL 3 TIMES DAILY PRN
Qty: 30 TABLET | Refills: 1 | Status: ON HOLD | OUTPATIENT
Start: 2022-02-08 | End: 2022-06-16

## 2022-02-08 ASSESSMENT — PAIN SCALES - GENERAL: PAINLEVEL: WORST PAIN (10)

## 2022-02-08 ASSESSMENT — PATIENT HEALTH QUESTIONNAIRE - PHQ9
SUM OF ALL RESPONSES TO PHQ QUESTIONS 1-9: 12
SUM OF ALL RESPONSES TO PHQ QUESTIONS 1-9: 12
10. IF YOU CHECKED OFF ANY PROBLEMS, HOW DIFFICULT HAVE THESE PROBLEMS MADE IT FOR YOU TO DO YOUR WORK, TAKE CARE OF THINGS AT HOME, OR GET ALONG WITH OTHER PEOPLE: VERY DIFFICULT

## 2022-02-08 NOTE — PATIENT INSTRUCTIONS
Patient Education     Back Care Tips     Caring for your back  These are things you can do to prevent a recurrence of acute back pain and to reduce symptoms from chronic back pain:    Stay at a healthy weight. If you are overweight, losing weight will help most types of back pain.    Exercise is an important part of recovery from most types of back pain. The muscles behind and in front of the spine support the back. This means strengthening both the back muscles and the abdominal muscles will provide better support for your spine.     Swimming and brisk walking are good overall exercises to improve your fitness level.    Practice safe lifting methods (see below).    Practice good posture when sitting, standing, and walking. Don't sit for a long time. This puts more stress on the lower back than standing or walking.    Wear quality shoes with good arch support. Foot and ankle alignment can affect back symptoms. Don't wear high heels.    Therapeutic massage can help relax the back muscles without stretching them.    During the first 24 to 72 hours after an acute injury or flare-up of chronic back pain, put an ice pack on the painful area for 20 minutes and then remove it for 20 minutes. Do thisover a period of 60 to 90 minutes, or several times a day. As a safety precaution, don't use a heating pad at bedtime. Sleeping on a heating pad can lead to skin burns or tissue damage.    You can alternate using ice and heat.  Medicines  Talk with your healthcare provider before using medicines, especially if you have other health problems or are taking other medicines.    You may use over-the-counter medicines, such as acetaminophen, ibuprofen, or naprosyn to control pain, unless your healthcare provider prescribed other pain medicine. Talk with your healthcare provider before taking any medicines if you have a chronic condition such as diabetes, liver or kidney disease, stomach ulcers, or digestive bleeding, or are taking  blood thinners.    Be careful if you are given prescription pain medicines, opioids, or medicine for muscle spasm. They can cause drowsiness, and affect your coordination, reflexes, and judgment. Don't drive or operate heavy machinery while taking these types of medicines. Take prescription pain medicine only as prescribed by your healthcare provider.  Lumbar stretch  This simple stretch will help relax muscle spasm and keep your back more limber. If exercise makes your back pain worse, don t do it.    Lie on your back with your knees bent and both feet on the ground.    Slowly raise your left knee to your chest as you flatten your lower back against the floor. Hold for 5 seconds.    Relax and repeat the exercise with your right knee.    Do 10 of these exercises for each leg.  Safe lifting method    Don t bend over at the waist to lift an object off the floor.  Instead, bend your knees and hips in a squat.     Keep your back and head upright    Hold the object close to your body, directly in front of you.    Straighten your legs to lift the object.     Lower the object to the floor in the reverse fashion.    If you must slide something across the floor, push it.    Posture tips  Sitting  Sit in chairs with straight backs or low-back support. Keep your knees lower than your hips, with your feet flat on the floor.  When driving, sit up straight. Adjust the seat forward so you are not leaning toward the steering wheel.  A small pillow or rolled towel behind your lower back may help if you are driving long distances.   Standing  When standing for long periods, shift most of your weight to one leg at a time. Switch legs every few minutes.   Sleeping  The best way to sleep is on your side with your knees bent. Put a low pillow under your head to support your neck in a neutral spine position. Don't use thick pillows that bend your neck to one side. Put a pillow between your legs to further relax your lower back. If you  sleep on your back, put pillows under your knees to support your legs in a slightly flexed position. Use a firm mattress. If your mattress sags, replace it, or use a 1/2-inch plywood board under the mattress to add support.  Follow-up care  Follow up with your healthcare provider, or as advised.  If X-rays, a CT scan or an MRI scan were taken, they may be reviewed by a radiologist. You will be told of any new findings that may affect your care.  Call 911  Call 911 if any of the following occur:    Trouble breathing    Confusion    Very drowsy    Fainting or loss of consciousness    Rapid or very slow heart rate    Loss of  bowel or bladder control  When to seek medical advice  Call your healthcare provider right away if any of the following occur:    Pain becomes worse or spreads to your arms or legs    Weakness or numbness in one or both arms or legs    Numbness in the groin area  Shayla last reviewed this educational content on 11/1/2019 2000-2021 [employer]. All rights reserved. This information is not intended as a substitute for professional medical care. Always follow your healthcare professional's instructions.           Patient Education     Back Spasm (No Trauma)    Spasm of the back muscles can occur after a sudden forceful twisting or bending such as in a car accident. A spasm can also happen after a simple awkward movement, or after lifting something heavy with poor body positioning. In any case, muscle spasm adds to the pain. Sleeping in an awkward position or on a poor quality mattress can also cause this. Some people respond to emotional stress by tensing the muscles of their back.  Pain that continues may need further assessment or other types of treatment such as physical therapy.  You don't always need X-rays for the first assessment of back pain, unless you had a physical injury such as from a car accident or fall. If your pain continues and doesn't respond to medical  treatment, X-rays and other tests may then be done.   Home care    As soon as possible, start sitting or walking again. This will help prevent problems from a long bed rest. These problems include muscle weakness, worsening back stiffness and pain, and blood clots in the legs.    When in bed, try to find a position of comfort. A firm mattress is best. Try lying flat on your back with pillows under your knees. You can also try lying on your side with your knees bent up toward your chest and a pillow between your knees.    Don't sit for long periods. Also limit car rides and travel. This puts more stress on the lower back than standing or walking.     During the first 24 to 72 hours after an injury or flare-up, put an ice pack on the painful area for 20 minutes, then remove it for 20 minutes. Do this over a period of 60 to 90 minutes, or several times a day. This will reduce swelling and pain. Always wrap ice packs in a thin towel.    You can start with ice, then switch to heat. Heat from a hot shower, hot bath, or heating pad reduces pain and works well for muscle spasms. Put heat on the painful area for 20 minutes, then remove it for 20 minutes. Do this over a period of 60 to 90 minutes, or several times a day. Don't sleep on a heating pad. It can burn or damage skin.    Alternate using ice and heat.    Be aware of safe lifting methods. don't lift anything over 15 pounds until all the pain is gone.  Gentle stretching will help your back heal faster. Do this simple routine 2 to 3 times a day until your back is feeling better.    Lie on your back with your knees bent and both feet on the ground.    Slowly raise your left knee to your chest as you flatten your lower back against the floor. Hold for 20 to 30 seconds.    Relax and repeat the exercise with your right knee.    Do 2 to 3 of these exercises for each leg.    Repeat, hugging both knees to your chest at the same time.    Don't bounce, but use a gentle  pull.  Medicines  Talk with your doctor before using medicine, especially if you have other medical problems or are taking other medicines.  You may use over-the-counter medicines such as acetaminophen, ibuprofen, or naprosyn to control pain, unless your healthcare provider prescribed another pain medicine. Talk with your healthcare provider if you have a chronic condition such as diabetes, liver or kidney disease, stomach ulcer, or digestive bleeding, or are taking blood thinners.  Be careful if you are given prescription pain medicine, opioids, or medicine for muscle spasm. They can cause drowsiness, and affect your coordination, reflexes, and judgment. Don't drive or operate heavy machinery when taking these medicines. Take pain medicine only as prescribed by your healthcare provider.  Follow-up care  Follow up with your doctor, or as advised. You may need physical therapy or more tests.  If X-rays were taken, they may be reviewed by a radiologist. You will be told of any new findings that may affect your care.  Call   Call if any of these occur:    Trouble breathing    Confusion    Drowsiness or trouble awakening    Fainting or loss of consciousness    Rapid or very slow heart rate    Loss of bowel or bladder control  When to seek medical advice  Call your healthcare provider right away if any of these occur:    Pain becomes worse or spreads to your legs    Weakness or numbness in one or both legs    Numbness in the groin or genital area    Fever of 100.4 F (38 C) or higher , or as directed by your healthcare provider    Chills    Burning or pain when passing urine  [employer] last reviewed this educational content on 11/1/2018 2000-2021 [employer]. All rights reserved. This information is not intended as a substitute for professional medical care. Always follow your healthcare professional's instructions.           Patient Education     Back Exercises: Abdominal Lift Brace with Marching    The  abdominal lift brace with march strengthens your lower abdominal muscles, helping you keep your pelvis and back stable:    Lie on the floor with both knees bent. Put your feet flat on the floor and your arms by your sides. Tighten your abdominal muscles. Be sure to continue to breathe.    Lift one bent knee about 2 inches then return it to the floor and lift the other about 2 inches. Keep your abdominal muscles tight and continue to breathe. These motions should be slow and controlled without your pelvis rocking side to side.    Repeat 10 times.  [employer] last reviewed this educational content on 3/1/2018    7838-1262 [employer] All rights reserved. This information is not intended as a substitute for professional medical care. Always follow your healthcare professional's instructions.           Patient Education     Back Exercises: Arm Reach    Do this exercise on your hands and knees. Keep your knees under your hips and your hands under your shoulders. Keep your spine in a neutral position (not arched or sagging). Be sure to maintain your neck s natural curve:    Stretch one arm straight out in front of you. Don t raise your head or let your supporting shoulder sag.    Hold for 5 seconds.    Return to starting position.    Repeat 5 times.    Switch arms.  [employer] last reviewed this educational content on 3/1/2018    5012-8453 [employer]. All rights reserved. This information is not intended as a substitute for professional medical care. Always follow your healthcare professional's instructions.           Patient Education     Back Exercises: Back Press    Do this exercise on your hands and knees. Keep your knees under your hips and your hands under your shoulders. Keep your spine in a neutral position (not arched or sagging). Be sure to maintain your neck s natural curve:    Tighten your stomach and buttock muscles to press your back upward. Let your head drop slightly.    Hold  for 5 seconds. Return to starting position.    Repeat 5 times.  [employer] last reviewed this educational content on 3/1/2018    8780-0710 [employer] All rights reserved. This information is not intended as a substitute for professional medical care. Always follow your healthcare professional's instructions.           Patient Education     Back Exercises: Back Release  Do this exercise on your hands and knees. Keep your knees under your hips and your hands under your shoulders.        Relax your abdominal and buttocks muscles, lift your head, and let your back sag. Be sure to keep your weight evenly distributed. Don t sit back on your hips.     Hold for 5 seconds.    Return to starting position.    Tuck your head and lift (arch) your back.    Hold for 5 seconds    Return to starting position.    Repeat 5 times.  [employer] last reviewed this educational content on 3/1/2018    [id number] [employer] All rights reserved. This information is not intended as a substitute for professional medical care. Always follow your healthcare professional's instructions.           Patient Education     Back Exercises: Back Extension with Elbow Press    To start, lie face down on your stomach, feet slightly apart, forehead on the floor. Breathe deeply. You should feel comfortable and relaxed in this position.    Press up on your forearms. Keep your stomach and hips on the floor. Stay within your painfree range.    Hold for 20 seconds. Lower slowly.    Repeat 2 times.    Return to starting position.  [employer] last reviewed this educational content on 3/1/2018    7617-3437 [employer]. All rights reserved. This information is not intended as a substitute for professional medical care. Always follow your healthcare professional's instructions.           Patient Education     Back Exercises: Knee Lift         To start, lie on your back with your knees bent and feet flat on the floor. Don t press  your neck or lower back to the floor. Breathe deeply. You should feel comfortable and relaxed in this position:    Start by tightening your abdominal muscles.    Lift one bent knee off the floor 2 to 4 inches.    Hold for 10 seconds. Return to start position.    Repeat 3 times.    Switch legs.  [employer] last reviewed this educational content on 3/1/2018    0494-4843 [employer] All rights reserved. This information is not intended as a substitute for professional medical care. Always follow your healthcare professional's instructions.           Patient Education     Back Exercises: Leg Pull    To start, lie on your back with your knees bent and feet flat on the floor. Don t press your neck or lower back to the floor. Breathe deeply. You should feel comfortable and relaxed in this position.    Pull one knee to your chest.    Hold for 30 to 60 seconds. Return to starting position.    Repeat 2 times.    Switch legs.    For a double leg pull, pull both legs to your chest at the same time. Repeat 2 times.  For your safety, check with your healthcare provider before starting an exercise program.    [employer] last reviewed this educational content on 3/1/2018    [id number] [employer]. All rights reserved. This information is not intended as a substitute for professional medical care. Always follow your healthcare professional's instructions.           Patient Education     Back Exercises: Leg Reach      Do this exercise on your hands and knees. Keep your knees under your hips and your hands under your shoulders. Keep your spine in a neutral position (not arched or sagging). Be sure to maintain your neck s natural curve:    Extend one leg straight back. Don t arch your back or let your head or body sag.    Hold for 5 seconds. Return to starting position.    Repeat 5 times.    Switch legs.   [employer] last reviewed this educational content on 3/1/2018    5385-0774 [employer]. All  rights reserved. This information is not intended as a substitute for professional medical care. Always follow your healthcare professional's instructions.           Patient Education     Back Exercises: Lower Back Rotation    To start, lie on your back with your knees bent and feet flat on the floor. Don t press your neck or lower back to the floor. Breathe deeply. You should feel comfortable and relaxed in this position.    Drop both knees to one side. Turn your head to the other side. Keep your shoulders flat on the floor.    Do not push through pain.    Hold for 20 seconds.    Slowly switch sides.    Repeat 2 to 5 times.  [employer] last reviewed this educational content on 3/1/2018    9299-8161 [employer]. All rights reserved. This information is not intended as a substitute for professional medical care. Always follow your healthcare professional's instructions.           Patient Education     Back Exercises: Lower Back Stretch    To start, sit in a chair with your feet flat on the floor. Shift your weight slightly forward. Relax, and keep your ears, shoulders, and hips aligned while you do the following:    Sit with your feet well apart.    Bend forward and touch the floor with the backs of your hands. Relax and let your body drop.    Hold for  20 seconds. Return to starting position.    Repeat  2 times.   Note: If you've had back or hip surgery, talk with your healthcare provider before doing this stretch.  [employer] last reviewed this educational content on 4/1/2020 2000-2021 [employer] All rights reserved. This information is not intended as a substitute for professional medical care. Always follow your healthcare professional's instructions.           Patient Education     Back Exercises: Partial Curl-Ups    To start, lie on your back with your knees bent and feet flat on the floor. Don t press your neck or lower back to the floor. Breathe deeply. You should feel comfortable  and relaxed in this position:    Cross your arms loosely.    Tighten your abdomen and curl retirement up, keeping your head in line with your shoulders.    Hold for 5 seconds. Uncurl to lie down.    Repeat 2 sets of 10.   [employer] last reviewed this educational content on 3/1/2018    [id number] [employer]. All rights reserved. This information is not intended as a substitute for professional medical care. Always follow your healthcare professional's instructions.           Patient Education     Back Exercises: Pelvic Tilt    To start, lie on your back with your knees bent and feet flat on the floor. Don t press your neck or lower back to the floor. Breathe deeply. You should feel comfortable and relaxed in this position:    Tighten your stomach and buttocks, and press your lower back toward the floor. This should be a small, subtle movement. This should not increase your pain.    Hold for 5 to 15 seconds. Release.    Repeat 2 to 5 times.  [employer] last reviewed this educational content on 3/1/2018    [id number] [employer] All rights reserved. This information is not intended as a substitute for professional medical care. Always follow your healthcare professional's instructions.           Patient Education     Back Exercises: Side Stretch    To start, sit in a chair with your feet flat on the floor. Shift your weight slightly forward to avoid rounding your back. Relax. Keep your ears, shoulders, and hips aligned:    Stretch your right arm overhead.    Slowly bend to the left. Don t twist your torso. Stay within your pain limits.    Hold for 20 seconds. Return to starting position.    Repeat 2 to 5 times. Then, switch to the other side.  [employer] last reviewed this educational content on 3/1/2018    2208-7154 [employer] All rights reserved. This information is not intended as a substitute for professional medical care. Always follow your healthcare professional's  instructions.

## 2022-02-08 NOTE — PROGRESS NOTES
"  SUBJECTIVE:   Tenzin Ca is a 46 year old adult who presents to clinic today for the following health issues: Pinched nerve    Patient arrives here for a pinched back nerve. He states that hurts. Is getting worse. Hurts all the time. When he lays when he sits when he twists produces pain. In the middle but also extends laterally. Lower thoracic upper lumbar. Is not complaining of any leg discomfort. No changes in bowel or bladder habits. He reports he is \"tingling all over\".    History of Present Illness       Back Pain:  He presents for follow up of back pain. Patient's back pain is a chronic problem.  Location of back pain:  Right lower back and left lower back  Description of back pain: stabbing  Back pain spreads: right buttocks, left buttocks and right shoulder    Since patient first noticed back pain, pain is: rapidly worsening  Does back pain interfere with his job:  Not applicable      He eats 2-3 servings of fruits and vegetables daily.He consumes 4 sweetened beverage(s) daily.He exercises with enough effort to increase his heart rate 9 or less minutes per day.  He exercises with enough effort to increase his heart rate 3 or less days per week.   He is taking medications regularly.        Patient Active Problem List    Diagnosis Date Noted     Chronic bilateral low back pain without sciatica 02/08/2022     Priority: Medium     Numbness 11/22/2021     Priority: Medium     Fusion of spine of cervical region 09/20/2021     Priority: Medium     Sebaceous cyst 05/26/2021     Priority: Medium     Skin nodule 05/26/2021     Priority: Medium     Morbid obesity (H) 05/12/2021     Priority: Medium     Non-intractable vomiting with nausea 05/12/2021     Priority: Medium     Vitamin D deficiency 12/26/2018     Priority: Medium     Slow transit constipation 02/22/2018     Priority: Medium     Hypertriglyceridemia 02/20/2018     Priority: Medium     Major depressive disorder, recurrent episode (H) 02/20/2018     " Priority: Medium     Psoriasis 02/20/2018     Priority: Medium     Schizophrenia (H) 02/20/2018     Priority: Medium     Overview:   hears voices       Torn medial meniscus 02/20/2018     Priority: Medium     Obesity 02/21/2017     Priority: Medium     S/P left knee arthroscopy 07/05/2016     Priority: Medium     Psychosis (H) 07/02/2015     Priority: Medium     Erectile dysfunction 04/23/2014     Priority: Medium     Delayed ejaculation 06/03/2013     Priority: Medium     Past Medical History:   Diagnosis Date     Mental disorder     Disability related to mental illness     Personal history of other mental and behavioral disorders     2016        Review of Systems     OBJECTIVE:     /80 (BP Location: Right arm, Patient Position: Sitting, Cuff Size: Adult Large)   Pulse 78   SpO2 98%   There is no height or weight on file to calculate BMI.  Physical Exam  Constitutional:       Appearance: He is obese.   Musculoskeletal:      Comments: Pain can be reproduced with palpation to his lumbar lower thoracic musculature.   Neurological:      Mental Status: He is alert.      Comments: Gait was normal   Psychiatric:         Mood and Affect: Mood normal.             ASSESSMENT/PLAN:         (M54.50,  G89.29) Chronic bilateral low back pain without sciatica  (primary encounter diagnosis)  Comment: X-rays show degenerative changes. I suspect his symptoms are related to muscular. He was given the number of exercises to complete. If no improvement consider physical therapy. I offered him physical therapy this visit but he reports in the past his insurance has not covered it.  Plan: XR Lumbar Spine 2/3 Views, cyclobenzaprine         (FLEXERIL) 10 MG tablet                Ervin Hassan MD  [facility]    Answers for HPI/ROS submitted by the patient on 2/8/2022  If you checked off any problems, how difficult have these problems made it for you to do your work, take care of things at home, or get  along with other people?: Very difficult  PHQ9 TOTAL SCORE: 12  Your back pain is: chronic  Where is your back pain located? : right lower back, left lower back  How would you describe your back pain? : stabbing  Where does your back pain spread? : right buttocks, left buttocks, right shoulder  Since you noticed your back pain, how has it changed? : rapidly worsening  Does your back pain interfere with your job?: Not applicable  How many servings of fruits and vegetables do you eat daily?: 2-3  On average, how many sweetened beverages do you drink each day (Examples: soda, juice, sweet tea, etc.  Do NOT count diet or artificially sweetened beverages)?: 4  How many minutes a day do you exercise enough to make your heart beat faster?: 9 or less  How many days a week do you exercise enough to make your heart beat faster?: 3 or less  How many days per week do you miss taking your medication?: 0

## 2022-02-08 NOTE — NURSING NOTE
"Chief Complaint   Patient presents with     Pain     Pinched nerve        Initial /80 (BP Location: Right arm, Patient Position: Sitting, Cuff Size: Adult Large)   Pulse 78   SpO2 98%  Estimated body mass index is 40.66 kg/m  as calculated from the following:    Height as of 12/6/21: 1.727 m (5' 8\").    Weight as of 1/19/22: 121.3 kg (267 lb 6.4 oz).  Medication Reconciliation: complete    Maura Anderson RN  "

## 2022-02-09 ASSESSMENT — PATIENT HEALTH QUESTIONNAIRE - PHQ9: SUM OF ALL RESPONSES TO PHQ QUESTIONS 1-9: 12

## 2022-02-22 ENCOUNTER — OFFICE VISIT (OUTPATIENT)
Dept: FAMILY MEDICINE | Facility: OTHER | Age: 47
End: 2022-02-22
Attending: FAMILY MEDICINE
Payer: COMMERCIAL

## 2022-02-22 VITALS
TEMPERATURE: 97.6 F | OXYGEN SATURATION: 99 % | DIASTOLIC BLOOD PRESSURE: 72 MMHG | BODY MASS INDEX: 41.36 KG/M2 | RESPIRATION RATE: 20 BRPM | SYSTOLIC BLOOD PRESSURE: 120 MMHG | WEIGHT: 272 LBS | HEART RATE: 62 BPM

## 2022-02-22 DIAGNOSIS — K59.01 SLOW TRANSIT CONSTIPATION: Primary | ICD-10-CM

## 2022-02-22 PROCEDURE — G0463 HOSPITAL OUTPT CLINIC VISIT: HCPCS

## 2022-02-22 PROCEDURE — 99213 OFFICE O/P EST LOW 20 MIN: CPT | Performed by: FAMILY MEDICINE

## 2022-02-22 RX ORDER — POLYETHYLENE GLYCOL 3350 17 G/17G
17 POWDER, FOR SOLUTION ORAL DAILY
Qty: 510 G | Refills: 0 | Status: SHIPPED | OUTPATIENT
Start: 2022-02-22 | End: 2023-09-29

## 2022-02-22 RX ORDER — ARIPIPRAZOLE 15 MG/1
15 TABLET ORAL EVERY MORNING
COMMUNITY
Start: 2022-02-01 | End: 2022-06-09

## 2022-02-22 ASSESSMENT — ANXIETY QUESTIONNAIRES
4. TROUBLE RELAXING: MORE THAN HALF THE DAYS
2. NOT BEING ABLE TO STOP OR CONTROL WORRYING: NEARLY EVERY DAY
7. FEELING AFRAID AS IF SOMETHING AWFUL MIGHT HAPPEN: NEARLY EVERY DAY
3. WORRYING TOO MUCH ABOUT DIFFERENT THINGS: NEARLY EVERY DAY
GAD7 TOTAL SCORE: 17
6. BECOMING EASILY ANNOYED OR IRRITABLE: MORE THAN HALF THE DAYS
5. BEING SO RESTLESS THAT IT IS HARD TO SIT STILL: MORE THAN HALF THE DAYS
GAD7 TOTAL SCORE: 17
7. FEELING AFRAID AS IF SOMETHING AWFUL MIGHT HAPPEN: NEARLY EVERY DAY
1. FEELING NERVOUS, ANXIOUS, OR ON EDGE: MORE THAN HALF THE DAYS
GAD7 TOTAL SCORE: 17

## 2022-02-22 ASSESSMENT — PATIENT HEALTH QUESTIONNAIRE - PHQ9
10. IF YOU CHECKED OFF ANY PROBLEMS, HOW DIFFICULT HAVE THESE PROBLEMS MADE IT FOR YOU TO DO YOUR WORK, TAKE CARE OF THINGS AT HOME, OR GET ALONG WITH OTHER PEOPLE: VERY DIFFICULT
SUM OF ALL RESPONSES TO PHQ QUESTIONS 1-9: 5
SUM OF ALL RESPONSES TO PHQ QUESTIONS 1-9: 5

## 2022-02-22 ASSESSMENT — ENCOUNTER SYMPTOMS: BACK PAIN: 1

## 2022-02-22 ASSESSMENT — PAIN SCALES - GENERAL: PAINLEVEL: MILD PAIN (2)

## 2022-02-22 NOTE — NURSING NOTE
Patient here for back pain across low back and circles around to the front. He has been constipated for the past 7 days. Medication Reconciliation: complete.    Jaquelin Aguilera LPN  2/22/2022 2:07 PM   Resulted

## 2022-02-22 NOTE — PROGRESS NOTES
"  Assessment & Plan     Slow transit constipation  Start  - polyethylene glycol (MIRALAX) 17 GM/Dose powder; Take 17 g by mouth daily             BMI:   Estimated body mass index is 41.36 kg/m  as calculated from the following:    Height as of 12/6/21: 1.727 m (5' 8\").    Weight as of this encounter: 123.4 kg (272 lb).       Follow-up if no improvement    No follow-ups on file.    Ervin Hassan MD  [facility]   Tenzin is a 46 year old who presents for the following health issues back pain and constipation.   Patient arrives here because of constipation.  Is been going on for 7 days.  Although he did have a small bowel movement yesterday.  Is been having some belly.  He also has a history of back pain but reports that this is improving.  He would like to discuss colonoscopy.    Back Pain     History of Present Illness       Back Pain:  He presents for follow up of back pain. Patient's back pain is a recurring problem.  Location of back pain:  Right lower back, left lower back, right middle of back and left middle of back  Description of back pain: cramping  Back pain spreads: right thigh and left thigh    Since patient first noticed back pain, pain is: always present, but gets better and worse  Does back pain interfere with his job:  Not applicable      Mental Health Follow-up:  Patient presents to follow-up on Depression & Anxiety.Patient's depression since last visit has been:  Medium  The patient is having other symptoms associated with depression.  Patient's anxiety since last visit has been:  Bad  The patient is having other symptoms associated with anxiety.  Any significant life events: health concerns  Patient is feeling anxious or having panic attacks.  Patient has no concerns about alcohol or drug use.     Social History  Tobacco Use    Smoking status: Never Smoker    Smokeless tobacco: Never Used  Vaping Use    Vaping Use: Never used  Alcohol use: No    Alcohol/week: 0.0 " standard drinks  Drug use: No      Today's PHQ-9         PHQ-9 Total Score:     (P) 5   PHQ-9 Q9 Thoughts of better off dead/self-harm past 2 weeks :   (P) Not at all   Thoughts of suicide or self harm:      Self-harm Plan:        Self-harm Action:          Safety concerns for self or others:           Hyperlipidemia:  He presents for follow up of hyperlipidemia.  He is not taking medication to lower cholesterol. He is not having myalgia or other side effects to statin medications.    He eats 0-1 servings of fruits and vegetables daily.He consumes 6 sweetened beverage(s) daily.He exercises with enough effort to increase his heart rate 9 or less minutes per day.  He exercises with enough effort to increase his heart rate 3 or less days per week.   He is taking medications regularly.             Review of Systems   Musculoskeletal: Positive for back pain.            Objective    There were no vitals taken for this visit.  There is no height or weight on file to calculate BMI.  Physical Exam  Abdominal:      General: Abdomen is flat. There is no distension.      Palpations: There is no mass.      Tenderness: There is no abdominal tenderness. There is no guarding.   Neurological:      Mental Status: He is alert.

## 2022-02-23 ASSESSMENT — ANXIETY QUESTIONNAIRES: GAD7 TOTAL SCORE: 17

## 2022-02-23 ASSESSMENT — PATIENT HEALTH QUESTIONNAIRE - PHQ9: SUM OF ALL RESPONSES TO PHQ QUESTIONS 1-9: 5

## 2022-02-25 ENCOUNTER — OFFICE VISIT (OUTPATIENT)
Dept: FAMILY MEDICINE | Facility: OTHER | Age: 47
End: 2022-02-25
Attending: PHYSICIAN ASSISTANT
Payer: COMMERCIAL

## 2022-02-25 ENCOUNTER — TELEPHONE (OUTPATIENT)
Dept: FAMILY MEDICINE | Facility: OTHER | Age: 47
End: 2022-02-25
Payer: COMMERCIAL

## 2022-02-25 VITALS
SYSTOLIC BLOOD PRESSURE: 110 MMHG | TEMPERATURE: 98.1 F | WEIGHT: 264.2 LBS | DIASTOLIC BLOOD PRESSURE: 60 MMHG | BODY MASS INDEX: 40.04 KG/M2 | HEART RATE: 78 BPM | RESPIRATION RATE: 20 BRPM | OXYGEN SATURATION: 97 % | HEIGHT: 68 IN

## 2022-02-25 DIAGNOSIS — Z01.89 PATIENT REQUEST FOR DIAGNOSTIC TESTING: ICD-10-CM

## 2022-02-25 DIAGNOSIS — J11.1 INFLUENZA-LIKE ILLNESS: Primary | ICD-10-CM

## 2022-02-25 PROCEDURE — 99213 OFFICE O/P EST LOW 20 MIN: CPT | Performed by: NURSE PRACTITIONER

## 2022-02-25 PROCEDURE — C9803 HOPD COVID-19 SPEC COLLECT: HCPCS | Performed by: NURSE PRACTITIONER

## 2022-02-25 PROCEDURE — G0463 HOSPITAL OUTPT CLINIC VISIT: HCPCS

## 2022-02-25 PROCEDURE — U0005 INFEC AGEN DETEC AMPLI PROBE: HCPCS | Mod: ZL | Performed by: NURSE PRACTITIONER

## 2022-02-25 ASSESSMENT — PAIN SCALES - GENERAL: PAINLEVEL: EXTREME PAIN (8)

## 2022-02-25 NOTE — TELEPHONE ENCOUNTER
Patient call returned, verification of name and . Patient states symptoms began 2022 including congestions, sore throat and pounding in head. Patient desires to be tested for COVID-19. Advised patient to either have COVID-19 testing appointment only or be seen in [facility] for evaluation if desires of patient are to speak with provider. Patient verbalized plan to secure bus ride to facility to be seen in [facility]. Denies breathing concerns at this time as well as no fever. Patient has no further questions or concerns. Claudia Colvin RN ....................  2022   12:01 PM

## 2022-02-25 NOTE — TELEPHONE ENCOUNTER
Please call the patient.  He has cold symptoms and wants to know if he should come in and be seen.      Marilou Cano on 2/25/2022 at 11:53 AM

## 2022-02-25 NOTE — LETTER
My Depression Action Plan  Name: Tenzin Ca   Date of Birth 1975  Date: 2/25/2022    My doctor: Ervin Hassan   My clinic: [facility]  [address]  [place] [id number] [phone]          GREEN    ZONE   Good Control    What it looks like:     Things are going generally well. You have normal ups and downs. You may even feel depressed from time to time, but bad moods usually last less than a day.   What you need to do:  1. Continue to care for yourself (see self care plan)  2. Check your depression survival kit and update it as needed  3. Follow your physician s recommendations including any medication.  4. Do not stop taking medication unless you consult with your physician first.           YELLOW         ZONE Getting Worse    What it looks like:     Depression is starting to interfere with your life.     It may be hard to get out of bed; you may be starting to isolate yourself from others.    Symptoms of depression are starting to last most all day and this has happened for several days.     You may have suicidal thoughts but they are not constant.   What you need to do:     1. Call your care team. Your response to treatment will improve if you keep your care team informed of your progress. Yellow periods are signs an adjustment may need to be made.     2. Continue your self-care.  Just get dressed and ready for the day.  Don't give yourself time to talk yourself out of it.    3. Talk to someone in your support network.    4. Open up your Depression Self-Care Plan/Wellness Kit.           RED    ZONE Medical Alert - Get Help    What it looks like:     Depression is seriously interfering with your life.     You may experience these or other symptoms: You can t get out of bed most days, can t work or engage in other necessary activities, you have trouble taking care of basic hygiene, or basic responsibilities, thoughts of suicide or death that will not go  away, self-injurious behavior.     What you need to do:  1. Call your care team and request a same-day appointment. If they are not available (weekends or after hours) call your local crisis line, emergency room or 911.          Depression Self-Care Plan / Wellness Kit    Many people find that medication and therapy are helpful treatments for managing depression. In addition, making small changes to your everyday life can help to boost your mood and improve your wellbeing. Below are some tips for you to consider. Be sure to talk with your medical provider and/or behavioral health consultant if your symptoms are worsening or not improving.     Sleep   Sleep hygiene  means all of the habits that support good, restful sleep. It includes maintaining a consistent bedtime and wake time, using your bedroom only for sleeping or sex, and keeping the bedroom dark and free of distractions like a computer, smartphone, or television.     Develop a Healthy Routine  Maintain good hygiene. Get out of bed in the morning, make your bed, brush your teeth, take a shower, and get dressed. Don t spend too much time viewing media that makes you feel stressed. Find time to relax each day.    Exercise  Get some form of exercise every day. This will help reduce pain and release endorphins, the  feel good  chemicals in your brain. It can be as simple as just going for a walk or doing some gardening, anything that will get you moving.      Diet  Strive to eat healthy foods, including fruits and vegetables. Drink plenty of water. Avoid excessive sugar, caffeine, alcohol, and other mood-altering substances.     Stay Connected with Others  Stay in touch with friends and family members.    Manage Your Mood  Try deep breathing, massage therapy, biofeedback, or meditation. Take part in fun activities when you can. Try to find something to smile about each day.     Psychotherapy  Be open to working with a therapist if your provider recommends it.      Medication  Be sure to take your medication as prescribed. Most anti-depressants need to be taken every day. It usually takes several weeks for medications to work. Not all medicines work for all people. It is important to follow-up with your provider to make sure you have a treatment plan that is working for you. Do not stop your medication abruptly without first discussing it with your provider.    Crisis Resources   These hotlines are for both adults and children. They and are open 24 hours a day, 7 days a week unless noted otherwise.      National Suicide Prevention Lifeline   7-430-985-TALK (9264)      Crisis Text Line    www.crisistextline.org  Text HOME to 331643 from anywhere in the [place], anytime, about any type of crisis. A live, trained crisis counselor will receive the text and respond quickly.      [employer] Lifeline for LGBTQ Youth  A national crisis intervention and suicide lifeline for LGBTQ youth under 25. Provides a safe place to talk without judgement. Call 1-648.321.8068; text START to 349722 or visit www.thetrevorproject.org to talk to a trained counselor.      For [facility] crisis numbers, visit the [facility] website at:  https://[place].gov/dhs/people-we-serve/adults/health-care/mental-health/resources/crisis-contacts.jsp

## 2022-02-25 NOTE — LETTER
[facility]  [address]  [place] [facility] [address]  Phone: [phone]  Fax: [phone]    February 25, 2022        Tenzin Ca  [address]    [facility] [address]          To whom it may concern:    RE: Tenzin Ca    Patient was seen and tested today at our clinic for Covid.  Recommend patient quarantine for 5 to 7 days from onset of symptoms.     Please contact me for questions or concerns.      Sincerely,        Ayaka Baldwin, NP

## 2022-02-25 NOTE — PROGRESS NOTES
ASSESSMENT/PLAN:     I have reviewed the nursing notes.  I have reviewed the findings, diagnosis, plan and need for follow up with the patient.      1. Patient request for diagnostic testing    - Symptomatic; Yes; 2/23/2022 COVID-19 Virus (Coronavirus) by PCR Nose    2. Influenza-like illness    - Symptomatic; Yes; 2/23/2022 COVID-19 Virus (Coronavirus) by PCR Nose      Discussed with patient that symptoms and exam are consistent with viral illness.    No clinical indications for antibiotic treatment at this time.    Covid vaccinated including booster  Influenza vaccinated    Symptomatic treatment - Encouraged fluids, salt water gargles, honey, elevation, humidifier, saline nasal spray, sinus rinse/netti pot, lozenges, tea, soup, smoothies, popsicles, topical vapor rub, rest, etc     May use over-the-counter Tylenol or ibuprofen PRN  May use over the counter cough or cold medication PRN    Self quarantine until test results are available and continue if positive for 5 days and until free fever and symptoms improving or longer if fevers or symptoms persisting.    Continue to mask for total of 10 days.    No work unless covid test is negative or 5 days have passed with no fevers and improvement in symptoms.    Continue to mask for total of 10 days.    Instructed to limit movements outside of home as much as possible, social distance, mask in public spaces, and monitor closely for COVID-19 symptoms      Discussed warning signs/symptoms indicative of need to f/u  Follow up if symptoms persist or worsen or concerns      I explained my diagnostic considerations and recommendations to the patient, who voiced understanding and agreement with the treatment plan. All questions were answered. We discussed potential side effects of any prescribed or recommended therapies, as well as expectations for response to treatments.    Ayaka Baldwin NP  [facility] AND [facility]      SUBJECTIVE:   Tenzin Ca is a 46 year  old adult who presents to clinic today for the following health issues:  Covid testing    HPI  Symptoms for the past 2 days, concerned about Covid.  Symptoms were the worst yesterday, less severe today.    Shortness of breath at night and with activity.  Cough is congested.  Chest tightness and heaviness.  Generalized headache.    Sore throat, intermittent discomfort with swallowing.  Runny and stuffy nose.  Mild chills.  No fevers.  No nausea or vomiting.  Appetite fair to baseline.  Diarrhea about 3 to 5 times daily over the past 2 days.  No abdominal pain.  Energy fair to normal.    No known sick contacts.    No OTC medications   Covid vaccinated including booster  Influenza vaccinated      Past Medical History:   Diagnosis Date     Mental disorder     Disability related to mental illness     Personal history of other mental and behavioral disorders     2016     Past Surgical History:   Procedure Laterality Date     ARTHROSCOPY KNEE      Left Knee Arthroscopy and Partial Meniscectomy and Chondroplasty     Social History     Tobacco Use     Smoking status: Never Smoker     Smokeless tobacco: Never Used   Substance Use Topics     Alcohol use: No     Alcohol/week: 0.0 standard drinks     Current Outpatient Medications   Medication Sig Dispense Refill     ARIPiprazole (ABILIFY) 15 MG tablet Take 15 mg by mouth every morning       clonazePAM (KLONOPIN) 1 MG tablet TAKE 1 TABLET BY MOUTH 3 TIMES DAILY AS NEEDED FOR SEVERE ANXIETY AND VOICES       cyclobenzaprine (FLEXERIL) 10 MG tablet Take 1 tablet (10 mg) by mouth 3 times daily as needed for muscle spasms 30 tablet 1     cyclobenzaprine (FLEXERIL) 10 MG tablet Take 1 tablet (10 mg) by mouth 3 times daily as needed for muscle spasms 30 tablet 1     OXcarbazepine (TRILEPTAL) 300 MG tablet Take 300 mg by mouth every morning       OXcarbazepine (TRILEPTAL) 600 MG tablet Take 600 mg by mouth At Bedtime       polyethylene glycol (MIRALAX) 17 GM/Dose powder Take 17 g by  "mouth daily 510 g 0     prazosin (MINIPRESS) 1 MG capsule Take 1 capsule by mouth nightly as needed       senna-docusate (STOOL SOFTENER/LAXATIVE) 8.6-50 MG tablet Take 1 tablet by mouth 2 times daily 180 tablet 3     tadalafil (CIALIS) 20 MG tablet Take 0.5-1 tablet by mouth 2 hours prior to sexual activity 20 tablet 11     zolpidem (AMBIEN) 10 MG tablet Take 10 mg by mouth At Bedtime       No Known Allergies      Past medical history, past surgical history, current medications and allergies reviewed and accurate to the best of my knowledge.        OBJECTIVE:     /60   Pulse 78   Temp 98.1  F (36.7  C) (Tympanic)   Resp 20   Ht 1.727 m (5' 8\")   Wt 119.8 kg (264 lb 3.2 oz)   SpO2 97%   Breastfeeding No   BMI 40.17 kg/m    Body mass index is 40.17 kg/m .     Physical Exam  General Appearance: Well appearing adult male, non ill appearance, appropriate appearance for age. No acute distress  Ears: Left TM intact with bony landmarks appreciated, no erythema, no effusion, no bulging, no purulence.  Right TM intact with bony landmarks appreciated, no erythema, no effusion, no bulging, no purulence.  Left auditory canal clear without drainage or bleeding.  Right auditory canal clear without drainage or bleeding.  Normal external ears, non tender.  Eyes: conjunctivae normal without erythema or irritation, corneas clear, no drainage or crusting, no eyelid swelling, pupils equal   Orophayrnx: moist mucous membranes, pharynx without erythema, tonsils without hypertrophy, tonsils without erythema, no tonsillar exudates, no oral lesions, no palate petechiae, no post nasal drip seen, no trismus, voice clear.    Sinuses:  No sinus tenderness upon palpation of the frontal or maxillary sinuses  Nose:  No noted active drainage  Neck: supple without adenopathy  Respiratory: normal chest wall and respirations.  Normal effort.  Clear to auscultation bilaterally, no wheezing, crackles or rhonchi.  No increased work of " breathing.  No cough appreciated.  Cardiac: RRR with no murmurs  Musculoskeletal:  Equal movement of bilateral upper extremities.  Equal movement of bilateral lower extremities.  Normal gait.    Psychological: normal affect, alert, oriented, pleasant, talkative.       Labs:  Covid pending

## 2022-02-26 LAB — SARS-COV-2 RNA RESP QL NAA+PROBE: NEGATIVE

## 2022-04-20 ENCOUNTER — OFFICE VISIT (OUTPATIENT)
Dept: FAMILY MEDICINE | Facility: OTHER | Age: 47
End: 2022-04-20
Attending: FAMILY MEDICINE
Payer: COMMERCIAL

## 2022-04-20 VITALS
OXYGEN SATURATION: 97 % | DIASTOLIC BLOOD PRESSURE: 78 MMHG | TEMPERATURE: 98.1 F | WEIGHT: 275.8 LBS | BODY MASS INDEX: 41.94 KG/M2 | SYSTOLIC BLOOD PRESSURE: 118 MMHG | HEART RATE: 74 BPM | RESPIRATION RATE: 20 BRPM

## 2022-04-20 DIAGNOSIS — Z02.89 HEALTH EXAMINATION OF DEFINED SUBPOPULATION: ICD-10-CM

## 2022-04-20 DIAGNOSIS — K62.5 BLOOD PER RECTUM: ICD-10-CM

## 2022-04-20 DIAGNOSIS — Z23 NEED FOR VACCINATION: Primary | ICD-10-CM

## 2022-04-20 LAB
ALBUMIN SERPL-MCNC: 4.4 G/DL (ref 3.5–5.7)
ALP SERPL-CCNC: 73 U/L (ref 34–104)
ALT SERPL W P-5'-P-CCNC: 23 U/L (ref 7–52)
ANION GAP SERPL CALCULATED.3IONS-SCNC: 8 MMOL/L (ref 3–14)
AST SERPL W P-5'-P-CCNC: 18 U/L (ref 13–39)
BASOPHILS # BLD AUTO: 0 10E3/UL (ref 0–0.2)
BASOPHILS NFR BLD AUTO: 1 %
BILIRUB SERPL-MCNC: 0.3 MG/DL (ref 0.3–1)
BUN SERPL-MCNC: 7 MG/DL (ref 7–25)
CALCIUM SERPL-MCNC: 9.7 MG/DL (ref 8.6–10.3)
CHLORIDE BLD-SCNC: 104 MMOL/L (ref 98–107)
CHOLEST SERPL-MCNC: 174 MG/DL
CO2 SERPL-SCNC: 26 MMOL/L (ref 21–31)
CREAT SERPL-MCNC: 1.02 MG/DL (ref 0.6–1.3)
EOSINOPHIL # BLD AUTO: 0.2 10E3/UL (ref 0–0.7)
EOSINOPHIL NFR BLD AUTO: 3 %
ERYTHROCYTE [DISTWIDTH] IN BLOOD BY AUTOMATED COUNT: 11.9 % (ref 10–15)
FASTING STATUS PATIENT QL REPORTED: ABNORMAL
GFR SERPL CREATININE-BSD FRML MDRD: NORMAL ML/MIN/{1.73_M2}
GLUCOSE BLD-MCNC: 95 MG/DL (ref 70–105)
HCT VFR BLD AUTO: 41.1 % (ref 35–53)
HDLC SERPL-MCNC: 31 MG/DL (ref 23–92)
HGB BLD-MCNC: 14.4 G/DL (ref 11.7–17.7)
IMM GRANULOCYTES # BLD: 0.1 10E3/UL
IMM GRANULOCYTES NFR BLD: 1 %
LDLC SERPL CALC-MCNC: 85 MG/DL
LYMPHOCYTES # BLD AUTO: 1.4 10E3/UL (ref 0.8–5.3)
LYMPHOCYTES NFR BLD AUTO: 24 %
MCH RBC QN AUTO: 30.5 PG (ref 26.5–33)
MCHC RBC AUTO-ENTMCNC: 35 G/DL (ref 31.5–36.5)
MCV RBC AUTO: 87 FL (ref 78–100)
MONOCYTES # BLD AUTO: 0.5 10E3/UL (ref 0–1.3)
MONOCYTES NFR BLD AUTO: 8 %
NEUTROPHILS # BLD AUTO: 3.7 10E3/UL (ref 1.6–8.3)
NEUTROPHILS NFR BLD AUTO: 63 %
NONHDLC SERPL-MCNC: 143 MG/DL
NRBC # BLD AUTO: 0 10E3/UL
NRBC BLD AUTO-RTO: 0 /100
PLATELET # BLD AUTO: 207 10E3/UL (ref 150–450)
POTASSIUM BLD-SCNC: 4.2 MMOL/L (ref 3.5–5.1)
PROT SERPL-MCNC: 6.8 G/DL (ref 6.4–8.9)
RBC # BLD AUTO: 4.72 10E6/UL (ref 3.8–5.9)
SODIUM SERPL-SCNC: 138 MMOL/L (ref 134–144)
TRIGL SERPL-MCNC: 288 MG/DL
WBC # BLD AUTO: 5.8 10E3/UL (ref 4–11)

## 2022-04-20 PROCEDURE — 85004 AUTOMATED DIFF WBC COUNT: CPT | Mod: ZL | Performed by: FAMILY MEDICINE

## 2022-04-20 PROCEDURE — 36415 COLL VENOUS BLD VENIPUNCTURE: CPT | Mod: ZL | Performed by: FAMILY MEDICINE

## 2022-04-20 PROCEDURE — 80061 LIPID PANEL: CPT | Mod: ZL | Performed by: FAMILY MEDICINE

## 2022-04-20 PROCEDURE — 91305 COVID-19,PF,PFIZER (12+ YRS): CPT

## 2022-04-20 PROCEDURE — 80053 COMPREHEN METABOLIC PANEL: CPT | Mod: ZL | Performed by: FAMILY MEDICINE

## 2022-04-20 PROCEDURE — 99396 PREV VISIT EST AGE 40-64: CPT | Performed by: FAMILY MEDICINE

## 2022-04-20 RX ORDER — ARIPIPRAZOLE 20 MG/1
20 TABLET ORAL EVERY MORNING
COMMUNITY
Start: 2022-03-30 | End: 2022-10-17

## 2022-04-20 ASSESSMENT — PAIN SCALES - GENERAL: PAINLEVEL: EXTREME PAIN (8)

## 2022-04-20 ASSESSMENT — ACTIVITIES OF DAILY LIVING (ADL): CURRENT_FUNCTION: NO ASSISTANCE NEEDED

## 2022-04-20 NOTE — NURSING NOTE
Patient here for physical, concerns with back pain, and constipation and want the second booster vaccine. Medication Reconciliation: complete.    Jaquelin Aguilera LPN  4/20/2022 1:24 PM

## 2022-04-20 NOTE — PROGRESS NOTES
"  SUBJECTIVE:   Tenzin Ca is a 46 year old adult who presents to clinic today for the following health issues physical:    Patient arrives here for physical.  His only concern this visit is constipation and painful bowel movements.  He has noticed increasing blood in his stool.  He has not been in contact with his family for 40 years does not know whether there is history of colon cancer in the family.    Healthy Habits:     In general, how would you rate your overall health?  Fair    Frequency of exercise:  2-3 days/week    Duration of exercise:  15-30 minutes    Do you usually eat at least 4 servings of fruit and vegetables a day, include whole grains    & fiber and avoid regularly eating high fat or \"junk\" foods?  Yes    Taking medications regularly:  Yes    Medication side effects:  None    Ability to successfully perform activities of daily living:  No assistance needed    Home Safety:  No safety concerns identified    Hearing Impairment:  Need to ask people to speak up or repeat themselves and difficulty understanding soft or whispered speech    In the past 6 months, have you been bothered by leaking of urine? Yes    In general, how would you rate your overall mental or emotional health?  Fair      PHQ-2 Total Score: 2    Additional concerns today:  No        Patient Active Problem List    Diagnosis Date Noted     Blood per rectum 04/20/2022     Priority: Medium     Chronic bilateral low back pain without sciatica 02/08/2022     Priority: Medium     Numbness 11/22/2021     Priority: Medium     Fusion of spine of cervical region 09/20/2021     Priority: Medium     Sebaceous cyst 05/26/2021     Priority: Medium     Skin nodule 05/26/2021     Priority: Medium     Morbid obesity (H) 05/12/2021     Priority: Medium     Non-intractable vomiting with nausea 05/12/2021     Priority: Medium     Vitamin D deficiency 12/26/2018     Priority: Medium     Slow transit constipation 02/22/2018     Priority: Medium     " Hypertriglyceridemia 02/20/2018     Priority: Medium     Major depressive disorder, recurrent episode (H) 02/20/2018     Priority: Medium     Psoriasis 02/20/2018     Priority: Medium     Schizophrenia (H) 02/20/2018     Priority: Medium     Overview:   hears voices       Torn medial meniscus 02/20/2018     Priority: Medium     Obesity 02/21/2017     Priority: Medium     S/P left knee arthroscopy 07/05/2016     Priority: Medium     Psychosis (H) 07/02/2015     Priority: Medium     Erectile dysfunction 04/23/2014     Priority: Medium     Delayed ejaculation 06/03/2013     Priority: Medium     Past Medical History:   Diagnosis Date     Mental disorder     Disability related to mental illness     Personal history of other mental and behavioral disorders     2016      Social History     Social History Narrative    Disabled secondary to schizophrenia. Lives independently in his own apartment.     Current Outpatient Medications   Medication Sig Dispense Refill     ARIPiprazole (ABILIFY) 20 MG tablet Take 20 mg by mouth every morning       clonazePAM (KLONOPIN) 1 MG tablet TAKE 1 TABLET BY MOUTH 3 TIMES DAILY AS NEEDED FOR SEVERE ANXIETY AND VOICES       OXcarbazepine (TRILEPTAL) 300 MG tablet Take 300 mg by mouth every morning       OXcarbazepine (TRILEPTAL) 600 MG tablet Take 600 mg by mouth At Bedtime       polyethylene glycol (MIRALAX) 17 GM/Dose powder Take 17 g by mouth daily 510 g 0     prazosin (MINIPRESS) 1 MG capsule Take 1 capsule by mouth nightly as needed       senna-docusate (STOOL SOFTENER/LAXATIVE) 8.6-50 MG tablet Take 1 tablet by mouth 2 times daily 180 tablet 3     tadalafil (CIALIS) 20 MG tablet Take 0.5-1 tablet by mouth 2 hours prior to sexual activity 20 tablet 11     zolpidem (AMBIEN) 10 MG tablet Take 10 mg by mouth At Bedtime       ARIPiprazole (ABILIFY) 15 MG tablet Take 15 mg by mouth every morning (Patient not taking: Reported on 4/20/2022)       cyclobenzaprine (FLEXERIL) 10 MG tablet Take 1  tablet (10 mg) by mouth 3 times daily as needed for muscle spasms (Patient not taking: Reported on 4/20/2022) 30 tablet 1     cyclobenzaprine (FLEXERIL) 10 MG tablet Take 1 tablet (10 mg) by mouth 3 times daily as needed for muscle spasms (Patient not taking: Reported on 4/20/2022) 30 tablet 1     No Known Allergies    Review of Systems     OBJECTIVE:     /78   Pulse 74   Temp 98.1  F (36.7  C)   Resp 20   Wt 125.1 kg (275 lb 12.8 oz)   SpO2 97%   BMI 41.94 kg/m    Body mass index is 41.94 kg/m .  Physical Exam  Constitutional:       Appearance: Normal appearance.   HENT:      Ears:      Comments: Wax bilateral     Nose: Nose normal.      Mouth/Throat:      Mouth: Mucous membranes are moist.   Eyes:      Pupils: Pupils are equal, round, and reactive to light.   Cardiovascular:      Rate and Rhythm: Normal rate and regular rhythm.   Pulmonary:      Effort: Pulmonary effort is normal.      Breath sounds: Normal breath sounds.   Abdominal:      General: Abdomen is flat.   Skin:     General: Skin is warm.   Neurological:      Mental Status: He is alert.   Psychiatric:         Mood and Affect: Mood normal.         Diagnostic Test Results:  Results for orders placed or performed in visit on 04/20/22 (from the past 24 hour(s))   Comprehensive Metabolic Panel   Result Value Ref Range    Sodium 138 134 - 144 mmol/L    Potassium 4.2 3.5 - 5.1 mmol/L    Chloride 104 98 - 107 mmol/L    Carbon Dioxide (CO2) 26 21 - 31 mmol/L    Anion Gap 8 3 - 14 mmol/L    Urea Nitrogen 7 7 - 25 mg/dL    Creatinine 1.02 0.60 - 1.30 mg/dL    Calcium 9.7 8.6 - 10.3 mg/dL    Glucose 95 70 - 105 mg/dL    Alkaline Phosphatase 73 34 - 104 U/L    AST 18 13 - 39 U/L    ALT 23 7 - 52 U/L    Protein Total 6.8 6.4 - 8.9 g/dL    Albumin 4.4 3.5 - 5.7 g/dL    Bilirubin Total 0.3 0.3 - 1.0 mg/dL    GFR Estimate      Narrative    The sex of this patient cannot be reliably determined based on discrepancies in demographics (legal sex, sex assigned at  birth, gender identity).  Both male and female reference ranges are provided where applicable.  Careful evaluation of the patient s results as compared to the gender specific reference intervals is required in this setting.    CBC and Differential    Narrative    The following orders were created for panel order CBC and Differential.  Procedure                               Abnormality         Status                     ---------                               -----------         ------                     CBC with platelets and d...[[id number]]                      Final result                 Please view results for these tests on the individual orders.   Lipid Panel   Result Value Ref Range    Cholesterol 174 <200 mg/dL    Triglycerides 288 (H) <150 mg/dL    Direct Measure HDL 31 23 - 92 mg/dL    LDL Cholesterol Calculated 85 <=100 mg/dL    Non HDL Cholesterol 143 (H) <130 mg/dL    Patient Fasting > 8hrs? Unknown     Narrative    The sex of this patient cannot be reliably determined based on discrepancies in demographics (legal sex, sex assigned at birth, gender identity).  Both male and female reference ranges are provided where applicable.  Careful evaluation of the patient s results as compared to the gender specific reference intervals is required in this setting.   Cholesterol  Desirable:  <200 mg/dL    Triglycerides  Normal:  Less than 150 mg/dL  Borderline High:  150-199 mg/dL  High:  200-499 mg/dL  Very High:  Greater than or equal to 500 mg/dL    Direct Measure HDL  Female:  Greater than or equal to 50 mg/dL   Male:  Greater than or equal to 40 mg/dL    LDL Cholesterol  Desirable:  <100mg/dL  Above Desirable:  100-129 mg/dL   Borderline High:  130-159 mg/dL   High:  160-189 mg/dL   Very High:  >= 190 mg/dL    Non HDL Cholesterol  Desirable:  130 mg/dL  Above Desirable:  130-159 mg/dL  Borderline High:  160-189 mg/dL  High:  190-219 mg/dL  Very High:  Greater than or equal to 220 mg/dL   CBC with platelets  and differential   Result Value Ref Range    WBC Count 5.8 4.0 - 11.0 10e3/uL    RBC Count 4.72 3.80 - 5.90 10e6/uL    Hemoglobin 14.4 11.7 - 17.7 g/dL    Hematocrit 41.1 35.0 - 53.0 %    MCV 87 78 - 100 fL    MCH 30.5 26.5 - 33.0 pg    MCHC 35.0 31.5 - 36.5 g/dL    RDW 11.9 10.0 - 15.0 %    Platelet Count 207 150 - 450 10e3/uL    % Neutrophils 63 %    % Lymphocytes 24 %    % Monocytes 8 %    % Eosinophils 3 %    % Basophils 1 %    % Immature Granulocytes 1 %    NRBCs per 100 WBC 0 <1 /100    Absolute Neutrophils 3.7 1.6 - 8.3 10e3/uL    Absolute Lymphocytes 1.4 0.8 - 5.3 10e3/uL    Absolute Monocytes 0.5 0.0 - 1.3 10e3/uL    Absolute Eosinophils 0.2 0.0 - 0.7 10e3/uL    Absolute Basophils 0.0 0.0 - 0.2 10e3/uL    Absolute Immature Granulocytes 0.1 <=0.4 10e3/uL    Absolute NRBCs 0.0 10e3/uL    Narrative    The sex of this patient cannot be reliably determined based on discrepancies in demographics (legal sex, sex assigned at birth, gender identity).  Both male and female reference ranges are provided where applicable.  Careful evaluation of the patient s results as compared to the gender specific reference intervals is required in this setting.        ASSESSMENT/PLAN:         (Z23) Need for vaccination  (primary encounter diagnosis)  Comment:   Plan: COVID-19,PF,PFIZER (12+ Yrs GRAY LABEL)            (K62.5) Blood per rectum  Comment: Proceed with colonoscopy  Plan: Adult Gastro Ref - Procedure Only, CBC and         Differential            (Z02.89) Health examination of defined subpopulation  Comment: Labs satisfactory  Plan: Comprehensive Metabolic Panel, Lipid Panel          Results for orders placed or performed in visit on 04/20/22   Comprehensive Metabolic Panel     Status: None   Result Value Ref Range    Sodium 138 134 - 144 mmol/L    Potassium 4.2 3.5 - 5.1 mmol/L    Chloride 104 98 - 107 mmol/L    Carbon Dioxide (CO2) 26 21 - 31 mmol/L    Anion Gap 8 3 - 14 mmol/L    Urea Nitrogen 7 7 - 25 mg/dL     Creatinine 1.02 0.60 - 1.30 mg/dL    Calcium 9.7 8.6 - 10.3 mg/dL    Glucose 95 70 - 105 mg/dL    Alkaline Phosphatase 73 34 - 104 U/L    AST 18 13 - 39 U/L    ALT 23 7 - 52 U/L    Protein Total 6.8 6.4 - 8.9 g/dL    Albumin 4.4 3.5 - 5.7 g/dL    Bilirubin Total 0.3 0.3 - 1.0 mg/dL    GFR Estimate      Narrative    The sex of this patient cannot be reliably determined based on discrepancies in demographics (legal sex, sex assigned at birth, gender identity).  Both male and female reference ranges are provided where applicable.  Careful evaluation of the patient s results as compared to the gender specific reference intervals is required in this setting.    Lipid Panel     Status: Abnormal   Result Value Ref Range    Cholesterol 174 <200 mg/dL    Triglycerides 288 (H) <150 mg/dL    Direct Measure HDL 31 23 - 92 mg/dL    LDL Cholesterol Calculated 85 <=100 mg/dL    Non HDL Cholesterol 143 (H) <130 mg/dL    Patient Fasting > 8hrs? Unknown     Narrative    The sex of this patient cannot be reliably determined based on discrepancies in demographics (legal sex, sex assigned at birth, gender identity).  Both male and female reference ranges are provided where applicable.  Careful evaluation of the patient s results as compared to the gender specific reference intervals is required in this setting.   Cholesterol  Desirable:  <200 mg/dL    Triglycerides  Normal:  Less than 150 mg/dL  Borderline High:  150-199 mg/dL  High:  200-499 mg/dL  Very High:  Greater than or equal to 500 mg/dL    Direct Measure HDL  Female:  Greater than or equal to 50 mg/dL   Male:  Greater than or equal to 40 mg/dL    LDL Cholesterol  Desirable:  <100mg/dL  Above Desirable:  100-129 mg/dL   Borderline High:  130-159 mg/dL   High:  160-189 mg/dL   Very High:  >= 190 mg/dL    Non HDL Cholesterol  Desirable:  130 mg/dL  Above Desirable:  130-159 mg/dL  Borderline High:  160-189 mg/dL  High:  190-219 mg/dL  Very High:  Greater than or equal to 220 mg/dL    CBC with platelets and differential     Status: None   Result Value Ref Range    WBC Count 5.8 4.0 - 11.0 10e3/uL    RBC Count 4.72 3.80 - 5.90 10e6/uL    Hemoglobin 14.4 11.7 - 17.7 g/dL    Hematocrit 41.1 35.0 - 53.0 %    MCV 87 78 - 100 fL    MCH 30.5 26.5 - 33.0 pg    MCHC 35.0 31.5 - 36.5 g/dL    RDW 11.9 10.0 - 15.0 %    Platelet Count 207 150 - 450 10e3/uL    % Neutrophils 63 %    % Lymphocytes 24 %    % Monocytes 8 %    % Eosinophils 3 %    % Basophils 1 %    % Immature Granulocytes 1 %    NRBCs per 100 WBC 0 <1 /100    Absolute Neutrophils 3.7 1.6 - 8.3 10e3/uL    Absolute Lymphocytes 1.4 0.8 - 5.3 10e3/uL    Absolute Monocytes 0.5 0.0 - 1.3 10e3/uL    Absolute Eosinophils 0.2 0.0 - 0.7 10e3/uL    Absolute Basophils 0.0 0.0 - 0.2 10e3/uL    Absolute Immature Granulocytes 0.1 <=0.4 10e3/uL    Absolute NRBCs 0.0 10e3/uL    Narrative    The sex of this patient cannot be reliably determined based on discrepancies in demographics (legal sex, sex assigned at birth, gender identity).  Both male and female reference ranges are provided where applicable.  Careful evaluation of the patient s results as compared to the gender specific reference intervals is required in this setting.    CBC and Differential     Status: None    Narrative    The following orders were created for panel order CBC and Differential.  Procedure                               Abnormality         Status                     ---------                               -----------         ------                     CBC with platelets and d...[[id number]]                      Final result                 Please view results for these tests on the individual orders.             Ervin Hassan MD  [facility]

## 2022-04-21 ENCOUNTER — TELEPHONE (OUTPATIENT)
Dept: SURGERY | Facility: OTHER | Age: 47
End: 2022-04-21
Payer: COMMERCIAL

## 2022-04-21 NOTE — TELEPHONE ENCOUNTER
Ok to schedule next routine available for diagnostic colonoscopy. Thanks! Ashley Garvin MD on 4/21/2022 at 12:26 PM

## 2022-04-21 NOTE — TELEPHONE ENCOUNTER
[facility] Diagnostic Referral    Patient has a referral for a colonoscopy with a diagnosis of Blood per rectum.    Please advise.    Thank you,Mony Rivas on 4/21/2022 at 8:33 AM

## 2022-05-02 DIAGNOSIS — Z12.11 ENCOUNTER FOR SCREENING COLONOSCOPY: Primary | ICD-10-CM

## 2022-05-02 NOTE — TELEPHONE ENCOUNTER
Screening Questions for the Scheduling of Screening Colonoscopies  (If Colonoscopy is diagnostic, Provider should review the chart before scheduling.)  Are you younger than 50 or older than 80?  YES  Do you take aspirin or fish oil?  NO (if yes, tell patient to stop 1 week prior to Colonoscopy)  Do you take warfarin (Coumadin), clopidogrel (Plavix), apixaban (Eliquis), dabigatram (Pradaxa), rivaroxaban (Xarelto) or any blood thinner? NO  Do you use oxygen at home?  NO  Do you have kidney disease? NO  Are you on dialysis? NO  Have you had a stroke or heart attack in the last year? NO  Have you had a stent in your heart or any blood vessel in the last year? NO  Have you had a transplant of any organ? NO  Have you had a colonoscopy or upper endoscopy (EGD) before? NO         Date of scheduled Colonoscopy. 06/16/2022  Provider [facility]

## 2022-06-10 RX ORDER — BISACODYL 5 MG/1
TABLET, DELAYED RELEASE ORAL
Qty: 2 TABLET | Refills: 0 | Status: ON HOLD | OUTPATIENT
Start: 2022-06-10 | End: 2022-06-16

## 2022-06-10 RX ORDER — POLYETHYLENE GLYCOL 3350, SODIUM CHLORIDE, SODIUM BICARBONATE, POTASSIUM CHLORIDE 420; 11.2; 5.72; 1.48 G/4L; G/4L; G/4L; G/4L
4000 POWDER, FOR SOLUTION ORAL ONCE
Qty: 4000 ML | Refills: 0 | Status: SHIPPED | OUTPATIENT
Start: 2022-06-10 | End: 2022-06-10

## 2022-06-13 ENCOUNTER — ALLIED HEALTH/NURSE VISIT (OUTPATIENT)
Dept: FAMILY MEDICINE | Facility: OTHER | Age: 47
End: 2022-06-13
Attending: SURGERY
Payer: COMMERCIAL

## 2022-06-13 DIAGNOSIS — Z12.11 ENCOUNTER FOR SCREENING COLONOSCOPY: ICD-10-CM

## 2022-06-13 PROCEDURE — U0005 INFEC AGEN DETEC AMPLI PROBE: HCPCS | Mod: ZL

## 2022-06-13 PROCEDURE — C9803 HOPD COVID-19 SPEC COLLECT: HCPCS

## 2022-06-13 NOTE — PROGRESS NOTES
Patient here for Covid Testing. Pre op 6/16/2022 [facility].    Elisabeth Rae MA on 6/13/2022 at 10:59 AM

## 2022-06-14 LAB — SARS-COV-2 RNA RESP QL NAA+PROBE: NEGATIVE

## 2022-06-16 ENCOUNTER — HOSPITAL ENCOUNTER (OUTPATIENT)
Facility: OTHER | Age: 47
Discharge: HOME | End: 2022-06-16
Attending: SURGERY | Admitting: SURGERY
Payer: COMMERCIAL

## 2022-06-16 ENCOUNTER — ANESTHESIA (OUTPATIENT)
Dept: SURGERY | Facility: OTHER | Age: 47
End: 2022-06-16
Payer: COMMERCIAL

## 2022-06-16 ENCOUNTER — ANESTHESIA EVENT (OUTPATIENT)
Dept: SURGERY | Facility: OTHER | Age: 47
End: 2022-06-16
Payer: COMMERCIAL

## 2022-06-16 VITALS
HEART RATE: 70 BPM | WEIGHT: 265 LBS | SYSTOLIC BLOOD PRESSURE: 133 MMHG | RESPIRATION RATE: 18 BRPM | TEMPERATURE: 97.4 F | OXYGEN SATURATION: 94 % | DIASTOLIC BLOOD PRESSURE: 82 MMHG | HEIGHT: 69 IN | BODY MASS INDEX: 39.25 KG/M2

## 2022-06-16 PROCEDURE — 45378 DIAGNOSTIC COLONOSCOPY: CPT | Performed by: SURGERY

## 2022-06-16 PROCEDURE — 250N000009 HC RX 250: Performed by: NURSE ANESTHETIST, CERTIFIED REGISTERED

## 2022-06-16 PROCEDURE — G0121 COLON CA SCRN NOT HI RSK IND: HCPCS | Performed by: SURGERY

## 2022-06-16 PROCEDURE — 250N000011 HC RX IP 250 OP 636: Performed by: NURSE ANESTHETIST, CERTIFIED REGISTERED

## 2022-06-16 PROCEDURE — 258N000003 HC RX IP 258 OP 636: Performed by: SURGERY

## 2022-06-16 PROCEDURE — 45378 DIAGNOSTIC COLONOSCOPY: CPT | Performed by: NURSE ANESTHETIST, CERTIFIED REGISTERED

## 2022-06-16 PROCEDURE — 999N000010 HC STATISTIC ANES STAT CODE-CRNA PER MINUTE: Performed by: SURGERY

## 2022-06-16 RX ORDER — NALOXONE HYDROCHLORIDE 0.4 MG/ML
0.4 INJECTION, SOLUTION INTRAMUSCULAR; INTRAVENOUS; SUBCUTANEOUS
Status: DISCONTINUED | OUTPATIENT
Start: 2022-06-16 | End: 2022-06-16 | Stop reason: HOSPADM

## 2022-06-16 RX ORDER — FLUMAZENIL 0.1 MG/ML
0.2 INJECTION, SOLUTION INTRAVENOUS
Status: DISCONTINUED | OUTPATIENT
Start: 2022-06-16 | End: 2022-06-16 | Stop reason: HOSPADM

## 2022-06-16 RX ORDER — PROPOFOL 10 MG/ML
INJECTION, EMULSION INTRAVENOUS PRN
Status: DISCONTINUED | OUTPATIENT
Start: 2022-06-16 | End: 2022-06-16

## 2022-06-16 RX ORDER — NALOXONE HYDROCHLORIDE 0.4 MG/ML
0.2 INJECTION, SOLUTION INTRAMUSCULAR; INTRAVENOUS; SUBCUTANEOUS
Status: DISCONTINUED | OUTPATIENT
Start: 2022-06-16 | End: 2022-06-16 | Stop reason: HOSPADM

## 2022-06-16 RX ORDER — PROPOFOL 10 MG/ML
INJECTION, EMULSION INTRAVENOUS CONTINUOUS PRN
Status: DISCONTINUED | OUTPATIENT
Start: 2022-06-16 | End: 2022-06-16

## 2022-06-16 RX ORDER — LIDOCAINE 40 MG/G
CREAM TOPICAL
Status: DISCONTINUED | OUTPATIENT
Start: 2022-06-16 | End: 2022-06-16 | Stop reason: HOSPADM

## 2022-06-16 RX ORDER — SODIUM CHLORIDE, SODIUM LACTATE, POTASSIUM CHLORIDE, CALCIUM CHLORIDE 600; 310; 30; 20 MG/100ML; MG/100ML; MG/100ML; MG/100ML
INJECTION, SOLUTION INTRAVENOUS CONTINUOUS
Status: DISCONTINUED | OUTPATIENT
Start: 2022-06-16 | End: 2022-06-16 | Stop reason: HOSPADM

## 2022-06-16 RX ORDER — LIDOCAINE HYDROCHLORIDE 20 MG/ML
INJECTION, SOLUTION INFILTRATION; PERINEURAL PRN
Status: DISCONTINUED | OUTPATIENT
Start: 2022-06-16 | End: 2022-06-16

## 2022-06-16 RX ADMIN — LIDOCAINE HYDROCHLORIDE 100 MG: 20 INJECTION, SOLUTION INFILTRATION; PERINEURAL at 09:08

## 2022-06-16 RX ADMIN — PROPOFOL 140 MCG/KG/MIN: 10 INJECTION, EMULSION INTRAVENOUS at 09:09

## 2022-06-16 RX ADMIN — SODIUM CHLORIDE, POTASSIUM CHLORIDE, SODIUM LACTATE AND CALCIUM CHLORIDE 30 ML/HR: 600; 310; 30; 20 INJECTION, SOLUTION INTRAVENOUS at 08:22

## 2022-06-16 RX ADMIN — PROPOFOL 150 MG: 10 INJECTION, EMULSION INTRAVENOUS at 09:09

## 2022-06-16 NOTE — OR NURSING
Patient and responsible adult given discharge instructions with no questions regarding instructions. Andreia score 18. Pain level 0/10.  Discharged from unit via ambulatory. Patient discharged to home.

## 2022-06-16 NOTE — ANESTHESIA POSTPROCEDURE EVALUATION
Patient: Tenzin Ca    Procedure: Procedure(s):  COLONOSCOPY       Anesthesia Type:  MAC    Note:  Disposition: Outpatient   Postop Pain Control: Uneventful            Sign Out: Well controlled pain   PONV: No   Neuro/Psych: Uneventful            Sign Out: Acceptable/Baseline neuro status   Airway/Respiratory: Uneventful            Sign Out: Acceptable/Baseline resp. status   CV/Hemodynamics: Uneventful            Sign Out: Acceptable CV status; No obvious hypovolemia; No obvious fluid overload   Other NRE: NONE   DID A NON-ROUTINE EVENT OCCUR? No           Last vitals:  Vitals Value Taken Time   /98 06/16/22 1010   Temp     Pulse 70 06/16/22 1010   Resp 18 06/16/22 0940   SpO2 96 % 06/16/22 1016   Vitals shown include unvalidated device data.    Electronically Signed By: Vince Valerio CRNA, APRN CRNA  June 16, 2022  10:18 AM

## 2022-06-16 NOTE — ANESTHESIA PREPROCEDURE EVALUATION
Anesthesia Pre-Procedure Evaluation    Patient: Tenzin Ca   MRN: [id number] : 1975        Procedure : Procedure(s):  COLONOSCOPY          Past Medical History:   Diagnosis Date     Mental disorder     Disability related to mental illness     Personal history of other mental and behavioral disorders     2016      Past Surgical History:   Procedure Laterality Date     ARTHROSCOPY KNEE      Left Knee Arthroscopy and Partial Meniscectomy and Chondroplasty      No Known Allergies   Social History     Tobacco Use     Smoking status: Never Smoker     Smokeless tobacco: Never Used   Substance Use Topics     Alcohol use: No     Alcohol/week: 0.0 standard drinks      Wt Readings from Last 1 Encounters:   22 120.2 kg (265 lb)        Anesthesia Evaluation   Pt has had prior anesthetic.     No history of anesthetic complications       ROS/MED HX  ENT/Pulmonary:  - neg pulmonary ROS     Neurologic:  - neg neurologic ROS     Cardiovascular:     (+) Dyslipidemia -----    METS/Exercise Tolerance: >4 METS    Hematologic:  - neg hematologic  ROS     Musculoskeletal: Comment: Cervical Spine fusion       GI/Hepatic:     (+) bowel prep,     Renal/Genitourinary:  - neg Renal ROS     Endo:     (+) Obesity,     Psychiatric/Substance Use: Comment: Psychosis     (+) psychiatric history depression, anxiety and schizophrenia     Infectious Disease:  - neg infectious disease ROS     Malignancy:  - neg malignancy ROS     Other:      (+) , H/O Chronic Pain,        Physical Exam    Airway        Mallampati: II   TM distance: > 3 FB   Neck ROM: full   Mouth opening: > 3 cm    Respiratory Devices and Support         Dental     Comment: Front teeth are very short        Cardiovascular   cardiovascular exam normal       Rhythm and rate: regular and normal     Pulmonary   pulmonary exam normal        breath sounds clear to auscultation           OUTSIDE LABS:  CBC:   Lab Results   Component Value Date    WBC 5.8 2022    WBC 7.1  09/12/2021    HGB 14.4 04/20/2022    HGB 13.7 09/12/2021    HCT 41.1 04/20/2022    HCT 39.2 (L) 09/12/2021     04/20/2022     09/12/2021     BMP:   Lab Results   Component Value Date     04/20/2022     09/04/2021    POTASSIUM 4.2 04/20/2022    POTASSIUM 3.9 09/04/2021    CHLORIDE 104 04/20/2022    CHLORIDE 104 09/04/2021    CO2 26 04/20/2022    CO2 30 09/04/2021    BUN 7 04/20/2022    BUN 8 09/04/2021    CR 1.02 04/20/2022    CR 1.05 09/04/2021    GLC 95 04/20/2022     09/04/2021     COAGS: No results found for: PTT, INR, FIBR  POC: No results found for: BGM, HCG, HCGS  HEPATIC:   Lab Results   Component Value Date    ALBUMIN 4.4 04/20/2022    PROTTOTAL 6.8 04/20/2022    ALT 23 04/20/2022    AST 18 04/20/2022    ALKPHOS 73 04/20/2022    BILITOTAL 0.3 04/20/2022    RIHSI 22 12/02/2019     OTHER:   Lab Results   Component Value Date    LACT 1.9 12/02/2019    A1C 5.7 10/21/2020    MANDO 9.7 04/20/2022    MAG 2.0 10/21/2020    LIPASE 11 12/02/2019    AMYLASE 34 08/15/2016    TSH 2.94 07/03/2015    T4 0.67 11/10/2020    CRP 2.8 09/04/2021    SED 11 09/04/2021       Anesthesia Plan    ASA Status:  2   NPO Status:  NPO Appropriate    Anesthesia Type: MAC.              Consents    Anesthesia Plan(s) and associated risks, benefits, and realistic alternatives discussed. Questions answered and patient/representative(s) expressed understanding.     - Discussed: Risks, Benefits and Alternatives for BOTH SEDATION and the PROCEDURE were discussed     - Discussed with:  Patient      - Extended Intubation/Ventilatory Support Discussed: No.      - Patient is DNR/DNI Status: No    Use of blood products discussed: No .     Postoperative Care            Comments:                CORBIN Barrera CRNA

## 2022-06-16 NOTE — OP NOTE
PROCEDURE NOTE    SURGEON:Frank Toro MD    PRE-OP DIAGNOSIS:  Screening Colonoscopy      POST-OP DIAGNOSIS: Normal     PROCEDURE: Colonoscopy    SPECIMEN:       * No specimens in log *     ANESTHESIA:  Monitor Anesthesia Care CRNA Independent: Rogelio Mallory APRN CRNA; Vince Valerio APRN CRNA   Coverage requested     ESTIMATED BLOOD LOSS: none    COMPLICATIONS:  None    INDICATION FOR THE PROCEDURE: The patient is a 46 year old adult. The patient presents with need for screening. I explained to the patient the risks, benefits and alternatives to screening colonoscopy for evaluating for cancer or polyps. We discussed the risks including bleeding, perforation, potential inability to reach the cecum and the risks of sedation. The patient's questions were answered and the patient wished to proceed. Informed consent paperwork was completed.    PROCEDURE: The patient was taken to the endoscopy suite. Appropriate monitors were attached. The patient was placed in the left lateral decubitus position. Timeout was performed confirming the patient's identity and procedure to be performed.  After appropriate sedation was confirmed, digital rectal exam was performed.  There was normal tone and no gross abnormality was noted.  The lubricated colonoscope was introduced into the anus the colon was insufflated with air. The prep quality was adequate. Under direct visualization the scope was advanced to the cecum. The mucosa of the colon was inspected while withdrawing the scope.  There was melanosis coli in the cecum.  The scope was retroflexed in the rectum and the anorectal junction was inspected. No abnormalities were noted. The scope was returned to aneutral position and the colon was decompressed. The scope was removed. The patient tolerated the procedure with no immediately apparent complication. The patient was taken to recovery in stable condition.    FOLLOW UP: RECOMMEND high fiber diet, follow-up in 10  years    Frank Toro MD on 6/16/2022 at 9:31 AM

## 2022-06-16 NOTE — ANESTHESIA CARE TRANSFER NOTE
Patient: Tenzin Ca    Procedure: Procedure(s):  COLONOSCOPY       Diagnosis: Screening for colon cancer [Z12.11]  Diagnosis Additional Information: No value filed.    Anesthesia Type:   MAC     Note:    Oropharynx: oropharynx clear of all foreign objects and spontaneously breathing  Level of Consciousness: drowsy  Oxygen Supplementation: nasal cannula  Level of Supplemental Oxygen (L/min / FiO2): 2  Independent Airway: airway patency satisfactory and stable  Dentition: dentition unchanged  Vital Signs Stable: post-procedure vital signs reviewed and stable  Report to RN Given: handoff report given  Patient transferred to: Phase II    Handoff Report: Identifed the Patient, Identified the Reponsible Provider, Reviewed the pertinent medical history, Discussed the surgical course, Reviewed Intra-OP anesthesia mangement and issues during anesthesia, Set expectations for post-procedure period and Allowed opportunity for questions and acknowledgement of understanding      Vitals:  Vitals Value Taken Time   BP     Temp     Pulse     Resp     SpO2         Electronically Signed By: Vince Valerio CRNA, APRN CRNA  June 16, 2022  9:35 AM

## 2022-06-16 NOTE — H&P
PRE-PROCEDURE NOTE    CHIEFCOMPLAINT / REASON FOR PROCEDURE:  Rectal bleeding.    PERTINENT HISTORY   Patient is due for colonoscopy and has had rectal bleeding. Previous colonoscopy None. No family history of colon polyps or colon cancer.    Past Medical History:   Diagnosis Date     Mental disorder     Disability related to mental illness     Personal history of other mental and behavioral disorders     2016       Past Surgical History:   Procedure Laterality Date     ARTHROSCOPY KNEE      Left Knee Arthroscopy and Partial Meniscectomy and Chondroplasty         Other:  None  Bleeding tendencies: No    Relevant Family History:  None     Relevant Social History:  None     10 point ROS of systems including Constitutional, Eyes, Respiratory, Cardiovascular, Gastroenterology, Genitourinary, Integumentary, Muscularskeletal, Psychiatric were all negative except for pertinent positives noted in my HPI.      ALLERGIES/SENSITIVITIES: No Known Allergies     CURRENT MEDICATIONS:    No current facility-administered medications on file prior to encounter.  ARIPiprazole (ABILIFY) 20 MG tablet, Take 20 mg by mouth every morning  clonazePAM (KLONOPIN) 1 MG tablet, TAKE 1 TABLET BY MOUTH 3 TIMES DAILY AS NEEDED FOR SEVERE ANXIETY AND VOICES  OXcarbazepine (TRILEPTAL) 300 MG tablet, Take 300 mg by mouth every morning  OXcarbazepine (TRILEPTAL) 600 MG tablet, Take 600 mg by mouth At Bedtime  polyethylene glycol (MIRALAX) 17 GM/Dose powder, Take 17 g by mouth daily  prazosin (MINIPRESS) 1 MG capsule, Take 1 capsule by mouth nightly as needed  senna-docusate (STOOL SOFTENER/LAXATIVE) 8.6-50 MG tablet, Take 1 tablet by mouth 2 times daily  zolpidem (AMBIEN) 10 MG tablet, Take 10 mg by mouth At Bedtime            PRE-SEDATION ASSESSMENT:    LUNGS:  CTA B/L, no wheezing or crackles.  Heart & CV:  RRR no murmur.  Intact distal pulses, good cap refill.    Comment(s):      IMPRESSION: 46 year old adult in need of colonoscopy.    PLAN:  I  discussed screening colonoscopy with the patient. Anesthesia coverage requested.    Frank Toro MD    6/16/2022 8:05 AM

## 2022-06-16 NOTE — DISCHARGE INSTRUCTIONS
[facility]  Adult Discharge Orders & Instructions                 For 12 hours after surgery  Get plenty of rest.  A responsible adult must stay with you for at least 12 hours after you leave the hospital.   You may feel lightheaded.  IF so, sit for a few minutes before standing.  Have someone help you get up.   You may have a slight fever. Call the doctor if your fever is over 101 F (38.3 C) (taken under the tongue) or lasts longer than 24 hours.  You may have a dry mouth, a sore throat, muscle aches or trouble sleeping.  These should go away after 24 hours.  Do not make important or legal decisions.  6.   Do not drive or use heavy equipment.  If you have weakness or tingling after 12 hours, don't drive or use heavy equipment until this feeling goes away.    To contact a doctor please call,  [phone] Your doctor recommends that you eat 25 to 30 Grams of fiber daily. The following are some examples of fiber amounts in different foods.    Fruits: Apple (with skin) 1 medium = 4.4 Grams   Banana      1 medium = 3.1 Grams   Oranges     1 orange = 3.1 Grams   Prunes     1 cup, pitted = 12.4 Grams    Juices: Apple, unsweetened w/ added ascorbic acid  1 cup = 0.5 Grams    Grapefruit, white, canned,sweetened  1 cup = 0.2 Grams    Grape, unsweetened w/ added ascorbic acid  1 cup = 0.5 Grams    Orange     1 cup = 0.7 Grams    Vegetables:   Cooked: Green Beans   1 cup = 4.0 Grams       Carrots   1/2 cup sliced = 2.3 Grams       Peas       1 cup = 8.8 Grams       Potato (baked, with skin)  1 medium = 3.8 Grams    Raw: Cucumber (with peel)  1 cucumber = 1.5 Grams            Lettuce     1 cup shredded = 0.5 Grams            Tomato   1 medium tomato = 1.5 Grams            Spinach  1 cup = 0.7 Grams    Legumes: Baked beans, canned, no salt added  1 cup = 13.9 Grams         Kidney Beans, canned  1 cup = 13.6 Grams         Persaud Beans, canned     1 cup = 11.6 Grams         Lentils, boiled   1 cup = 15.6  Grams    Breads, Pastas, Flours: Bran muffins   1 medium muffin = 5.2 Grams           Oatmeal, cooked  1 cup = 4.0 Grams           White Bread   1 slice = 0.6 Grams           Whole- wheat bread = 1.9 Grams    Pasta and rice, cooked: Macaroni  1 cup = 2.5 Grams           Rice, Brown  1 cup = 3.5 Grams           Rice, white   1 cup = 0.6 Grams           Spaghetti (regular) 1 cup = 2.5 Grams    Nuts: Almonds   1 cup = 17.4 Grams            Peanuts    1 cup = 12.4 Grams           WHEN YOU ARE BACK HOME:  Plan to rest for an hour or two after you get home.  You may have some cramping or pressure until you pass gas.  You may resume your regular medications.  Eat a small, light meal at first, and then gradually return to normal meal sizes.  You will be contacted the next day to see how you are doing.  If you had a polyp removed:  Slight bleeding may occur.  You may have a slight blood stain on the toilet paper after a bowel movement.  To lessen the chance of bleeding, avoid heavy exercise for ONE WEEK.  This includes heavy lifting, vigorous sport activities, and heavy physical labor.  You may resume your normal sexual activity.    Avoid aspirin or aspirin products if instructed by your doctor.  If there is a polyp or biopsy, you will be contacted with results within one week.     WHAT TO WATCH FOR:  Problems rarely occur after the exam; however, it is important for you to watch for early signs of possible problems.  If you have   Unusual pain in your abdomen  Nausea and vomiting that persists  Excessive bleeding  Black or bloody bowel movements  Fever or temperature above 100.6 F  Please call your doctor ([facility] [phone]) or go to the nearest hospital emergency room.

## 2022-07-25 ENCOUNTER — OFFICE VISIT (OUTPATIENT)
Dept: FAMILY MEDICINE | Facility: OTHER | Age: 47
End: 2022-07-25
Attending: PHYSICIAN ASSISTANT
Payer: COMMERCIAL

## 2022-07-25 VITALS
TEMPERATURE: 97.6 F | SYSTOLIC BLOOD PRESSURE: 118 MMHG | RESPIRATION RATE: 14 BRPM | DIASTOLIC BLOOD PRESSURE: 76 MMHG | OXYGEN SATURATION: 96 % | WEIGHT: 263 LBS | BODY MASS INDEX: 38.95 KG/M2 | HEIGHT: 69 IN | HEART RATE: 84 BPM

## 2022-07-25 DIAGNOSIS — G89.29 CHRONIC BILATERAL LOW BACK PAIN WITHOUT SCIATICA: Primary | ICD-10-CM

## 2022-07-25 DIAGNOSIS — M77.11 RIGHT LATERAL EPICONDYLITIS: ICD-10-CM

## 2022-07-25 DIAGNOSIS — M54.50 CHRONIC BILATERAL LOW BACK PAIN WITHOUT SCIATICA: Primary | ICD-10-CM

## 2022-07-25 PROBLEM — Z98.1 S/P CERVICAL SPINAL FUSION: Status: ACTIVE | Noted: 2021-10-14

## 2022-07-25 PROBLEM — F20.0 PARANOID SCHIZOPHRENIA (H): Status: ACTIVE | Noted: 2021-09-04

## 2022-07-25 PROBLEM — M48.02 MYELOPATHY CONCURRENT WITH AND DUE TO SPINAL STENOSIS OF CERVICAL REGION (H): Status: ACTIVE | Noted: 2021-09-06

## 2022-07-25 PROBLEM — G99.2 MYELOPATHY CONCURRENT WITH AND DUE TO SPINAL STENOSIS OF CERVICAL REGION (H): Status: ACTIVE | Noted: 2021-09-06

## 2022-07-25 PROCEDURE — G0463 HOSPITAL OUTPT CLINIC VISIT: HCPCS

## 2022-07-25 PROCEDURE — 99213 OFFICE O/P EST LOW 20 MIN: CPT | Performed by: PHYSICIAN ASSISTANT

## 2022-07-25 ASSESSMENT — PAIN SCALES - GENERAL: PAINLEVEL: SEVERE PAIN (7)

## 2022-07-25 NOTE — PROGRESS NOTES
Assessment & Plan     1. Chronic bilateral low back pain without sciatica  Patient with longstanding history of chronic bilateral low back pain, x-ray updated 2/22 subha degenerative change.  Patient wanting guidance with physical activity.  Discussed with patient importance of low impact activity including walking, biking, swimming, stretching, activity as tolerated for management.  Offered at home stretches and exercises packet but patient declined.  Follow-up as needed.    2. Right lateral epicondylitis  Symptoms and exam consistent with lateral epicondylitis.  Encourage symptomatic management with Tylenol or ibuprofen, icing, stretching, activity as tolerated.  May try brace over-the-counter.  Follow-up as needed.    No follow-ups on file.    Micaela Okeefe PA-C  [facility]   Tenzin is a 46 year old, presenting for the following health issues:  Back Pain and Arm Pain      History of Present Illness       Reason for visit:  Back pain and right arm pain    He eats 0-1 servings of fruits and vegetables daily.He consumes 5 sweetened beverage(s) daily.He exercises with enough effort to increase his heart rate 9 or less minutes per day.  He exercises with enough effort to increase his heart rate 4 days per week.   He is taking medications regularly.    Here for follow-up on chronic bilateral low back pain.  Patient has struggled with this for quite some time, previously has done well with muscle relaxers.  Most recent x-ray completed 2/2022 showing moderate disc disease, degenerative changes.  Patient is wondering what type of activities he can do to try to help with this pain.  He admits that he is not very physically active.  He would like guidance as to what he can be doing without hurting his back worse.    Also reports that he developed right lateral elbow pain approximately 1 week ago.  No known injury.  Does report that he has had his elbow several times while he is  "sleeping.  He has tried management with ibuprofen and icing.  Patient is a bowler.  Pain is worse with flexing of his forearm.  No overlying skin changes, numbness, weakness.      PAST MEDICAL HISTORY:   Past Medical History:   Diagnosis Date     Mental disorder     Disability related to mental illness     Personal history of other mental and behavioral disorders     2016       PAST SURGICAL HISTORY:   Past Surgical History:   Procedure Laterality Date     ARTHROSCOPY KNEE      Left Knee Arthroscopy and Partial Meniscectomy and Chondroplasty     COLONOSCOPY N/A 6/16/2022    Procedure: COLONOSCOPY;  Surgeon: Frank Toro MD;  Location:  OR       FAMILY HISTORY:   Family History   Problem Relation Age of Onset     Substance Abuse Mother         Alcohol/Drug     Alcoholism Mother         Alcoholism     Substance Abuse Brother         Alcohol/Drug     Alcoholism Brother         Alcoholism     Substance Abuse Sister         Alcohol/Drug     Alcoholism Sister         Alcoholism       SOCIAL HISTORY:   Social History     Tobacco Use     Smoking status: Passive Smoke Exposure - Never Smoker     Smokeless tobacco: Never Used   Substance Use Topics     Alcohol use: No     Alcohol/week: 0.0 standard drinks      No Known Allergies  Current Outpatient Medications   Medication     ARIPiprazole (ABILIFY) 20 MG tablet     clonazePAM (KLONOPIN) 1 MG tablet     OXcarbazepine (TRILEPTAL) 300 MG tablet     OXcarbazepine (TRILEPTAL) 600 MG tablet     polyethylene glycol (MIRALAX) 17 GM/Dose powder     prazosin (MINIPRESS) 1 MG capsule     senna-docusate (STOOL SOFTENER/LAXATIVE) 8.6-50 MG tablet     sildenafil (REVATIO) 20 MG tablet     zolpidem (AMBIEN) 10 MG tablet     No current facility-administered medications for this visit.         Review of Systems   Per HPI        Objective    /76   Pulse 84   Temp 97.6  F (36.4  C)   Resp 14   Ht 1.74 m (5' 8.5\")   Wt 119.3 kg (263 lb)   SpO2 96%   Breastfeeding No   BMI " 39.41 kg/m    Body mass index is 39.41 kg/m .  Physical Exam   General: Pleasant, in no apparent distress.  Musculoskeletal: Tenderness palpation over lateral epicondyle on right.  Pain worsened with extension of right wrist.  Full range of motion of fingers, hand, wrist, elbow on right.  No overlying skin changes.  Skin: No jaundice, pallor, rashes, or lesions.  Psych: Appropriate mood and affect.

## 2022-07-25 NOTE — NURSING NOTE
Patient presents to clinic with low back pain that he has had since neck surgery 9/2021 and right elbow pain that started last week, no known injury.  Julianna Jenkins LPN ....................  7/25/2022   3:23 PM

## 2022-08-01 ENCOUNTER — NURSE TRIAGE (OUTPATIENT)
Dept: FAMILY MEDICINE | Facility: OTHER | Age: 47
End: 2022-08-01

## 2022-08-01 ENCOUNTER — HOSPITAL ENCOUNTER (EMERGENCY)
Facility: OTHER | Age: 47
Discharge: HOME | End: 2022-08-01
Attending: PHYSICIAN ASSISTANT | Admitting: PHYSICIAN ASSISTANT
Payer: COMMERCIAL

## 2022-08-01 ENCOUNTER — APPOINTMENT (OUTPATIENT)
Dept: CT IMAGING | Facility: OTHER | Age: 47
End: 2022-08-01
Attending: PHYSICIAN ASSISTANT
Payer: COMMERCIAL

## 2022-08-01 VITALS
SYSTOLIC BLOOD PRESSURE: 141 MMHG | DIASTOLIC BLOOD PRESSURE: 93 MMHG | TEMPERATURE: 98.3 F | OXYGEN SATURATION: 97 % | RESPIRATION RATE: 16 BRPM | WEIGHT: 279 LBS | BODY MASS INDEX: 41.8 KG/M2 | HEART RATE: 72 BPM

## 2022-08-01 DIAGNOSIS — M54.50 LUMBAR BACK PAIN: ICD-10-CM

## 2022-08-01 DIAGNOSIS — R20.0 NUMBNESS: ICD-10-CM

## 2022-08-01 PROCEDURE — 96372 THER/PROPH/DIAG INJ SC/IM: CPT | Performed by: PHYSICIAN ASSISTANT

## 2022-08-01 PROCEDURE — 250N000013 HC RX MED GY IP 250 OP 250 PS 637: Performed by: PHYSICIAN ASSISTANT

## 2022-08-01 PROCEDURE — 72131 CT LUMBAR SPINE W/O DYE: CPT

## 2022-08-01 PROCEDURE — 99283 EMERGENCY DEPT VISIT LOW MDM: CPT | Performed by: PHYSICIAN ASSISTANT

## 2022-08-01 PROCEDURE — 99284 EMERGENCY DEPT VISIT MOD MDM: CPT | Mod: 25 | Performed by: PHYSICIAN ASSISTANT

## 2022-08-01 PROCEDURE — 250N000011 HC RX IP 250 OP 636: Performed by: PHYSICIAN ASSISTANT

## 2022-08-01 PROCEDURE — 99284 EMERGENCY DEPT VISIT MOD MDM: CPT | Performed by: PHYSICIAN ASSISTANT

## 2022-08-01 RX ORDER — LIDOCAINE 50 MG/G
2 PATCH TOPICAL EVERY 24 HOURS
Qty: 20 PATCH | Refills: 0 | Status: SHIPPED | OUTPATIENT
Start: 2022-08-01 | End: 2022-08-11

## 2022-08-01 RX ORDER — PREDNISONE 20 MG/1
TABLET ORAL
Qty: 15 TABLET | Refills: 0 | Status: SHIPPED | OUTPATIENT
Start: 2022-08-01 | End: 2023-09-29

## 2022-08-01 RX ORDER — LIDOCAINE 50 MG/G
2 PATCH TOPICAL ONCE
Status: DISCONTINUED | OUTPATIENT
Start: 2022-08-01 | End: 2022-08-01 | Stop reason: HOSPADM

## 2022-08-01 RX ORDER — KETOROLAC TROMETHAMINE 30 MG/ML
60 INJECTION, SOLUTION INTRAMUSCULAR; INTRAVENOUS ONCE
Status: COMPLETED | OUTPATIENT
Start: 2022-08-01 | End: 2022-08-01

## 2022-08-01 RX ORDER — METHYLPREDNISOLONE SOD SUCC 125 MG
60 VIAL (EA) INJECTION ONCE
Status: COMPLETED | OUTPATIENT
Start: 2022-08-01 | End: 2022-08-01

## 2022-08-01 RX ADMIN — METHYLPREDNISOLONE SODIUM SUCCINATE 60 MG: 125 INJECTION, POWDER, FOR SOLUTION INTRAMUSCULAR; INTRAVENOUS at 18:46

## 2022-08-01 RX ADMIN — LIDOCAINE 5% 2 PATCH: 700 PATCH TOPICAL at 18:50

## 2022-08-01 RX ADMIN — KETOROLAC TROMETHAMINE 60 MG: 30 INJECTION, SOLUTION INTRAMUSCULAR at 18:45

## 2022-08-01 ASSESSMENT — ENCOUNTER SYMPTOMS
CONFUSION: 0
FLANK PAIN: 0
FACIAL ASYMMETRY: 0
BACK PAIN: 1
SEIZURES: 0
NUMBNESS: 1
STRIDOR: 0
FEVER: 0
TREMORS: 0
EYE PAIN: 0
FACIAL SWELLING: 0
AGITATION: 0
ABDOMINAL PAIN: 0

## 2022-08-01 NOTE — TELEPHONE ENCOUNTER
"Due to pt having severe pain along with numbness to lower extremities and is unable to feel when is is voiding or having a bowel movement, also reports not even able to feel when he passes gas, feel it appropriate to send to the ER.  Pt does not drive and reports it is to painful to go in the taxi or bus he was instructed to call 911 for a non emergency transport to the ER.  Called the ER and spoke with registration and they are aware of his need to be seen in the ER.  Ashley Velazco RN on 8/1/2022 at 3:44 PM    Reason for Disposition    Numbness (loss of sensation) in groin or rectal area    Additional Information    Negative: Passed out (i.e., fainted, collapsed and was not responding)    Negative: Shock suspected (e.g., cold/pale/clammy skin, too weak to stand, low BP, rapid pulse)    Negative: Sounds like a life-threatening emergency to the triager    Negative: Major injury to the back (e.g., MVA, fall > 10 feet or 3 meters, penetrating injury, etc.)    Negative: Pain in the upper back over the ribs (rib cage) that radiates (travels) into the chest    Negative: Pain in the upper back over the ribs (rib cage) and worsened by coughing (or clearly increases with breathing)    Negative: SEVERE back pain of sudden onset and age > 60    Negative: SEVERE abdominal pain (e.g., excruciating)    Negative: Abdominal pain and age > 60    Negative: Unable to urinate (or only a few drops) and bladder feels very full    Negative: Loss of bladder or bowel control (urine or bowel incontinence; wetting self, leaking stool) of new onset    Answer Assessment - Initial Assessment Questions  1. ONSET: \"When did the pain begin?\"       7/29/22  2. LOCATION: \"Where does it hurt?\" (upper, mid or lower back)      Lower  3. SEVERITY: \"How bad is the pain?\"  (e.g., Scale 1-10; mild, moderate, or severe)    - MILD (1-3): doesn't interfere with normal activities     - MODERATE (4-7): interferes with normal activities or awakens from " "sleep     - SEVERE (8-10): excruciating pain, unable to do any normal activities       10  4. PATTERN: \"Is the pain constant?\" (e.g., yes, no; constant, intermittent)       Constant  5. RADIATION: \"Does the pain shoot into your legs or elsewhere?\"      Reports both legs tingle when he uses the bathroom and he can not feel himself urinate or have bowel movements  6. CAUSE:  \"What do you think is causing the back pain?\"       Unsre  7. BACK OVERUSE:  \"Any recent lifting of heavy objects, strenuous work or exercise?\"      No  8. MEDICATIONS: \"What have you taken so far for the pain?\" (e.g., nothing, acetaminophen, NSAIDS)      Ibuprofen  9. NEUROLOGIC SYMPTOMS: \"Do you have any weakness, numbness, or problems with bowel/bladder control?\"      Numbness to bilateral lower extremities  10. OTHER SYMPTOMS: \"Do you have any other symptoms?\" (e.g., fever, abdominal pain, burning with urination, blood in urine)        No  11. PREGNANCY: \"Is there any chance you are pregnant?\" (e.g., yes, no; LMP)        NA    Protocols used: BACK PAIN-A-OH      "

## 2022-08-01 NOTE — ED TRIAGE NOTES
Pt arrives via EMS with c/o back pain that has been ongoing for months and got worse three days ago. Pt denies taking any pain relieving medications today, including tylenol and ibuprofen. Pt was able to bear weight independently into wheelchair. Pt has been seen by primary care about this issue and was told he has arthritis and nothing can be done.     Triage Assessment     Row Name 08/01/22 1617       Triage Assessment (Adult)    Airway WDL WDL       Respiratory WDL    Respiratory WDL WDL       Skin Circulation/Temperature WDL    Skin Circulation/Temperature WDL WDL       Cardiac WDL    Cardiac WDL WDL       Peripheral/Neurovascular WDL    Peripheral Neurovascular WDL WDL       Cognitive/Neuro/Behavioral WDL    Cognitive/Neuro/Behavioral WDL WDL

## 2022-08-02 NOTE — ED PROVIDER NOTES
"  History     Chief Complaint   Patient presents with     Back Pain     HPI  Tenzin Ca is a 47 year old adult who is brought in for evaluation via EMS.  He has been complaining of back pain has been ongoing for the past few months.  He reports is gotten worse over the past 3 days.  He denies taking any pain relieving medications.  No Tylenol or ibuprofen.  He was noted to be able to weight-bear independently into the wheelchair.  He has seen his primary care provider for this and was informed that this is due to his arthritis and there is not much anyone can do.  He denies any loss of bowel or bladder control.  He states \"my bladder is numb\".    Allergies:  No Known Allergies    Problem List:    Patient Active Problem List    Diagnosis Date Noted     Blood per rectum 04/20/2022     Priority: Medium     Chronic bilateral low back pain without sciatica 02/08/2022     Priority: Medium     Numbness 11/22/2021     Priority: Medium     S/P cervical spinal fusion 10/14/2021     Priority: Medium     Fusion of spine of cervical region 09/20/2021     Priority: Medium     Myelopathy concurrent with and due to spinal stenosis of cervical region (H) 09/06/2021     Priority: Medium     Paranoid schizophrenia (H) 09/04/2021     Priority: Medium     Sebaceous cyst 05/26/2021     Priority: Medium     Skin nodule 05/26/2021     Priority: Medium     Morbid obesity (H) 05/12/2021     Priority: Medium     Non-intractable vomiting with nausea 05/12/2021     Priority: Medium     Vitamin D deficiency 12/26/2018     Priority: Medium     Slow transit constipation 02/22/2018     Priority: Medium     Hypertriglyceridemia 02/20/2018     Priority: Medium     Major depressive disorder, recurrent episode (H) 02/20/2018     Priority: Medium     Psoriasis 02/20/2018     Priority: Medium     Schizophrenia (H) 02/20/2018     Priority: Medium     Overview:   hears voices       Torn medial meniscus 02/20/2018     Priority: Medium     Obesity " 02/21/2017     Priority: Medium     S/P left knee arthroscopy 07/05/2016     Priority: Medium     Psychosis (H) 07/02/2015     Priority: Medium     Erectile dysfunction 04/23/2014     Priority: Medium     Delayed ejaculation 06/03/2013     Priority: Medium        Past Medical History:    Past Medical History:   Diagnosis Date     Mental disorder      Personal history of other mental and behavioral disorders        Past Surgical History:    Past Surgical History:   Procedure Laterality Date     ARTHROSCOPY KNEE      Left Knee Arthroscopy and Partial Meniscectomy and Chondroplasty     COLONOSCOPY N/A 6/16/2022    Procedure: COLONOSCOPY;  Surgeon: Frank Toro MD;  Location:  OR       Family History:    Family History   Problem Relation Age of Onset     Substance Abuse Mother         Alcohol/Drug     Alcoholism Mother         Alcoholism     Substance Abuse Brother         Alcohol/Drug     Alcoholism Brother         Alcoholism     Substance Abuse Sister         Alcohol/Drug     Alcoholism Sister         Alcoholism       Social History:  Marital Status:  Single [1]  Social History     Tobacco Use     Smoking status: Passive Smoke Exposure - Never Smoker     Smokeless tobacco: Never Used   Vaping Use     Vaping Use: Never used   Substance Use Topics     Alcohol use: No     Alcohol/week: 0.0 standard drinks     Drug use: No        Medications:    lidocaine (LIDODERM) 5 % patch  predniSONE (DELTASONE) 20 MG tablet  ARIPiprazole (ABILIFY) 20 MG tablet  clonazePAM (KLONOPIN) 1 MG tablet  OXcarbazepine (TRILEPTAL) 300 MG tablet  OXcarbazepine (TRILEPTAL) 600 MG tablet  polyethylene glycol (MIRALAX) 17 GM/Dose powder  prazosin (MINIPRESS) 1 MG capsule  senna-docusate (STOOL SOFTENER/LAXATIVE) 8.6-50 MG tablet  sildenafil (REVATIO) 20 MG tablet  zolpidem (AMBIEN) 10 MG tablet          Review of Systems   Constitutional: Negative for fever.   HENT: Negative for drooling and facial swelling.    Eyes: Negative for pain and  visual disturbance.   Respiratory: Negative for stridor.    Cardiovascular: Negative for chest pain.   Gastrointestinal: Negative for abdominal pain.   Genitourinary: Negative for flank pain.   Musculoskeletal: Positive for back pain.   Skin: Negative for pallor.   Neurological: Positive for numbness. Negative for tremors, seizures and facial asymmetry.   Psychiatric/Behavioral: Negative for agitation and confusion.   All other systems reviewed and are negative.      Physical Exam   BP: (!) 141/93  Pulse: 72  Temp: 98.3  F (36.8  C)  Resp: 16  Weight: 126.6 kg (279 lb)  SpO2: 97 %      Physical Exam  Vitals and nursing note reviewed.   Constitutional:       General: He is not in acute distress.     Appearance: Normal appearance. He is not ill-appearing or toxic-appearing.   HENT:      Head: Normocephalic. No raccoon eyes, right periorbital erythema or left periorbital erythema.      Right Ear: No drainage or tenderness.      Left Ear: No drainage or tenderness.      Nose: Nose normal.   Eyes:      General: Lids are normal. Gaze aligned appropriately. No scleral icterus.     Extraocular Movements: Extraocular movements intact.   Neck:      Trachea: No tracheal deviation.   Cardiovascular:      Rate and Rhythm: Normal rate.   Pulmonary:      Effort: Pulmonary effort is normal. No respiratory distress.      Breath sounds: No stridor.      Comments: SaO2 is 97% on room air.  He does not appear to be in any respiratory distress.  No tachypnea.  Abdominal:      Tenderness: There is no abdominal tenderness.   Musculoskeletal:         General: No deformity or signs of injury. Normal range of motion.      Cervical back: Normal range of motion. No signs of trauma.   Skin:     General: Skin is warm and dry.      Coloration: Skin is not jaundiced or pale.   Neurological:      General: No focal deficit present.      Mental Status: He is alert and oriented to person, place, and time.      GCS: GCS eye subscore is 4. GCS verbal  subscore is 5. GCS motor subscore is 6.      Motor: No tremor or seizure activity.   Psychiatric:         Attention and Perception: Attention normal.         Mood and Affect: Mood normal.         ED Course       Results for orders placed or performed during the hospital encounter of 08/01/22 (from the past 24 hour(s))   CT Lumbar Spine w/o Contrast    Narrative    CT LUMBAR SPINE W/O CONTRAST, 8/1/2022 6:45 PM    History: Unknown, age 47 years; Low back pain; r/o Spondylolysis; Low  back pain with standing/walking/extension; No known/automatically  detected potential contraindications to CT    Comparison: CT scan lumbar spine 9/4/2021    TECHNIQUE: CT was performed of the lumbar spine. Sagittal, coronal,  and axial reconstructions were reviewed.    FINDINGS: The  lumbar spine demonstrates mild generalized  straightening, similar in appearance when compared to prior study.  Bilateral pars defects again seen at L5. There is no evidence of  significant subluxation of L5 relative to S1. There is no evidence of  an acute or healing fracture. Soft tissues again demonstrate chronic  bulge and endplate changes at L5-S1 which narrow the neural foramina.  Chronic bulging and endplate changes as well as facet joint  hypertrophy again narrowing the neural foramina at L4-5 and to a  lesser extent at L3-4 resulting in bilateral ganglionic  impingement/abutment at each of these levels.    .      Impression    IMPRESSION:   No evidence of acute or subacute bony abnormality.     Bilateral pars defects at L5-S1 are similar in appearance without  significant subluxation.    Disc, endplate and facet joint degenerative changes again narrow the  neural foramina at L5-S1 and to a lesser extent at L4-5 and L3-4  without significant change.    ZOILA MARTINEZ MD         SYSTEM ID:  [id number]       Medications   lidocaine (LIDODERM) 5 % Patch 2 patch (2 patches Transdermal Patch/Med Applied 8/1/22 1590)   lidocaine patch in PLACE (has no  administration in time range)   methylPREDNISolone sodium succinate (solu-MEDROL) injection 60 mg (60 mg Intramuscular Given 8/1/22 1846)   ketorolac (TORADOL) injection 60 mg (60 mg Intramuscular Given 8/1/22 1845)       Assessments & Plan (with Medical Decision Making)     I have reviewed the nursing notes.    I have reviewed the findings, diagnosis, plan and need for follow up with the patient.      New Prescriptions    LIDOCAINE (LIDODERM) 5 % PATCH    Place 2 patches onto the skin every 24 hours for 10 days    PREDNISONE (DELTASONE) 20 MG TABLET    Take two tablets (= 40mg) each day for 5 (five) days, then one tablet (20 mg) each day x 5 days until finished       Final diagnoses:   Lumbar back pain   Numbness     Afebrile.  Vital signs stable.  Patient with acute on chronic lumbar back pain now complaining of increased numbness.  He was given Lidoderm patches as well as  Solu-Medrol and Toradol in the ER.  It has been 10 months since his previous CT of his lumbar area.  CT of his lumbar spine today shows No evidence of acute or subacute bony abnormality.   Bilateral pars defects at L5-S1 are similar in appearance without significant subluxation.   Disc, endplate and facet joint degenerative changes again narrow the neural foramina at L5-S1 and to a lesser extent at L4-5 and L3-4 without significant change.  Patient is feeling some improvement with the above treatment.  I discussed ice and heat therapies. Rx for lidoderm patches as well as tapered prednisone.  He can use over-the-counter Tylenol and Motrin as well if needed.  I discussed continued monitoring and close follow-up with his primary care provider for further evaluation should his symptoms persist in 1 week.  Follow-up sooner if there is any other concerns problems or questions.        8/1/2022   [facility].     Robin Mueller PA-C  08/01/22 2000

## 2022-08-03 ENCOUNTER — TELEPHONE (OUTPATIENT)
Dept: FAMILY MEDICINE | Facility: OTHER | Age: 47
End: 2022-08-03

## 2022-08-03 NOTE — TELEPHONE ENCOUNTER
I am submitting a request to pharmacy for patient's Lidocaine 5% patches, was ordered by Dr. Merino without a DX code listed. I need this before we can submit authorization. thanks

## 2022-08-03 NOTE — TELEPHONE ENCOUNTER
Numbness      Problem Detail    Noted:  11/22/2021   Priority:  Medium   Last Encounter with Numbness      Visit Information    Encounter Information    Department Center   8/1/2022  EMERGENCY DEPT [facility] [place]     Diagnoses     Codes Comments   Lumbar back pain  M54.50    Numbness  R20.0      Notes    No notes of this type exist for this encounter.

## 2022-08-04 ENCOUNTER — NURSE TRIAGE (OUTPATIENT)
Dept: NURSING | Facility: CLINIC | Age: 47
End: 2022-08-04

## 2022-08-04 NOTE — TELEPHONE ENCOUNTER
"Pt reports he is taking a steroid and \"bad side effects kicking in, anxiety, vomiting sometimes not aware of speech, will start talking and people notice I am very rude and disrespectful and usually not that kind of person, lightheadness and sometimes headaches\". Pt reports he was prescribed prednisone in ER three days ago for back pain. Pt reports his worst symptom is \"my anxiety and vomiting\". Pt states he has vomited 2-3 times in past 24 hours. Pt denies thoughts of harming self or others and states his in a safe place. Pt reports he is able to swallow fluids. Pt is asking if he can stop taking the prednisone.     Advised pt to be reevaluated in ER or UC now, have another adult drive.    Pt verbalizes understanding and agrees to plan.     Reason for Disposition    [1] Caller has URGENT medicine question about med that PCP or specialist prescribed AND [2] triager unable to answer question    Protocols used: MEDICATION QUESTION CALL-A-AH      "

## 2022-08-05 ENCOUNTER — NURSE TRIAGE (OUTPATIENT)
Dept: FAMILY MEDICINE | Facility: OTHER | Age: 47
End: 2022-08-05

## 2022-08-05 ENCOUNTER — OFFICE VISIT (OUTPATIENT)
Dept: FAMILY MEDICINE | Facility: OTHER | Age: 47
End: 2022-08-05
Attending: NURSE PRACTITIONER
Payer: COMMERCIAL

## 2022-08-05 VITALS
TEMPERATURE: 98 F | SYSTOLIC BLOOD PRESSURE: 136 MMHG | HEART RATE: 70 BPM | OXYGEN SATURATION: 99 % | BODY MASS INDEX: 38.36 KG/M2 | RESPIRATION RATE: 20 BRPM | DIASTOLIC BLOOD PRESSURE: 84 MMHG | WEIGHT: 256 LBS

## 2022-08-05 DIAGNOSIS — T88.7XXA MEDICATION SIDE EFFECTS: Primary | ICD-10-CM

## 2022-08-05 PROCEDURE — 99212 OFFICE O/P EST SF 10 MIN: CPT | Performed by: PHYSICIAN ASSISTANT

## 2022-08-05 PROCEDURE — G0463 HOSPITAL OUTPT CLINIC VISIT: HCPCS

## 2022-08-05 ASSESSMENT — PAIN SCALES - GENERAL: PAINLEVEL: NO PAIN (0)

## 2022-08-05 NOTE — TELEPHONE ENCOUNTER
"Patient states he will go to [facility] after scheduling a ride. Helen Juárez RN on 8/5/2022 at 8:58 AM      Reason for Disposition    Chest pain or pressure  (Exception: MILD central chest pain, present only when coughing)    Additional Information    Negative: SEVERE difficulty breathing (e.g., struggling for each breath, speaks in single words)    Negative: Difficult to awaken or acting confused (e.g., disoriented, slurred speech)    Negative: Bluish (or gray) lips or face now    Negative: Shock suspected (e.g., cold/pale/clammy skin, too weak to stand, low BP, rapid pulse)    Negative: Sounds like a life-threatening emergency to the triager    Negative: [1] Diagnosed or suspected COVID-19 AND [2] symptoms lasting 3 or more weeks    Negative: [1] COVID-19 exposure AND [2] no symptoms    Negative: COVID-19 vaccine reaction suspected (e.g., fever, headache, muscle aches) occurring 1 to 3 days after getting vaccine    Negative: COVID-19 vaccine, questions about    Negative: [1] Lives with someone known to have influenza (flu test positive) AND [2] flu-like symptoms (e.g., cough, runny nose, sore throat, SOB; with or without fever)    Negative: [1] Adult with possible COVID-19 symptoms AND [2] triager concerned about severity of symptoms or other causes    Negative: COVID-19 and breastfeeding, questions about    Negative: SEVERE or constant chest pain or pressure  (Exception: Mild central chest pain, present only when coughing.)    Negative: MODERATE difficulty breathing (e.g., speaks in phrases, SOB even at rest, pulse 100-120)    Negative: Headache and stiff neck (can't touch chin to chest)    Negative: Oxygen level (e.g., pulse oximetry) 90 percent or lower    Answer Assessment - Initial Assessment Questions  1. COVID-19 DIAGNOSIS: \"Who made your COVID-19 diagnosis?\" \"Was it confirmed by a positive lab test or self-test?\" If not diagnosed by a doctor (or NP/PA), ask \"Are there lots of cases (community spread) " "where you live?\" Note: See public health department website, if unsure.      suspected  2. COVID-19 EXPOSURE: \"Was there any known exposure to COVID before the symptoms began?\" CDC Definition of close contact: within 6 feet (2 meters) for a total of 15 minutes or more over a 24-hour period.       unsure  3. ONSET: \"When did the COVID-19 symptoms start?\"       1:00 AM this morning  4. WORST SYMPTOM: \"What is your worst symptom?\" (e.g., cough, fever, shortness of breath, muscle aches)      Vomiting, shortness of breath, difficult breathing, difficulty swallowing, lightheaded/dizzy, diarrhea, odd taste/smell, cough  5. COUGH: \"Do you have a cough?\" If Yes, ask: \"How bad is the cough?\"        yes  6. FEVER: \"Do you have a fever?\" If Yes, ask: \"What is your temperature, how was it measured, and when did it start?\"      Has the chills  7. RESPIRATORY STATUS: \"Describe your breathing?\" (e.g., shortness of breath, wheezing, unable to speak)       When laid down to sleep had a hard time breathing  8. BETTER-SAME-WORSE: \"Are you getting better, staying the same or getting worse compared to yesterday?\"  If getting worse, ask, \"In what way?\"      n/a  9. HIGH RISK DISEASE: \"Do you have any chronic medical problems?\" (e.g., asthma, heart or lung disease, weak immune system, obesity, etc.)      denies  10. VACCINE: \"Have you had the COVID-19 vaccine?\" If Yes, ask: \"Which one, how many shots, when did you get it?\"        yes  11. BOOSTER: \"Have you received your COVID-19 booster?\" If Yes, ask: \"Which one and when did you get it?\"        yes  12. PREGNANCY: \"Is there any chance you are pregnant?\" \"When was your last menstrual period?\"        no  13. OTHER SYMPTOMS: \"Do you have any other symptoms?\"  (e.g., chills, fatigue, headache, loss of smell or taste, muscle pain, sore throat)        As above  14. O2 SATURATION MONITOR:  \"Do you use an oxygen saturation monitor (pulse oximeter) at home?\" If Yes, ask \"What is your reading " "(oxygen level) today?\" \"What is your usual oxygen saturation reading?\" (e.g., 95%)        n/a    Protocols used: CORONAVIRUS (COVID-19) DIAGNOSED OR [id number]    "

## 2022-08-05 NOTE — TELEPHONE ENCOUNTER
Pt is experiencing symptoms of covid and has extreme anxiety and maybe having reaction to medication.  He would like to discuss before making appt.  Please call.    Michael Conner on 8/5/2022 at 8:05 AM

## 2022-08-05 NOTE — NURSING NOTE
Chief Complaint   Patient presents with     Medication Problem     Possible Prednisone reaction       Medication Reconciliation: complete   States whenever he takes the prednisone he gets lightheaded/ dizziness, shaky, diarrhea, chest pains, SOB, and Anxiety.     Dory Rivas LPN........................8/5/2022  10:35 AM

## 2022-08-05 NOTE — PROGRESS NOTES
Assessment & Plan     1. Medication side effects  Likely noticing increased anxiety, vomiting, dizziness, and loose stools as side effects of prednisone as symptoms started after starting steroid. Has not taken a dose today. Would like to stop medication. Discussed ok to stop medication today as was prescribed a small dose and has already missed a dose. Symptoms will likely improve over the next couple days. If symptoms worsening, follow up for repeat evaluation.       Return if symptoms worsen or fail to improve.    Micaela Okeefe PA-C  [facility]    Subjective   Tenzin is a 47 year old, presenting for the following health issues:  Medication Problem (Possible Prednisone reaction)      HPI   Here for evaluation of concern for medication side effects.  Patient was seen in the ED on 8/1/2022 for evaluation of low back pain.  He was started on prescription of prednisone.  Patient reports he has taken this for 3 days and has noticed significant increase in anxiety, vomiting, dizziness, looser stools.  Patient reports symptom onset after taking the prednisone.  He has been taking his as needed anxiety medications which provide minimal relief.  Patient is here for guidance on discontinuing prednisone.    PAST MEDICAL HISTORY:   Past Medical History:   Diagnosis Date     Mental disorder     Disability related to mental illness     Personal history of other mental and behavioral disorders     2016       PAST SURGICAL HISTORY:   Past Surgical History:   Procedure Laterality Date     ARTHROSCOPY KNEE      Left Knee Arthroscopy and Partial Meniscectomy and Chondroplasty     COLONOSCOPY N/A 6/16/2022    Procedure: COLONOSCOPY;  Surgeon: Frank Toro MD;  Location:  OR       FAMILY HISTORY:   Family History   Problem Relation Age of Onset     Substance Abuse Mother         Alcohol/Drug     Alcoholism Mother         Alcoholism     Substance Abuse Brother         Alcohol/Drug     Alcoholism Brother          Alcoholism     Substance Abuse Sister         Alcohol/Drug     Alcoholism Sister         Alcoholism       SOCIAL HISTORY:   Social History     Tobacco Use     Smoking status: Passive Smoke Exposure - Never Smoker     Smokeless tobacco: Never Used   Substance Use Topics     Alcohol use: No     Alcohol/week: 0.0 standard drinks      No Known Allergies  Current Outpatient Medications   Medication     ARIPiprazole (ABILIFY) 20 MG tablet     clonazePAM (KLONOPIN) 1 MG tablet     lidocaine (LIDODERM) 5 % patch     OXcarbazepine (TRILEPTAL) 300 MG tablet     OXcarbazepine (TRILEPTAL) 600 MG tablet     polyethylene glycol (MIRALAX) 17 GM/Dose powder     prazosin (MINIPRESS) 1 MG capsule     predniSONE (DELTASONE) 20 MG tablet     senna-docusate (STOOL SOFTENER/LAXATIVE) 8.6-50 MG tablet     sildenafil (REVATIO) 20 MG tablet     zolpidem (AMBIEN) 10 MG tablet     No current facility-administered medications for this visit.         Review of Systems   Per HPI        Objective    /84 (BP Location: Right arm, Patient Position: Sitting, Cuff Size: Adult Large)   Pulse 70   Temp 98  F (36.7  C) (Tympanic)   Resp 20   Wt 116.1 kg (256 lb)   SpO2 99%   Breastfeeding No   BMI 38.36 kg/m    Body mass index is 38.36 kg/m .  Physical Exam   General: Pleasant, in no apparent distress. Increased anxiety throughout visit  Psych: Appropriate mood and affect.

## 2022-08-30 ENCOUNTER — LAB (OUTPATIENT)
Dept: LAB | Facility: OTHER | Age: 47
End: 2022-08-30
Payer: COMMERCIAL

## 2022-08-30 DIAGNOSIS — F20.0 PARANOID SCHIZOPHRENIA, SUBCHRONIC CONDITION (H): ICD-10-CM

## 2022-08-30 DIAGNOSIS — F70 MILD INTELLECTUAL DISABILITIES: ICD-10-CM

## 2022-08-30 DIAGNOSIS — Z79.899 NEED FOR PROPHYLACTIC CHEMOTHERAPY: ICD-10-CM

## 2022-08-30 DIAGNOSIS — F41.0 PANIC DISORDER: ICD-10-CM

## 2022-08-30 LAB
ALBUMIN SERPL-MCNC: 4.2 G/DL (ref 3.5–5.7)
ALP SERPL-CCNC: 75 U/L (ref 34–104)
ALT SERPL W P-5'-P-CCNC: 16 U/L (ref 7–52)
ANION GAP SERPL CALCULATED.3IONS-SCNC: 2 MMOL/L (ref 3–14)
AST SERPL W P-5'-P-CCNC: 15 U/L (ref 13–39)
BASOPHILS # BLD AUTO: 0 10E3/UL (ref 0–0.2)
BASOPHILS NFR BLD AUTO: 1 %
BILIRUB SERPL-MCNC: 0.5 MG/DL (ref 0.3–1)
BUN SERPL-MCNC: 7 MG/DL (ref 7–25)
CALCIUM SERPL-MCNC: 9.4 MG/DL (ref 8.6–10.3)
CHLORIDE BLD-SCNC: 106 MMOL/L (ref 98–107)
CHOLEST SERPL-MCNC: 187 MG/DL
CO2 SERPL-SCNC: 28 MMOL/L (ref 21–31)
CREAT SERPL-MCNC: 1.04 MG/DL (ref 0.6–1.3)
DEPRECATED CALCIDIOL+CALCIFEROL SERPL-MC: 25 UG/L (ref 30–100)
EOSINOPHIL # BLD AUTO: 0.1 10E3/UL (ref 0–0.7)
EOSINOPHIL NFR BLD AUTO: 2 %
ERYTHROCYTE [DISTWIDTH] IN BLOOD BY AUTOMATED COUNT: 12.1 % (ref 10–15)
FASTING STATUS PATIENT QL REPORTED: YES
GFR SERPL CREATININE-BSD FRML MDRD: ABNORMAL ML/MIN/{1.73_M2}
GLUCOSE BLD-MCNC: 92 MG/DL (ref 70–105)
HBA1C MFR BLD: 5.5 % (ref 4–6.2)
HCT VFR BLD AUTO: 39.1 % (ref 35–53)
HDLC SERPL-MCNC: 30 MG/DL (ref 23–92)
HGB BLD-MCNC: 13.6 G/DL (ref 11.7–17.7)
IMM GRANULOCYTES # BLD: 0 10E3/UL
IMM GRANULOCYTES NFR BLD: 1 %
LDLC SERPL CALC-MCNC: 130 MG/DL
LYMPHOCYTES # BLD AUTO: 1.1 10E3/UL (ref 0.8–5.3)
LYMPHOCYTES NFR BLD AUTO: 27 %
MAGNESIUM SERPL-MCNC: 2.1 MG/DL (ref 1.9–2.7)
MCH RBC QN AUTO: 30.3 PG (ref 26.5–33)
MCHC RBC AUTO-ENTMCNC: 34.8 G/DL (ref 31.5–36.5)
MCV RBC AUTO: 87 FL (ref 78–100)
MONOCYTES # BLD AUTO: 0.3 10E3/UL (ref 0–1.3)
MONOCYTES NFR BLD AUTO: 8 %
NEUTROPHILS # BLD AUTO: 2.6 10E3/UL (ref 1.6–8.3)
NEUTROPHILS NFR BLD AUTO: 61 %
NONHDLC SERPL-MCNC: 157 MG/DL
NRBC # BLD AUTO: 0 10E3/UL
NRBC BLD AUTO-RTO: 0 /100
PLATELET # BLD AUTO: 169 10E3/UL (ref 150–450)
POTASSIUM BLD-SCNC: 4.1 MMOL/L (ref 3.5–5.1)
PROLACTIN SERPL-MCNC: 2 UG/L (ref 2–27)
PROT SERPL-MCNC: 6.7 G/DL (ref 6.4–8.9)
PTH-INTACT SERPL-MCNC: 35 PG/ML (ref 15–65)
RBC # BLD AUTO: 4.49 10E6/UL (ref 3.8–5.9)
SODIUM SERPL-SCNC: 136 MMOL/L (ref 134–144)
T3FREE SERPL-MCNC: 2.5 PG/ML (ref 2–4.4)
T4 FREE SERPL-MCNC: 0.57 NG/DL (ref 0.6–1.6)
TRIGL SERPL-MCNC: 134 MG/DL
TSH SERPL DL<=0.005 MIU/L-ACNC: 2.96 MU/L (ref 0.4–4)
VIT B12 SERPL-MCNC: 273 PG/ML (ref 180–914)
WBC # BLD AUTO: 4.2 10E3/UL (ref 4–11)

## 2022-08-30 PROCEDURE — 84439 ASSAY OF FREE THYROXINE: CPT | Mod: ZL

## 2022-08-30 PROCEDURE — 82306 VITAMIN D 25 HYDROXY: CPT | Mod: ZL

## 2022-08-30 PROCEDURE — 83036 HEMOGLOBIN GLYCOSYLATED A1C: CPT | Mod: ZL

## 2022-08-30 PROCEDURE — 83735 ASSAY OF MAGNESIUM: CPT | Mod: ZL

## 2022-08-30 PROCEDURE — 80061 LIPID PANEL: CPT | Mod: ZL

## 2022-08-30 PROCEDURE — 84146 ASSAY OF PROLACTIN: CPT | Mod: ZL

## 2022-08-30 PROCEDURE — 85004 AUTOMATED DIFF WBC COUNT: CPT | Mod: ZL

## 2022-08-30 PROCEDURE — 36415 COLL VENOUS BLD VENIPUNCTURE: CPT | Mod: ZL

## 2022-08-30 PROCEDURE — 84443 ASSAY THYROID STIM HORMONE: CPT | Mod: ZL

## 2022-08-30 PROCEDURE — 80183 DRUG SCRN QUANT OXCARBAZEPIN: CPT | Mod: ZL

## 2022-08-30 PROCEDURE — 82607 VITAMIN B-12: CPT | Mod: ZL

## 2022-08-30 PROCEDURE — 84481 FREE ASSAY (FT-3): CPT | Mod: ZL

## 2022-08-30 PROCEDURE — 80051 ELECTROLYTE PANEL: CPT | Mod: ZL

## 2022-08-30 PROCEDURE — 83970 ASSAY OF PARATHORMONE: CPT | Mod: ZL

## 2022-09-02 LAB — 10OH-CARBAZEPINE SERPL-MCNC: 19 UG/ML

## 2022-09-11 ENCOUNTER — HEALTH MAINTENANCE LETTER (OUTPATIENT)
Age: 47
End: 2022-09-11

## 2022-10-05 ENCOUNTER — IMMUNIZATION (OUTPATIENT)
Dept: FAMILY MEDICINE | Facility: OTHER | Age: 47
End: 2022-10-05
Attending: FAMILY MEDICINE
Payer: COMMERCIAL

## 2022-10-05 DIAGNOSIS — Z23 HIGH PRIORITY FOR 2019-NCOV VACCINE: ICD-10-CM

## 2022-10-05 DIAGNOSIS — Z23 NEED FOR PROPHYLACTIC VACCINATION AND INOCULATION AGAINST INFLUENZA: Primary | ICD-10-CM

## 2022-10-05 PROCEDURE — 91312 COVID-19,PF,PFIZER BOOSTER BIVALENT: CPT

## 2022-10-05 PROCEDURE — 90686 IIV4 VACC NO PRSV 0.5 ML IM: CPT

## 2022-10-14 NOTE — PROGRESS NOTES
Assessment & Plan     1. Urinary frequency  Differential includes UTI, STD, BPH, prostate cancer, diabetes, bladder irritants consumed, dehydration, overhydration, renal dysfunction, etc.  Vitals and exam stable, UA without signs of infection.  GC/chlamydia, blood work pending as below.  Describes symptoms seem most consistent with probable development of BPH.  Will notify results and treat if appropriate.  - UA reflex to Microscopic and Culture  - GC/Chlamydia by PCR  - Basic Metabolic Panel; Future  - Prostate Specific Antigen; Future  - Prostate Specific Antigen  - Basic Metabolic Panel    2. Screen for STD (sexually transmitted disease)  GC/chlamydia results pending.  Will notify with results and treat if indicated.  Encourage safe sex practices.    - GC/Chlamydia by PCR      Addendum:  Lab work unremarkable.  Possible BPH as cause.  Discussed conservative management however patient interested in medication at this time.  Low-dose Flomax started 0.4 mg daily as patient is not sure if he is still taking prazosin.  Recommend to limit use of Flomax and the sildenafil in combination due to risk of hypotension. Patient was made aware of possible interaction with prazosin as well and how that can cause significant hypotension or bradycardia. Patient is insistent upon starting a medication option at this time. If minimal improvement or causing side effects can follow up with urology to discuss best treatment options going forward.   Micaela Okeefe PA-C on 10/17/2022 at 1:17 PM      Return if symptoms worsen or fail to improve.    Micaela Okeefe PA-C  [facility] hospitals   Tenzin is a 47 year old, presenting for the following health issues:  Urinary Problem      History of Present Illness       Reason for visit:  Peeing probems    He eats 0-1 servings of fruits and vegetables daily.He consumes 6 sweetened beverage(s) daily.He exercises with enough effort to increase his heart rate 9 or  less minutes per day.  He exercises with enough effort to increase his heart rate 4 days per week.   He is taking medications regularly.     Here for evaluation of urinary concerns.  Patient reports for the past several weeks he has been struggling with increased urinary frequency.  Reports that he has concerns he has an infection as he is urinating frequently but is not consuming much fluids during the day.  Reports he consumes minimal water, does consume moderate amount of pop.  Minimal coffee or alcohol intake.  Upon further questioning patient also reports a longstanding history of urinary hesitancy, stopping going during urinary stream, sometimes split urinary stream.  Reviewed lab work from August completed by mental health manager showing stable kidney function and electrolytes, stable A1c.  No associated fever/chills, urinary urgency, dysuria, hematuria, flank pain, abdominal pain, nausea, vomiting, diarrhea, constipation, blood in stool.  Minimal STD concerns but does report that he had been sexually active with 1 female couple months ago.      PAST MEDICAL HISTORY:   Past Medical History:   Diagnosis Date     Mental disorder     Disability related to mental illness     Personal history of other mental and behavioral disorders     2016       PAST SURGICAL HISTORY:   Past Surgical History:   Procedure Laterality Date     ARTHROSCOPY KNEE      Left Knee Arthroscopy and Partial Meniscectomy and Chondroplasty     COLONOSCOPY N/A 6/16/2022    Procedure: COLONOSCOPY;  Surgeon: Frank Toro MD;  Location:  OR       FAMILY HISTORY:   Family History   Problem Relation Age of Onset     Substance Abuse Mother         Alcohol/Drug     Alcoholism Mother         Alcoholism     Substance Abuse Brother         Alcohol/Drug     Alcoholism Brother         Alcoholism     Substance Abuse Sister         Alcohol/Drug     Alcoholism Sister         Alcoholism       SOCIAL HISTORY:   Social History     Tobacco Use     Smoking  "status: Never     Passive exposure: Yes     Smokeless tobacco: Never   Substance Use Topics     Alcohol use: No     Alcohol/week: 0.0 standard drinks      No Known Allergies  Current Outpatient Medications   Medication     ARIPiprazole (ABILIFY) 30 MG tablet     clonazePAM (KLONOPIN) 1 MG tablet     OXcarbazepine (TRILEPTAL) 300 MG tablet     OXcarbazepine (TRILEPTAL) 600 MG tablet     polyethylene glycol (MIRALAX) 17 GM/Dose powder     prazosin (MINIPRESS) 1 MG capsule     predniSONE (DELTASONE) 20 MG tablet     senna-docusate (STOOL SOFTENER/LAXATIVE) 8.6-50 MG tablet     sildenafil (REVATIO) 20 MG tablet     zolpidem (AMBIEN) 10 MG tablet     No current facility-administered medications for this visit.         Review of Systems   Per HPI        Objective    /72   Pulse 67   Temp 96.8  F (36  C)   Resp 14   Ht 1.74 m (5' 8.5\")   Wt 119.7 kg (263 lb 12.8 oz)   SpO2 98%   BMI 39.53 kg/m    Body mass index is 39.53 kg/m .  Physical Exam   General: Pleasant, in no apparent distress.  Cardiovascular: Regular rate and rhythm with S1 equal to S2. No murmurs, friction rubs, or gallops.   Respiratory: Lungs are resonant and clear to auscultation bilaterally. No wheezes, crackles, or rhonchi.  : declined   Psych: Appropriate mood and affect.    Results for orders placed or performed in visit on 10/17/22   UA reflex to Microscopic and Culture     Status: Normal    Specimen: Urine, NOS   Result Value Ref Range    Color Urine Light Yellow Colorless, Straw, Light Yellow, Yellow    Appearance Urine Clear Clear    Glucose Urine Negative Negative mg/dL    Bilirubin Urine Negative Negative    Ketones Urine Negative Negative mg/dL    Specific Gravity Urine 1.009 1.000 - 1.030    Blood Urine Negative Negative    pH Urine 5.5 5.0 - 9.0    Protein Albumin Urine Negative Negative mg/dL    Urobilinogen Urine Normal Normal, 2.0 mg/dL    Nitrite Urine Negative Negative    Leukocyte Esterase Urine Negative Negative    " Narrative    Microscopic not indicated

## 2022-10-17 ENCOUNTER — OFFICE VISIT (OUTPATIENT)
Dept: FAMILY MEDICINE | Facility: OTHER | Age: 47
End: 2022-10-17
Attending: FAMILY MEDICINE
Payer: COMMERCIAL

## 2022-10-17 VITALS
DIASTOLIC BLOOD PRESSURE: 72 MMHG | HEIGHT: 69 IN | WEIGHT: 263.8 LBS | RESPIRATION RATE: 14 BRPM | TEMPERATURE: 96.8 F | SYSTOLIC BLOOD PRESSURE: 128 MMHG | OXYGEN SATURATION: 98 % | HEART RATE: 67 BPM | BODY MASS INDEX: 39.07 KG/M2

## 2022-10-17 DIAGNOSIS — R35.0 URINARY FREQUENCY: Primary | ICD-10-CM

## 2022-10-17 DIAGNOSIS — Z11.3 SCREEN FOR STD (SEXUALLY TRANSMITTED DISEASE): ICD-10-CM

## 2022-10-17 LAB
ALBUMIN UR-MCNC: NEGATIVE MG/DL
ANION GAP SERPL CALCULATED.3IONS-SCNC: 7 MMOL/L (ref 3–14)
APPEARANCE UR: CLEAR
BILIRUB UR QL STRIP: NEGATIVE
BUN SERPL-MCNC: 5 MG/DL (ref 7–25)
C TRACH DNA SPEC QL PROBE+SIG AMP: NEGATIVE
CALCIUM SERPL-MCNC: 9.6 MG/DL (ref 8.6–10.3)
CHLORIDE BLD-SCNC: 103 MMOL/L (ref 98–107)
CO2 SERPL-SCNC: 27 MMOL/L (ref 21–31)
COLOR UR AUTO: NORMAL
CREAT SERPL-MCNC: 1.02 MG/DL (ref 0.6–1.3)
GFR SERPL CREATININE-BSD FRML MDRD: ABNORMAL ML/MIN/{1.73_M2}
GLUCOSE BLD-MCNC: 100 MG/DL (ref 70–105)
GLUCOSE UR STRIP-MCNC: NEGATIVE MG/DL
HGB UR QL STRIP: NEGATIVE
KETONES UR STRIP-MCNC: NEGATIVE MG/DL
LEUKOCYTE ESTERASE UR QL STRIP: NEGATIVE
N GONORRHOEA DNA SPEC QL NAA+PROBE: NEGATIVE
NITRATE UR QL: NEGATIVE
PH UR STRIP: 5.5 [PH] (ref 5–9)
POTASSIUM BLD-SCNC: 4 MMOL/L (ref 3.5–5.1)
PSA SERPL-MCNC: 0.3 UG/L (ref 0–4)
SODIUM SERPL-SCNC: 137 MMOL/L (ref 134–144)
SP GR UR STRIP: 1.01 (ref 1–1.03)
UROBILINOGEN UR STRIP-MCNC: NORMAL MG/DL

## 2022-10-17 PROCEDURE — 81003 URINALYSIS AUTO W/O SCOPE: CPT | Mod: ZL | Performed by: PHYSICIAN ASSISTANT

## 2022-10-17 PROCEDURE — 82310 ASSAY OF CALCIUM: CPT | Mod: ZL | Performed by: PHYSICIAN ASSISTANT

## 2022-10-17 PROCEDURE — G0463 HOSPITAL OUTPT CLINIC VISIT: HCPCS

## 2022-10-17 PROCEDURE — 84153 ASSAY OF PSA TOTAL: CPT | Mod: ZL | Performed by: PHYSICIAN ASSISTANT

## 2022-10-17 PROCEDURE — 99213 OFFICE O/P EST LOW 20 MIN: CPT | Performed by: PHYSICIAN ASSISTANT

## 2022-10-17 PROCEDURE — 36415 COLL VENOUS BLD VENIPUNCTURE: CPT | Mod: ZL | Performed by: PHYSICIAN ASSISTANT

## 2022-10-17 PROCEDURE — 87491 CHLMYD TRACH DNA AMP PROBE: CPT | Mod: ZL | Performed by: PHYSICIAN ASSISTANT

## 2022-10-17 RX ORDER — ARIPIPRAZOLE 30 MG/1
30 TABLET ORAL EVERY MORNING
COMMUNITY
Start: 2022-10-13 | End: 2023-09-29

## 2022-10-17 RX ORDER — TAMSULOSIN HYDROCHLORIDE 0.4 MG/1
0.4 CAPSULE ORAL DAILY
Qty: 30 CAPSULE | Refills: 1 | Status: SHIPPED | OUTPATIENT
Start: 2022-10-17 | End: 2022-11-28

## 2022-10-17 ASSESSMENT — PAIN SCALES - GENERAL: PAINLEVEL: NO PAIN (0)

## 2022-10-17 NOTE — NURSING NOTE
Patient presents to clinic with frequency in urination and would also like STD testing.  Julianna Jenkins LPN ....................  10/17/2022   10:40 AM

## 2022-10-24 ENCOUNTER — TELEPHONE (OUTPATIENT)
Dept: FAMILY MEDICINE | Facility: OTHER | Age: 47
End: 2022-10-24

## 2022-10-24 NOTE — TELEPHONE ENCOUNTER
Appointment made. He states that this week does not work for him. appt is 10/31/2022 @ 2:20  Julianna Jenkins LPN ....................  10/24/2022   9:35 AM

## 2022-10-24 NOTE — TELEPHONE ENCOUNTER
Patient states that he is needing to speak with [facility] about alternative medications for the Flomax. He states that this medication reduced his heart rate. Patient states that pharmacy did send a request over. Please call when available    Becka Barton on 10/24/2022 at 9:12 AM

## 2022-10-26 ENCOUNTER — TRANSFERRED RECORDS (OUTPATIENT)
Dept: HEALTH INFORMATION MANAGEMENT | Facility: OTHER | Age: 47
End: 2022-10-26

## 2022-11-01 ENCOUNTER — TRANSFERRED RECORDS (OUTPATIENT)
Dept: HEALTH INFORMATION MANAGEMENT | Facility: OTHER | Age: 47
End: 2022-11-01

## 2022-11-02 ENCOUNTER — OFFICE VISIT (OUTPATIENT)
Dept: FAMILY MEDICINE | Facility: OTHER | Age: 47
End: 2022-11-02
Attending: NURSE PRACTITIONER
Payer: COMMERCIAL

## 2022-11-02 ENCOUNTER — HOSPITAL ENCOUNTER (OUTPATIENT)
Dept: GENERAL RADIOLOGY | Facility: OTHER | Age: 47
Discharge: HOME | End: 2022-11-02
Attending: NURSE PRACTITIONER
Payer: COMMERCIAL

## 2022-11-02 VITALS
HEART RATE: 86 BPM | BODY MASS INDEX: 39.33 KG/M2 | SYSTOLIC BLOOD PRESSURE: 120 MMHG | DIASTOLIC BLOOD PRESSURE: 72 MMHG | HEIGHT: 68 IN | WEIGHT: 259.5 LBS | OXYGEN SATURATION: 95 % | RESPIRATION RATE: 18 BRPM | TEMPERATURE: 97.9 F

## 2022-11-02 DIAGNOSIS — R30.0 DYSURIA: ICD-10-CM

## 2022-11-02 DIAGNOSIS — R30.9 PAINFUL URINATION: ICD-10-CM

## 2022-11-02 DIAGNOSIS — M54.50 CHRONIC BILATERAL LOW BACK PAIN WITHOUT SCIATICA: ICD-10-CM

## 2022-11-02 DIAGNOSIS — N30.00 ACUTE CYSTITIS WITHOUT HEMATURIA: Primary | ICD-10-CM

## 2022-11-02 DIAGNOSIS — R82.90 ABNORMAL FINDING IN URINE: ICD-10-CM

## 2022-11-02 DIAGNOSIS — R20.0 NUMBNESS: ICD-10-CM

## 2022-11-02 DIAGNOSIS — G89.29 CHRONIC BILATERAL LOW BACK PAIN WITHOUT SCIATICA: ICD-10-CM

## 2022-11-02 LAB
ALBUMIN UR-MCNC: 20 MG/DL
APPEARANCE UR: CLEAR
BACTERIA #/AREA URNS HPF: ABNORMAL /HPF
BILIRUB UR QL STRIP: NEGATIVE
COLOR UR AUTO: YELLOW
GLUCOSE UR STRIP-MCNC: NEGATIVE MG/DL
HGB UR QL STRIP: NEGATIVE
KETONES UR STRIP-MCNC: NEGATIVE MG/DL
LEUKOCYTE ESTERASE UR QL STRIP: NEGATIVE
MUCOUS THREADS #/AREA URNS LPF: PRESENT /LPF
NITRATE UR QL: NEGATIVE
PH UR STRIP: 6 [PH] (ref 5–9)
RBC URINE: <1 /HPF
SP GR UR STRIP: 1.03 (ref 1–1.03)
UROBILINOGEN UR STRIP-MCNC: 2 MG/DL
WBC URINE: 1 /HPF

## 2022-11-02 PROCEDURE — G0463 HOSPITAL OUTPT CLINIC VISIT: HCPCS

## 2022-11-02 PROCEDURE — 99213 OFFICE O/P EST LOW 20 MIN: CPT | Performed by: NURSE PRACTITIONER

## 2022-11-02 PROCEDURE — 72100 X-RAY EXAM L-S SPINE 2/3 VWS: CPT

## 2022-11-02 PROCEDURE — 87086 URINE CULTURE/COLONY COUNT: CPT | Mod: ZL | Performed by: NURSE PRACTITIONER

## 2022-11-02 PROCEDURE — 81001 URINALYSIS AUTO W/SCOPE: CPT | Mod: ZL

## 2022-11-02 PROCEDURE — G0463 HOSPITAL OUTPT CLINIC VISIT: HCPCS | Mod: 25

## 2022-11-02 RX ORDER — SULFAMETHOXAZOLE/TRIMETHOPRIM 800-160 MG
1 TABLET ORAL 2 TIMES DAILY
Qty: 14 TABLET | Refills: 0 | Status: SHIPPED | OUTPATIENT
Start: 2022-11-02 | End: 2022-11-09

## 2022-11-02 RX ORDER — ARIPIPRAZOLE 5 MG/1
TABLET ORAL
COMMUNITY
Start: 2022-08-23 | End: 2023-09-29

## 2022-11-02 RX ORDER — LIDOCAINE 50 MG/G
PATCH TOPICAL
COMMUNITY
Start: 2022-08-26 | End: 2023-09-29

## 2022-11-02 RX ORDER — TADALAFIL 20 MG/1
TABLET ORAL
COMMUNITY
Start: 2022-10-10 | End: 2023-09-29

## 2022-11-02 RX ORDER — ALPRAZOLAM 0.5 MG
TABLET ORAL
COMMUNITY
Start: 2022-10-13

## 2022-11-02 ASSESSMENT — PAIN SCALES - GENERAL: PAINLEVEL: SEVERE PAIN (7)

## 2022-11-02 ASSESSMENT — ENCOUNTER SYMPTOMS
DIFFICULTY URINATING: 0
FLANK PAIN: 0
FREQUENCY: 1
BACK PAIN: 1
DIZZINESS: 0
SHORTNESS OF BREATH: 0
WOUND: 0
HEMATURIA: 0
FACIAL ASYMMETRY: 0
NUMBNESS: 1
CHEST TIGHTNESS: 0
DYSURIA: 1
CHILLS: 0
FEVER: 0
NECK STIFFNESS: 0
CONFUSION: 0
HEADACHES: 0
LIGHT-HEADEDNESS: 0
NECK PAIN: 0
MYALGIAS: 0
BRUISES/BLEEDS EASILY: 0
ABDOMINAL PAIN: 0

## 2022-11-02 NOTE — NURSING NOTE
"Chief Complaint   Patient presents with     Knee Pain     Right knee pain ongoing for a couple weeks     Back Pain     Lower back pain since September 2021      Fall     Couple days ago, falls about 2-3 times daily     Dysuria     Ongoing for 2 days      Patient is here today for right knee pain ongoing for a couple weeks. Also lower back pain that he has had since September 2021. He also notes he started falling a couple days ago and he falls 2-3 times daily. He is having painful urination ongoing for 2 days.     Initial /72 (BP Location: Right arm, Patient Position: Sitting, Cuff Size: Adult Large)   Pulse 86   Temp 97.9  F (36.6  C) (Tympanic)   Resp 18   Ht 1.727 m (5' 8\")   Wt 117.7 kg (259 lb 8 oz)   SpO2 95%   BMI 39.46 kg/m   Estimated body mass index is 39.46 kg/m  as calculated from the following:    Height as of this encounter: 1.727 m (5' 8\").    Weight as of this encounter: 117.7 kg (259 lb 8 oz).       Medication Reconciliation: Complete    Anabell Mallory LPN .......  11/2/2022  11:39 AM   "

## 2022-11-02 NOTE — PATIENT INSTRUCTIONS
Results for orders placed or performed in visit on 11/02/22   XR Lumbar Spine 2/3 Views     Status: None    Narrative    PROCEDURE: XR LUMBAR SPINE 2/3 VIEWS 11/2/2022 1:09 PM    HISTORY: Chronic bilateral low back pain without sciatica; Chronic  bilateral low back pain without sciatica; Numbness    COMPARISONS: 2/8/2022.    TECHNIQUE: 3 views.    FINDINGS: There is no compression fracture or malalignment. Bilateral  defects in the pars interarticularis were better seen on the CT.    There is mild degenerative disc disease at L3-4, L4-5 and L5-S1. There  is no focal bone lesion.         Impression    IMPRESSION: Degenerative change in the lower lumbar spine.    MITCHELL RODRIGUEZ MD         SYSTEM ID:  [id number]   UA reflex to Microscopic and Culture     Status: Abnormal    Specimen: Urine, Midstream   Result Value Ref Range    Color Urine Yellow Colorless, Straw, Light Yellow, Yellow    Appearance Urine Clear Clear    Glucose Urine Negative Negative mg/dL    Bilirubin Urine Negative Negative    Ketones Urine Negative Negative mg/dL    Specific Gravity Urine 1.026 1.000 - 1.030    Blood Urine Negative Negative    pH Urine 6.0 5.0 - 9.0    Protein Albumin Urine 20 (A) Negative mg/dL    Urobilinogen Urine 2.0 Normal, 2.0 mg/dL    Nitrite Urine Negative Negative    Leukocyte Esterase Urine Negative Negative    Bacteria Urine Few (A) None Seen /HPF    Mucus Urine Present (A) None Seen /LPF    RBC Urine <1 <=2 /HPF    WBC Urine 1 <=5 /HPF    Narrative    Urine Culture not indicated

## 2022-11-02 NOTE — PROGRESS NOTES
Assessment & Plan   Tenzin Ca is a 47 year old, presenting for the following health issues:      ICD-10-CM    1. Acute cystitis without hematuria  N30.00 sulfamethoxazole-trimethoprim (BACTRIM DS) 800-160 MG tablet      2. Painful urination  R30.9 UA reflex to Microscopic and Culture      3. Abnormal finding in urine  R82.90 Urine Culture Aerobic Bacterial     sulfamethoxazole-trimethoprim (BACTRIM DS) 800-160 MG tablet      4. Chronic bilateral low back pain without sciatica  M54.50 XR Lumbar Spine 2/3 Views    G89.29       5. Numbness  R20.0 XR Lumbar Spine 2/3 Views      6. Dysuria  R30.0         - exam and UA consistent with cystitis  - will add on urine culture and adjust treatment as indicated  - will treat with antibiotics, Bactrim BID x 7 days; encouraged intake of probiotics/yogurt while on antibiotics and for 1 week after  - educated on prevention including urination after intercourse, avoidance of bubble baths, douching, scented underware/pads/cosemetics  - may drink cranberry juice to help prevent  - Return/call as needed for follow-up should any new symptoms develop, for worsening of current symptoms or if symptoms do not resolve with above plan.    Has appt. With urology at the end of November.    Encouraged weight loss and regular exercise.     Discussed signs/ symptoms that would warrant urgent/ emergent follow-up. Patient in agreement with plan. AVS reviewed with patient. All questions and concerns addressed this visit.       Return if symptoms worsen or fail to improve, for Return as needed for new or worsening symptoms.    CORBIN Cunningham CNP  [facility]        Rod Dave is a 47 year old, presenting for the following health issues:  Knee Pain (Right knee pain ongoing for a couple weeks), Back Pain (Lower back pain since September 2021 ), Fall (Couple days ago, falls about 2-3 times daily), and Dysuria (Ongoing for 2 days )      Patient presents with 2 day  "history of worsening back pain, falling 2-3 times per day and loss of bladder function. He states that he can't control when he goes. This is new over the past \"couple of days\". Right lower extremity is weaker than left at baseline. \" Testicles hurt really bad and sting\".       Patient has tangential thoughts throughout visit. He is redirectable, but needs to be redirected frequently.     He was seen in the ED 8/1/22 and a CT scan was completed.              Review of Systems   Constitutional: Negative for chills and fever.   HENT: Negative for congestion.    Eyes: Negative for visual disturbance.   Respiratory: Negative for chest tightness and shortness of breath.    Cardiovascular: Negative for chest pain.   Gastrointestinal: Negative for abdominal pain.   Genitourinary: Positive for dysuria, frequency and urgency. Negative for decreased urine volume, difficulty urinating, enuresis, flank pain, genital sores and hematuria.   Musculoskeletal: Positive for back pain. Negative for myalgias, neck pain and neck stiffness.        Increase in falls per patient     Skin: Negative for rash and wound.   Neurological: Positive for numbness (right LE, baseline). Negative for dizziness, syncope, facial asymmetry, light-headedness and headaches.   Hematological: Does not bruise/bleed easily.   Psychiatric/Behavioral: Negative for confusion.            Objective    /72 (BP Location: Right arm, Patient Position: Sitting, Cuff Size: Adult Large)   Pulse 86   Temp 97.9  F (36.6  C) (Tympanic)   Resp 18   Ht 1.727 m (5' 8\")   Wt 117.7 kg (259 lb 8 oz)   SpO2 95%   BMI 39.46 kg/m    Body mass index is 39.46 kg/m .  Physical Exam  Constitutional:       General: He is not in acute distress.     Appearance: He is well-developed. He is not diaphoretic.   HENT:      Head: Normocephalic and atraumatic.   Eyes:      General: No scleral icterus.     Conjunctiva/sclera: Conjunctivae normal.   Cardiovascular:      Rate and Rhythm: " Normal rate and regular rhythm.   Pulmonary:      Effort: Pulmonary effort is normal.      Breath sounds: Normal breath sounds.   Abdominal:      General: Abdomen is flat. Bowel sounds are normal.      Palpations: Abdomen is soft.      Tenderness: There is no abdominal tenderness. There is no right CVA tenderness or left CVA tenderness.   Musculoskeletal:         General: No deformity. Normal range of motion.      Cervical back: Normal range of motion and neck supple.   Lymphadenopathy:      Cervical: No cervical adenopathy.   Skin:     General: Skin is warm and dry.      Capillary Refill: Capillary refill takes less than 2 seconds.      Coloration: Skin is not jaundiced.      Findings: No rash.   Neurological:      Mental Status: He is alert and oriented to person, place, and time. Mental status is at baseline.      GCS: GCS eye subscore is 4. GCS verbal subscore is 5. GCS motor subscore is 6.      Sensory: Sensation is intact.      Motor: Motor function is intact.      Coordination: Coordination is intact.      Gait: Gait is intact.   Psychiatric:         Mood and Affect: Mood normal.         Behavior: Behavior normal.            Results for orders placed or performed in visit on 11/02/22   XR Lumbar Spine 2/3 Views     Status: None    Narrative    PROCEDURE: XR LUMBAR SPINE 2/3 VIEWS 11/2/2022 1:09 PM    HISTORY: Chronic bilateral low back pain without sciatica; Chronic  bilateral low back pain without sciatica; Numbness    COMPARISONS: 2/8/2022.    TECHNIQUE: 3 views.    FINDINGS: There is no compression fracture or malalignment. Bilateral  defects in the pars interarticularis were better seen on the CT.    There is mild degenerative disc disease at L3-4, L4-5 and L5-S1. There  is no focal bone lesion.         Impression    IMPRESSION: Degenerative change in the lower lumbar spine.    MITCHELL RODRIGUEZ MD         SYSTEM ID:  [id number]    reflex to Microscopic and Culture     Status: Abnormal    Specimen: Urine,  Midstream   Result Value Ref Range    Color Urine Yellow Colorless, Straw, Light Yellow, Yellow    Appearance Urine Clear Clear    Glucose Urine Negative Negative mg/dL    Bilirubin Urine Negative Negative    Ketones Urine Negative Negative mg/dL    Specific Gravity Urine 1.026 1.000 - 1.030    Blood Urine Negative Negative    pH Urine 6.0 5.0 - 9.0    Protein Albumin Urine 20 (A) Negative mg/dL    Urobilinogen Urine 2.0 Normal, 2.0 mg/dL    Nitrite Urine Negative Negative    Leukocyte Esterase Urine Negative Negative    Bacteria Urine Few (A) None Seen /HPF    Mucus Urine Present (A) None Seen /LPF    RBC Urine <1 <=2 /HPF    WBC Urine 1 <=5 /HPF    Narrative    Urine Culture not indicated

## 2022-11-04 LAB — BACTERIA UR CULT: NO GROWTH

## 2022-11-28 ENCOUNTER — OFFICE VISIT (OUTPATIENT)
Dept: UROLOGY | Facility: OTHER | Age: 47
End: 2022-11-28
Attending: UROLOGY
Payer: COMMERCIAL

## 2022-11-28 VITALS
WEIGHT: 258 LBS | DIASTOLIC BLOOD PRESSURE: 80 MMHG | RESPIRATION RATE: 16 BRPM | SYSTOLIC BLOOD PRESSURE: 126 MMHG | OXYGEN SATURATION: 97 % | BODY MASS INDEX: 39.23 KG/M2 | HEART RATE: 60 BPM

## 2022-11-28 DIAGNOSIS — R30.0 DYSURIA: Primary | ICD-10-CM

## 2022-11-28 PROCEDURE — G0463 HOSPITAL OUTPT CLINIC VISIT: HCPCS | Mod: 25

## 2022-11-28 PROCEDURE — 99213 OFFICE O/P EST LOW 20 MIN: CPT | Performed by: UROLOGY

## 2022-11-28 PROCEDURE — 51798 US URINE CAPACITY MEASURE: CPT | Performed by: UROLOGY

## 2022-11-28 ASSESSMENT — PAIN SCALES - GENERAL: PAINLEVEL: MODERATE PAIN (5)

## 2022-11-28 NOTE — NURSING NOTE
Pt presents to clinic for frequent/painful urination  Review of Systems:    Weight loss:    No     Recent fever/chills:  No   Night sweats:   No  Current skin rash:  No   Recent hair loss:  No  Heat intolerance:  No   Cold intolerance:  No  Chest pain:   No   Palpitations:   No  Shortness of breath:  No   Wheezing:   No  Constipation:    No   Diarrhea:   No   Nausea:   No   Vomiting:   No   Kidney/side pain:  No   Back pain:   No  Frequent headaches:  No   Dizziness:     No  Leg swelling:   No   Calf pain:    No    Post-Void Residual  A post-void residual was measured by ultrasonic bladder scanner.  70 mL  Magalie Comer LPN  11/28/2022 10:22 AM

## 2022-11-28 NOTE — PROGRESS NOTES
Type of Visit  EST    Chief Complaint  Painful urination  ED    HPI  Mr. Ca is a 47 year old male with history of schizophrenia who follows up with urinary complaints and ED.  The patient complains of burning and difficulty with urination.  He has undergone urinalysis twice in the last month.  He denies gross hematuria and fevers.    In addition the patient is managed for erectile dysfunction.  His ED issues started about 7 years ago.  He was using sildenafil successfully for about 2 years.  More recently he has noticed this has not been beneficial.  He presents today requesting to discuss alternative treatment options.  No changes in health since last visit.      Review of Systems  I personally reviewed the ROS with the patient.    Nursing Notes:   Radha Cano LPN  11/28/2022 10:20 AM  Addendum  Pt presents to clinic for frequent/painful urination  Review of Systems:    Weight loss:    No     Recent fever/chills:  No   Night sweats:   No  Current skin rash:  No   Recent hair loss:  No  Heat intolerance:  No   Cold intolerance:  No  Chest pain:   No   Palpitations:   No  Shortness of breath:  No   Wheezing:   No  Constipation:    No   Diarrhea:   No   Nausea:   No   Vomiting:   No   Kidney/side pain:  No   Back pain:   No  Frequent headaches:  No   Dizziness:     No  Leg swelling:   No   Calf pain:    No    Post-Void Residual  A post-void residual was measured by ultrasonic bladder scanner.  70 mL  Magalie Comer LPN  11/28/2022 10:22 AM          Physical Exam  Vitals:    11/28/22 1021   BP: 126/80   BP Location: Right arm   Patient Position: Sitting   Cuff Size: Adult Large   Pulse: 60   Resp: 16   SpO2: 97%   Weight: 117 kg (258 lb)   Constitutional: No acute distress.  Alert and cooperative   Pulmonary/Chest: Respirations are even and non-labored bilaterally, no audible wheezing  Abdominal: Soft. No distension, tenderness, masses or guarding.   Extremities: STEVO x 4, Warm. No clubbing.  No cyanosis.     Skin: Pink, warm and dry.  No visible rashes noted.  Back:  No left CVA tenderness.  No right CVA tenderness.  Genitourinary:  Nonpalpable bladder    Labs   10/17/22 10:30 11/02/22 11:23   Color Urine Light Yellow Yellow   Appearance Urine Clear Clear   Glucose Urine Negative Negative   Bilirubin Urine Negative Negative   Ketones Urine Negative Negative   Specific Gravity Urine 1.009 1.026   pH Urine 5.5 6.0   Protein Albumin Urine Negative 20 !   Urobilinogen mg/dL Normal 2.0   Nitrite Urine Negative Negative   Blood Urine Negative Negative   Leukocyte Esterase Urine Negative Negative   WBC Urine  1   RBC Urine  <1   Bacteria Urine  Few !     UCx        No Growth    Post-Void Residual  A post-void residual was measured by ultrasonic bladder scanner.  70 mL today    Assessment & Plan  Mr. Ca is a 47 year old male w/h/o Schizophrenia who follows up with subjective urinary complaints.  The patient has undergone 2 urinalyses in the last month.  Both completely clear in addition to the negative urine culture confirming the negative UA.  PVR today reveals he is emptying sufficiently well.  I reassured him that his testing is all negative for infection or any other abnormality requiring intervention or treatment.  Recommended increasing hydration given his most recent specific gravity revealed urine that was quite concentrated.    Regarding ED continue previously prescribed Cialis.                A total of 20 minutes was spent with the patient, reviewing records, tests, ordering medications, tests or procedures, counseling regarding the above described medical concern, recommendations regarding management and documenting clinical information in the EHR.

## 2023-05-06 ENCOUNTER — HOSPITAL ENCOUNTER (OUTPATIENT)
Dept: MRI IMAGING | Facility: OTHER | Age: 48
Discharge: HOME | End: 2023-05-06
Attending: ORTHOPAEDIC SURGERY | Admitting: ORTHOPAEDIC SURGERY
Payer: COMMERCIAL

## 2023-05-06 DIAGNOSIS — M25.074: ICD-10-CM

## 2023-05-06 PROCEDURE — 73718 MRI LOWER EXTREMITY W/O DYE: CPT | Mod: RT

## 2023-05-10 ENCOUNTER — HOSPITAL ENCOUNTER (EMERGENCY)
Facility: OTHER | Age: 48
Discharge: HOME | End: 2023-05-10
Attending: NURSE PRACTITIONER | Admitting: NURSE PRACTITIONER
Payer: COMMERCIAL

## 2023-05-10 ENCOUNTER — APPOINTMENT (OUTPATIENT)
Dept: CT IMAGING | Facility: OTHER | Age: 48
End: 2023-05-10
Attending: NURSE PRACTITIONER
Payer: COMMERCIAL

## 2023-05-10 VITALS
HEART RATE: 100 BPM | RESPIRATION RATE: 16 BRPM | OXYGEN SATURATION: 98 % | HEIGHT: 68 IN | TEMPERATURE: 98.5 F | DIASTOLIC BLOOD PRESSURE: 95 MMHG | WEIGHT: 258 LBS | SYSTOLIC BLOOD PRESSURE: 138 MMHG | BODY MASS INDEX: 39.1 KG/M2

## 2023-05-10 DIAGNOSIS — G89.29 CHRONIC BILATERAL THORACIC BACK PAIN: ICD-10-CM

## 2023-05-10 DIAGNOSIS — M54.6 CHRONIC BILATERAL THORACIC BACK PAIN: ICD-10-CM

## 2023-05-10 DIAGNOSIS — K64.4 EXTERNAL HEMORRHOIDS: ICD-10-CM

## 2023-05-10 LAB
ALBUMIN UR-MCNC: NEGATIVE MG/DL
ANION GAP SERPL CALCULATED.3IONS-SCNC: 10 MMOL/L (ref 7–15)
APPEARANCE UR: CLEAR
BASOPHILS # BLD AUTO: 0 10E3/UL (ref 0–0.2)
BASOPHILS NFR BLD AUTO: 1 %
BILIRUB UR QL STRIP: NEGATIVE
BUN SERPL-MCNC: 6.4 MG/DL (ref 6–20)
CALCIUM SERPL-MCNC: 9.6 MG/DL (ref 8.6–10)
CHLORIDE SERPL-SCNC: 102 MMOL/L (ref 98–107)
COLOR UR AUTO: NORMAL
CREAT SERPL-MCNC: 0.86 MG/DL (ref 0.51–1.17)
DEPRECATED HCO3 PLAS-SCNC: 25 MMOL/L (ref 22–29)
EOSINOPHIL # BLD AUTO: 0.1 10E3/UL (ref 0–0.7)
EOSINOPHIL NFR BLD AUTO: 2 %
ERYTHROCYTE [DISTWIDTH] IN BLOOD BY AUTOMATED COUNT: 12.1 % (ref 10–15)
GFR SERPL CREATININE-BSD FRML MDRD: >90 ML/MIN/1.73M2
GLUCOSE SERPL-MCNC: 106 MG/DL (ref 70–99)
GLUCOSE UR STRIP-MCNC: NEGATIVE MG/DL
HCT VFR BLD AUTO: 39.8 % (ref 35–53)
HGB BLD-MCNC: 14.5 G/DL (ref 11.7–17.7)
HGB UR QL STRIP: NEGATIVE
IMM GRANULOCYTES # BLD: 0.1 10E3/UL
IMM GRANULOCYTES NFR BLD: 1 %
KETONES UR STRIP-MCNC: NEGATIVE MG/DL
LEUKOCYTE ESTERASE UR QL STRIP: NEGATIVE
LYMPHOCYTES # BLD AUTO: 1.1 10E3/UL (ref 0.8–5.3)
LYMPHOCYTES NFR BLD AUTO: 18 %
MCH RBC QN AUTO: 31.4 PG (ref 26.5–33)
MCHC RBC AUTO-ENTMCNC: 36.4 G/DL (ref 31.5–36.5)
MCV RBC AUTO: 86 FL (ref 78–100)
MONOCYTES # BLD AUTO: 0.5 10E3/UL (ref 0–1.3)
MONOCYTES NFR BLD AUTO: 9 %
NEUTROPHILS # BLD AUTO: 4.4 10E3/UL (ref 1.6–8.3)
NEUTROPHILS NFR BLD AUTO: 69 %
NITRATE UR QL: NEGATIVE
NRBC # BLD AUTO: 0 10E3/UL
NRBC BLD AUTO-RTO: 0 /100
PH UR STRIP: 7 [PH] (ref 5–9)
PLATELET # BLD AUTO: 194 10E3/UL (ref 150–450)
POTASSIUM SERPL-SCNC: 4.3 MMOL/L (ref 3.4–5.3)
RBC # BLD AUTO: 4.62 10E6/UL (ref 3.8–5.9)
SODIUM SERPL-SCNC: 137 MMOL/L (ref 136–145)
SP GR UR STRIP: 1.01 (ref 1–1.03)
UROBILINOGEN UR STRIP-MCNC: NORMAL MG/DL
WBC # BLD AUTO: 6.3 10E3/UL (ref 4–11)

## 2023-05-10 PROCEDURE — 250N000011 HC RX IP 250 OP 636: Performed by: NURSE PRACTITIONER

## 2023-05-10 PROCEDURE — 36415 COLL VENOUS BLD VENIPUNCTURE: CPT | Performed by: NURSE PRACTITIONER

## 2023-05-10 PROCEDURE — 81003 URINALYSIS AUTO W/O SCOPE: CPT | Performed by: NURSE PRACTITIONER

## 2023-05-10 PROCEDURE — 85018 HEMOGLOBIN: CPT | Performed by: NURSE PRACTITIONER

## 2023-05-10 PROCEDURE — 87086 URINE CULTURE/COLONY COUNT: CPT | Performed by: NURSE PRACTITIONER

## 2023-05-10 PROCEDURE — 96374 THER/PROPH/DIAG INJ IV PUSH: CPT | Mod: XU | Performed by: NURSE PRACTITIONER

## 2023-05-10 PROCEDURE — 99284 EMERGENCY DEPT VISIT MOD MDM: CPT | Performed by: NURSE PRACTITIONER

## 2023-05-10 PROCEDURE — 99285 EMERGENCY DEPT VISIT HI MDM: CPT | Mod: 25 | Performed by: NURSE PRACTITIONER

## 2023-05-10 PROCEDURE — 82310 ASSAY OF CALCIUM: CPT | Performed by: NURSE PRACTITIONER

## 2023-05-10 PROCEDURE — 74177 CT ABD & PELVIS W/CONTRAST: CPT

## 2023-05-10 RX ORDER — LIDOCAINE HCL AND HYDROCORTISONE ACETATE 20; 20 MG/G; MG/G
1 CREAM RECTAL 2 TIMES DAILY
Qty: 1 KIT | Refills: 0 | Status: SHIPPED | OUTPATIENT
Start: 2023-05-10 | End: 2023-05-10

## 2023-05-10 RX ORDER — IOPAMIDOL 755 MG/ML
149 INJECTION, SOLUTION INTRAVASCULAR ONCE
Status: COMPLETED | OUTPATIENT
Start: 2023-05-10 | End: 2023-05-10

## 2023-05-10 RX ORDER — KETOROLAC TROMETHAMINE 15 MG/ML
15 INJECTION, SOLUTION INTRAMUSCULAR; INTRAVENOUS ONCE
Status: COMPLETED | OUTPATIENT
Start: 2023-05-10 | End: 2023-05-10

## 2023-05-10 RX ADMIN — IOPAMIDOL 149 ML: 755 INJECTION, SOLUTION INTRAVENOUS at 15:09

## 2023-05-10 RX ADMIN — KETOROLAC TROMETHAMINE 15 MG: 15 INJECTION, SOLUTION INTRAMUSCULAR; INTRAVENOUS at 13:45

## 2023-05-10 ASSESSMENT — ENCOUNTER SYMPTOMS
VOMITING: 1
PSYCHIATRIC NEGATIVE: 1
ABDOMINAL PAIN: 1
HEMATOLOGIC/LYMPHATIC NEGATIVE: 1
CONSTITUTIONAL NEGATIVE: 1
BLOOD IN STOOL: 1
ROS GI COMMENTS: ACID REFLUX
CARDIOVASCULAR NEGATIVE: 1
RECTAL PAIN: 1
RESPIRATORY NEGATIVE: 1
NEUROLOGICAL NEGATIVE: 1
MUSCULOSKELETAL NEGATIVE: 1
EYES NEGATIVE: 1

## 2023-05-10 ASSESSMENT — ACTIVITIES OF DAILY LIVING (ADL)
ADLS_ACUITY_SCORE: 35
ADLS_ACUITY_SCORE: 33

## 2023-05-10 NOTE — ED TRIAGE NOTES
Pt here by himself, pt reports severe and worsening back pain x 2 days, pt denies any recent injuries, pt also reports several episodes of bright red rectal bleeding today with pain, VSS, pt out into waiting room     Triage Assessment     Row Name 05/10/23 1052       Triage Assessment (Adult)    Airway WDL WDL       Respiratory WDL    Respiratory WDL WDL       Skin Circulation/Temperature WDL    Skin Circulation/Temperature WDL WDL       Cardiac WDL    Cardiac WDL WDL       Peripheral/Neurovascular WDL    Peripheral Neurovascular WDL WDL       Cognitive/Neuro/Behavioral WDL    Cognitive/Neuro/Behavioral WDL WDL

## 2023-05-10 NOTE — DISCHARGE INSTRUCTIONS
The labs and CT scan were reassuring today. I encourage you to please continue with physical therapy. I think that the bleeding from the rectum is related to the hemorrhoids that I was able to feel during the rectal exam. Please make sure you are drinking plenty of fluids and eating a high fiber diet. If you notice any hard stools, I recommend that you start taking Miralax. If the bleeding worsens or if you start passing blood clots, you will need to be re-evaluated in the ER. Please schedule an appointment with your PCP within one week to follow up regarding your pain and bleeding.

## 2023-05-10 NOTE — ED PROVIDER NOTES
History     Chief Complaint   Patient presents with     Back Pain     The history is provided by the patient. No  was used.     Tenzin Ca is a 47 year old adult with chronic bilateral low back pain without sciatic, S/P cervical spinal fusion, psychosis, blood per rectum, who presents to the emergency room for evaluation of worsening back pain in the last couple of days. He is currently undergoing physical therapy. He tells me that he takes ibuprofen for the pain with relieve. He has not taking any ibuprofen since last night. The other concern that he has is vomiting blood and rectal bleeding. He tells me that the vomiting has been intermittent in the last 48 hours. The rectal bleeding has been intermittent as well. He has a colonoscopy on June 16, 2022 which was negative for any abnormalities. He states that he will have the urge to go to the bathroom and when he tries to have a BM he usually has pain associated and then will notice the bleeding. He denies having history of hemorrhoids.     Allergies:  No Known Allergies    Problem List:    Patient Active Problem List    Diagnosis Date Noted     Blood per rectum 04/20/2022     Priority: Medium     Chronic bilateral low back pain without sciatica 02/08/2022     Priority: Medium     Numbness 11/22/2021     Priority: Medium     S/P cervical spinal fusion 10/14/2021     Priority: Medium     Fusion of spine of cervical region 09/20/2021     Priority: Medium     Myelopathy concurrent with and due to spinal stenosis of cervical region (H) 09/06/2021     Priority: Medium     Paranoid schizophrenia (H) 09/04/2021     Priority: Medium     Sebaceous cyst 05/26/2021     Priority: Medium     Skin nodule 05/26/2021     Priority: Medium     Morbid obesity (H) 05/12/2021     Priority: Medium     Non-intractable vomiting with nausea 05/12/2021     Priority: Medium     Vitamin D deficiency 12/26/2018     Priority: Medium     Slow transit constipation  02/22/2018     Priority: Medium     Hypertriglyceridemia 02/20/2018     Priority: Medium     Major depressive disorder, recurrent episode (H) 02/20/2018     Priority: Medium     Psoriasis 02/20/2018     Priority: Medium     Schizophrenia (H) 02/20/2018     Priority: Medium     Overview:   hears voices       Torn medial meniscus 02/20/2018     Priority: Medium     Obesity 02/21/2017     Priority: Medium     S/P left knee arthroscopy 07/05/2016     Priority: Medium     Psychosis (H) 07/02/2015     Priority: Medium     Erectile dysfunction 04/23/2014     Priority: Medium     Delayed ejaculation 06/03/2013     Priority: Medium        Past Medical History:    Past Medical History:   Diagnosis Date     Mental disorder      Personal history of other mental and behavioral disorders        Past Surgical History:    Past Surgical History:   Procedure Laterality Date     ARTHROSCOPY KNEE      Left Knee Arthroscopy and Partial Meniscectomy and Chondroplasty     COLONOSCOPY N/A 6/16/2022    Procedure: COLONOSCOPY;  Surgeon: Frank Toro MD;  Location:  OR       Family History:    Family History   Problem Relation Age of Onset     Substance Abuse Mother         Alcohol/Drug     Alcoholism Mother         Alcoholism     Substance Abuse Brother         Alcohol/Drug     Alcoholism Brother         Alcoholism     Substance Abuse Sister         Alcohol/Drug     Alcoholism Sister         Alcoholism       Social History:  Marital Status:  Single [1]  Social History     Tobacco Use     Smoking status: Never     Passive exposure: Yes     Smokeless tobacco: Never   Vaping Use     Vaping status: Never Used   Substance Use Topics     Alcohol use: No     Alcohol/week: 0.0 standard drinks of alcohol     Drug use: No        Medications:    ALPRAZolam (XANAX) 0.5 MG tablet  ARIPiprazole (ABILIFY) 30 MG tablet  ARIPiprazole (ABILIFY) 5 MG tablet  clonazePAM (KLONOPIN) 1 MG tablet  lidocaine (LIDODERM) 5 % patch  OXcarbazepine (TRILEPTAL) 300  "MG tablet  OXcarbazepine (TRILEPTAL) 600 MG tablet  polyethylene glycol (MIRALAX) 17 GM/Dose powder  prazosin (MINIPRESS) 1 MG capsule  predniSONE (DELTASONE) 20 MG tablet  senna-docusate (STOOL SOFTENER/LAXATIVE) 8.6-50 MG tablet  sildenafil (REVATIO) 20 MG tablet  tadalafil (CIALIS) 20 MG tablet  zolpidem (AMBIEN) 10 MG tablet          Review of Systems   Constitutional: Negative.    HENT: Negative.    Eyes: Negative.    Respiratory: Negative.    Cardiovascular: Negative.    Gastrointestinal: Positive for abdominal pain, blood in stool, rectal pain and vomiting.        Acid reflux   Genitourinary: Negative.    Musculoskeletal: Negative.    Neurological: Negative.    Hematological: Negative.    Psychiatric/Behavioral: Negative.        Physical Exam   BP: (!) 138/95  Pulse: 100  Temp: 98.5  F (36.9  C)  Resp: 16  Height: 172.7 cm (5' 8\")  Weight: 117 kg (258 lb)  SpO2: 98 %      Physical Exam  Vitals and nursing note reviewed.   Constitutional:       Appearance: Normal appearance. He is normal weight.   Cardiovascular:      Rate and Rhythm: Normal rate and regular rhythm.      Pulses: Normal pulses.      Heart sounds: Normal heart sounds. No murmur heard.     No friction rub. No gallop.   Pulmonary:      Effort: Pulmonary effort is normal.      Breath sounds: Normal breath sounds. No stridor. No wheezing, rhonchi or rales.   Abdominal:      General: Abdomen is flat. Bowel sounds are normal.      Palpations: Abdomen is soft. There is no mass.      Tenderness: There is abdominal tenderness. There is no right CVA tenderness, left CVA tenderness, guarding or rebound.      Hernia: No hernia is present.      Comments: Generalized tenderness with more pronounce tenderness to the epigastric region   Genitourinary:     Rectum: Tenderness and external hemorrhoid present.   Musculoskeletal:         General: Normal range of motion.   Skin:     General: Skin is warm.      Capillary Refill: Capillary refill takes less than 2 " "seconds.   Neurological:      General: No focal deficit present.      Mental Status: He is alert and oriented to person, place, and time. Mental status is at baseline.   Psychiatric:         Mood and Affect: Mood normal.         Behavior: Behavior normal.         Thought Content: Thought content normal.         Judgment: Judgment normal.         ED Course     Tenzin Ca is a 47 year old adult with chronic bilateral low back pain without sciatic, S/P cervical spinal fusion, psychosis, blood per rectum, who presents to the emergency room for evaluation of worsening back pain in the last couple of days. He is currently undergoing physical therapy. He tells me that he takes ibuprofen for the pain with relieve. He has not taking any ibuprofen since last night. The other concern that he has is vomiting a trace of blood and rectal bleeding. He tells me that the vomiting has been intermittent in the last 48 hours. The rectal bleeding has been intermittent as well. He has a colonoscopy on June 16, 2022 which was negative for any abnormalities. He states that he will have the urge to go to the bathroom and when he tries to have a BM he usually has pain associated and then will notice the bleeding. He denies having history of hemorrhoids. BP (!) 138/95   Pulse 100   Temp 98.5  F (36.9  C) (Tympanic)   Resp 16   Ht 1.727 m (5' 8\")   Wt 117 kg (258 lb)   SpO2 98%   BMI 39.23 kg/m    Physical exam revealed external hemorroids at 6 o'clock and tenderness. Otherwise reassuring exam.     Patient received 15mg of IV toradol. UA unremarkable, CBC unremarkable, BMP unremarkable. The CT scan was negative for intraperitoneal masses or inflammatory changes. There was mild degenerative changes present in the thoracic and lumbar spine.     I reviewed lab and imaging findings with the patient. I had a lengthy discussion with the patient. I explained that the rectal bleeding and pain that he is experiencing could be from the " external hemorrhoid that I was able to palpate during the rectal exam. As far as the worsening back pain, I explained that I am not sure why he is having worsening pain. My theory is that given that he recently started physical therapy, he might be experiencing soreness. I recommended that he should continue to proceed with physical therapy. Use heat or cold compresses. Given that his hemoglobin is normal and platelets are normal we discussed to continue to use ibuprofen as needed for pain. I explained that it is encouraging that he does not have incontinence of stool or urine and that he is able to continue to walk.    I encouraged him to follow up with PCP within one week to discuss further management of his back pain. If the bleeding worsened, then he might need to follow up with GI for consideration of endoscopy/colonscopy. I offered to send a cream to help with symptom relieved from the hemorrhoids. Patient declined offer.     Patient verbalized understanding and comfortable with discharge plan. All questions were answered to the best of knowledge.          Results for orders placed or performed during the hospital encounter of 05/10/23 (from the past 24 hour(s))   UA reflex to Microscopic   Result Value Ref Range    Color Urine Light Yellow Colorless, Straw, Light Yellow, Yellow    Appearance Urine Clear Clear    Glucose Urine Negative Negative mg/dL    Bilirubin Urine Negative Negative    Ketones Urine Negative Negative mg/dL    Specific Gravity Urine 1.010 1.000 - 1.030    Blood Urine Negative Negative    pH Urine 7.0 5.0 - 9.0    Protein Albumin Urine Negative Negative mg/dL    Urobilinogen Urine Normal Normal, 2.0 mg/dL    Nitrite Urine Negative Negative    Leukocyte Esterase Urine Negative Negative    Narrative    Microscopic not indicated   CBC with platelets differential    Narrative    The following orders were created for panel order CBC with platelets differential.  Procedure                                Abnormality         Status                     ---------                               -----------         ------                     CBC with platelets and d...[[id number]]                      Final result                 Please view results for these tests on the individual orders.   Basic metabolic panel   Result Value Ref Range    Sodium 137 136 - 145 mmol/L    Potassium 4.3 3.4 - 5.3 mmol/L    Chloride 102 98 - 107 mmol/L    Carbon Dioxide (CO2) 25 22 - 29 mmol/L    Anion Gap 10 7 - 15 mmol/L    Urea Nitrogen 6.4 6.0 - 20.0 mg/dL    Creatinine 0.86 0.51 - 1.17 mg/dL    Calcium 9.6 8.6 - 10.0 mg/dL    Glucose 106 (H) 70 - 99 mg/dL    GFR Estimate >90 >60 mL/min/1.73m2    Narrative    The sex of this patient cannot be reliably determined based on discrepancies in demographics (legal sex, sex assigned at birth, gender identity).  Both male and female reference ranges are provided where applicable.  Careful evaluation of the patient s results as compared to the gender specific reference intervals is required in this setting.    CBC with platelets and differential   Result Value Ref Range    WBC Count 6.3 4.0 - 11.0 10e3/uL    RBC Count 4.62 3.80 - 5.90 10e6/uL    Hemoglobin 14.5 11.7 - 17.7 g/dL    Hematocrit 39.8 35.0 - 53.0 %    MCV 86 78 - 100 fL    MCH 31.4 26.5 - 33.0 pg    MCHC 36.4 31.5 - 36.5 g/dL    RDW 12.1 10.0 - 15.0 %    Platelet Count 194 150 - 450 10e3/uL    % Neutrophils 69 %    % Lymphocytes 18 %    % Monocytes 9 %    % Eosinophils 2 %    % Basophils 1 %    % Immature Granulocytes 1 %    NRBCs per 100 WBC 0 <1 /100    Absolute Neutrophils 4.4 1.6 - 8.3 10e3/uL    Absolute Lymphocytes 1.1 0.8 - 5.3 10e3/uL    Absolute Monocytes 0.5 0.0 - 1.3 10e3/uL    Absolute Eosinophils 0.1 0.0 - 0.7 10e3/uL    Absolute Basophils 0.0 0.0 - 0.2 10e3/uL    Absolute Immature Granulocytes 0.1 <=0.4 10e3/uL    Absolute NRBCs 0.0 10e3/uL    Narrative    The sex of this patient cannot be reliably determined based  on discrepancies in demographics (legal sex, sex assigned at birth, gender identity).  Both male and female reference ranges are provided where applicable.  Careful evaluation of the patient s results as compared to the gender specific reference intervals is required in this setting.    CT Abdomen Pelvis w Contrast    Narrative    EXAMINATION: CT ABDOMEN PELVIS W CONTRAST, 5/10/2023 3:12 PM    TECHNIQUE:  Helical CT images from the lung bases through the  symphysis pubis were obtained  with IV contrast. Contrast dose:    COMPARISON: none    HISTORY: Generalized abd pain. Blood stools.    FINDINGS:    There is dependent atelectasis at the lung bases.    The liver is free of masses or biliary ductal enlargement. No  calcified gallstones are seen.    The the spleen and pancreas appear normal.    The adrenal glands are normal.    The right and left kidneys are free of masses or hydronephrosis.    The periaortic lymph nodes are normal in caliber.    No intraperitoneal masses or inflammatory changes are noted. The  appendix is normal.    In the pelvis the bladder and rectum appear normal.    Mild degenerative changes are present thoracic and lumbar spine.      Impression    IMPRESSION: No intraperitoneal masses or inflammatory changes.    HCET RAMIREZ MD         SYSTEM ID:  [id number]       Medications   ketorolac (TORADOL) injection 15 mg (15 mg Intravenous $Given 5/10/23 1345)   iopamidol (ISOVUE-370) solution 149 mL (149 mLs Intravenous $Given 5/10/23 1508)       Assessments & Plan (with Medical Decision Making)     I have reviewed the nursing notes.    I have reviewed the findings, diagnosis, plan and need for follow up with the patient.  Discharge home.       Medical Decision Making  The patient's presentation was of moderate complexity (a chronic illness mild to moderate exacerbation, progression, or side effect of treatment).    The patient's evaluation involved:  an assessment requiring an independent historian  (see separate area of note for details)  review of external note(s) from 3+ sources (see separate area of note for details)  ordering and/or review of 3+ test(s) in this encounter (see separate area of note for details)    The patient's management necessitated moderate risk (prescription drug management including medications given in the ED).      Discharge Medication List as of 5/10/2023  4:16 PM          Final diagnoses:   External hemorrhoids   Chronic bilateral thoracic back pain       5/10/2023   [facility]     Rosalia Cook NP  05/10/23 [id number]

## 2023-05-12 LAB — BACTERIA UR CULT: NO GROWTH

## 2023-05-23 ENCOUNTER — HOSPITAL ENCOUNTER (EMERGENCY)
Facility: OTHER | Age: 48
Discharge: HOME | End: 2023-05-23
Attending: PHYSICIAN ASSISTANT | Admitting: PHYSICIAN ASSISTANT
Payer: COMMERCIAL

## 2023-05-23 ENCOUNTER — APPOINTMENT (OUTPATIENT)
Dept: CT IMAGING | Facility: OTHER | Age: 48
End: 2023-05-23
Attending: PHYSICIAN ASSISTANT
Payer: COMMERCIAL

## 2023-05-23 VITALS
TEMPERATURE: 97.8 F | OXYGEN SATURATION: 98 % | RESPIRATION RATE: 14 BRPM | HEART RATE: 81 BPM | SYSTOLIC BLOOD PRESSURE: 129 MMHG | DIASTOLIC BLOOD PRESSURE: 76 MMHG

## 2023-05-23 DIAGNOSIS — R10.84 ABDOMINAL PAIN, GENERALIZED: ICD-10-CM

## 2023-05-23 DIAGNOSIS — R11.2 NAUSEA AND VOMITING, UNSPECIFIED VOMITING TYPE: ICD-10-CM

## 2023-05-23 LAB
ALBUMIN SERPL BCG-MCNC: 4.4 G/DL (ref 3.5–5.2)
ALBUMIN UR-MCNC: NEGATIVE MG/DL
ALP SERPL-CCNC: 102 U/L (ref 35–129)
ALT SERPL W P-5'-P-CCNC: 26 U/L (ref 10–50)
ANION GAP SERPL CALCULATED.3IONS-SCNC: 9 MMOL/L (ref 7–15)
APPEARANCE UR: CLEAR
AST SERPL W P-5'-P-CCNC: 24 U/L (ref 10–50)
BASOPHILS # BLD AUTO: 0 10E3/UL (ref 0–0.2)
BASOPHILS NFR BLD AUTO: 0 %
BILIRUB SERPL-MCNC: 0.3 MG/DL
BILIRUB UR QL STRIP: NEGATIVE
BUN SERPL-MCNC: 7.7 MG/DL (ref 6–20)
CALCIUM SERPL-MCNC: 9.2 MG/DL (ref 8.6–10)
CHLORIDE SERPL-SCNC: 104 MMOL/L (ref 98–107)
COLOR UR AUTO: YELLOW
CREAT SERPL-MCNC: 0.85 MG/DL (ref 0.51–1.17)
DEPRECATED HCO3 PLAS-SCNC: 25 MMOL/L (ref 22–29)
EOSINOPHIL # BLD AUTO: 0.1 10E3/UL (ref 0–0.7)
EOSINOPHIL NFR BLD AUTO: 1 %
ERYTHROCYTE [DISTWIDTH] IN BLOOD BY AUTOMATED COUNT: 11.9 % (ref 10–15)
GFR SERPL CREATININE-BSD FRML MDRD: >90 ML/MIN/1.73M2
GLUCOSE SERPL-MCNC: 120 MG/DL (ref 70–99)
GLUCOSE UR STRIP-MCNC: NEGATIVE MG/DL
HCT VFR BLD AUTO: 40.2 % (ref 35–53)
HGB BLD-MCNC: 14.5 G/DL (ref 11.7–17.7)
HGB UR QL STRIP: NEGATIVE
HOLD SPECIMEN: NORMAL
HOLD SPECIMEN: NORMAL
IMM GRANULOCYTES # BLD: 0 10E3/UL
IMM GRANULOCYTES NFR BLD: 0 %
KETONES UR STRIP-MCNC: NEGATIVE MG/DL
LACTATE SERPL-SCNC: 1.4 MMOL/L (ref 0.7–2)
LEUKOCYTE ESTERASE UR QL STRIP: NEGATIVE
LIPASE SERPL-CCNC: 14 U/L (ref 13–60)
LYMPHOCYTES # BLD AUTO: 0.9 10E3/UL (ref 0.8–5.3)
LYMPHOCYTES NFR BLD AUTO: 17 %
MCH RBC QN AUTO: 31.2 PG (ref 26.5–33)
MCHC RBC AUTO-ENTMCNC: 36.1 G/DL (ref 31.5–36.5)
MCV RBC AUTO: 87 FL (ref 78–100)
MONOCYTES # BLD AUTO: 0.4 10E3/UL (ref 0–1.3)
MONOCYTES NFR BLD AUTO: 8 %
MUCOUS THREADS #/AREA URNS LPF: PRESENT /LPF
NEUTROPHILS # BLD AUTO: 3.7 10E3/UL (ref 1.6–8.3)
NEUTROPHILS NFR BLD AUTO: 74 %
NITRATE UR QL: NEGATIVE
NRBC # BLD AUTO: 0 10E3/UL
NRBC BLD AUTO-RTO: 0 /100
PH UR STRIP: 8 [PH] (ref 5–9)
PLATELET # BLD AUTO: 191 10E3/UL (ref 150–450)
POTASSIUM SERPL-SCNC: 3.9 MMOL/L (ref 3.4–5.3)
PROT SERPL-MCNC: 7 G/DL (ref 6.4–8.3)
RBC # BLD AUTO: 4.65 10E6/UL (ref 3.8–5.9)
RBC URINE: 0 /HPF
SODIUM SERPL-SCNC: 138 MMOL/L (ref 136–145)
SP GR UR STRIP: 1.01 (ref 1–1.03)
TSH SERPL DL<=0.005 MIU/L-ACNC: 1.84 UIU/ML (ref 0.3–4.2)
UROBILINOGEN UR STRIP-MCNC: NORMAL MG/DL
WBC # BLD AUTO: 5.2 10E3/UL (ref 4–11)
WBC URINE: <1 /HPF

## 2023-05-23 PROCEDURE — 83690 ASSAY OF LIPASE: CPT | Performed by: PHYSICIAN ASSISTANT

## 2023-05-23 PROCEDURE — 96374 THER/PROPH/DIAG INJ IV PUSH: CPT | Mod: XU | Performed by: PHYSICIAN ASSISTANT

## 2023-05-23 PROCEDURE — 83605 ASSAY OF LACTIC ACID: CPT | Performed by: PHYSICIAN ASSISTANT

## 2023-05-23 PROCEDURE — 250N000011 HC RX IP 250 OP 636: Performed by: PHYSICIAN ASSISTANT

## 2023-05-23 PROCEDURE — 74177 CT ABD & PELVIS W/CONTRAST: CPT

## 2023-05-23 PROCEDURE — 99284 EMERGENCY DEPT VISIT MOD MDM: CPT | Performed by: PHYSICIAN ASSISTANT

## 2023-05-23 PROCEDURE — 96361 HYDRATE IV INFUSION ADD-ON: CPT | Performed by: PHYSICIAN ASSISTANT

## 2023-05-23 PROCEDURE — 82947 ASSAY GLUCOSE BLOOD QUANT: CPT | Performed by: PHYSICIAN ASSISTANT

## 2023-05-23 PROCEDURE — 85018 HEMOGLOBIN: CPT | Performed by: PHYSICIAN ASSISTANT

## 2023-05-23 PROCEDURE — 99285 EMERGENCY DEPT VISIT HI MDM: CPT | Mod: 25 | Performed by: PHYSICIAN ASSISTANT

## 2023-05-23 PROCEDURE — 81003 URINALYSIS AUTO W/O SCOPE: CPT | Performed by: PHYSICIAN ASSISTANT

## 2023-05-23 PROCEDURE — 36415 COLL VENOUS BLD VENIPUNCTURE: CPT | Performed by: PHYSICIAN ASSISTANT

## 2023-05-23 PROCEDURE — 258N000003 HC RX IP 258 OP 636: Performed by: PHYSICIAN ASSISTANT

## 2023-05-23 PROCEDURE — 80051 ELECTROLYTE PANEL: CPT | Performed by: PHYSICIAN ASSISTANT

## 2023-05-23 PROCEDURE — 84443 ASSAY THYROID STIM HORMONE: CPT | Performed by: PHYSICIAN ASSISTANT

## 2023-05-23 RX ORDER — SODIUM CHLORIDE 9 MG/ML
INJECTION, SOLUTION INTRAVENOUS CONTINUOUS
Status: DISCONTINUED | OUTPATIENT
Start: 2023-05-23 | End: 2023-05-23 | Stop reason: HOSPADM

## 2023-05-23 RX ORDER — ONDANSETRON 2 MG/ML
4 INJECTION INTRAMUSCULAR; INTRAVENOUS ONCE
Status: COMPLETED | OUTPATIENT
Start: 2023-05-23 | End: 2023-05-23

## 2023-05-23 RX ORDER — ONDANSETRON 4 MG/1
4 TABLET, ORALLY DISINTEGRATING ORAL EVERY 8 HOURS PRN
Qty: 25 TABLET | Refills: 0 | Status: SHIPPED | OUTPATIENT
Start: 2023-05-23

## 2023-05-23 RX ORDER — IOPAMIDOL 755 MG/ML
149 INJECTION, SOLUTION INTRAVASCULAR ONCE
Status: COMPLETED | OUTPATIENT
Start: 2023-05-23 | End: 2023-05-23

## 2023-05-23 RX ADMIN — SODIUM CHLORIDE: 9 INJECTION, SOLUTION INTRAVENOUS at 17:15

## 2023-05-23 RX ADMIN — SODIUM CHLORIDE 1000 ML: 9 INJECTION, SOLUTION INTRAVENOUS at 15:54

## 2023-05-23 RX ADMIN — IOPAMIDOL 149 ML: 755 INJECTION, SOLUTION INTRAVENOUS at 15:40

## 2023-05-23 RX ADMIN — ONDANSETRON 4 MG: 2 INJECTION INTRAMUSCULAR; INTRAVENOUS at 15:54

## 2023-05-23 ASSESSMENT — ACTIVITIES OF DAILY LIVING (ADL)
ADLS_ACUITY_SCORE: 35
ADLS_ACUITY_SCORE: 35

## 2023-05-23 NOTE — ED TRIAGE NOTES
Patient here by EMS after vomiting when he eats for the last two weeks.  Patient able to keep water and Gatorade down he states.BP (!) 142/89   Pulse 83   Temp 97.4  F (36.3  C) (Tympanic)   Resp 14   SpO2 97%        Triage Assessment     Row Name 05/23/23 1346       Triage Assessment (Adult)    Airway WDL WDL       Respiratory WDL    Respiratory WDL WDL       Skin Circulation/Temperature WDL    Skin Circulation/Temperature WDL WDL       Cardiac WDL    Cardiac WDL WDL       Peripheral/Neurovascular WDL    Peripheral Neurovascular WDL WDL       Cognitive/Neuro/Behavioral WDL    Cognitive/Neuro/Behavioral WDL WDL

## 2023-05-23 NOTE — DISCHARGE INSTRUCTIONS
-Take Zofran 4 mg every 6 hours as needed for nausea.  -Follow-up with gastroenterology for further evaluation of your abdominal pain, nausea, vomiting  -Please return to the ER if any worsening symptoms.

## 2023-05-24 ENCOUNTER — TELEPHONE (OUTPATIENT)
Dept: SURGERY | Facility: OTHER | Age: 48
End: 2023-05-24
Payer: COMMERCIAL

## 2023-05-24 NOTE — TELEPHONE ENCOUNTER
Patient had colonoscopy 6/22. Patient needs to see primary care before being scheduled. ED note is not completed. . Thanks! Ashley Garvin MD on 5/24/2023 at 12:14 PM

## 2023-05-24 NOTE — TELEPHONE ENCOUNTER
[facility] Diagnostic Referral    Patient has a referral for an EGD and C-Scope with a diagnosis of Nausea and vomiting, unspecified vomiting type and Abdominal pain, generalized.    Please advise.    Thank you,    Diana Will on 5/24/2023 at 9:58 AM

## 2023-05-27 ASSESSMENT — ENCOUNTER SYMPTOMS
ABDOMINAL PAIN: 1
VOMITING: 1
NAUSEA: 1

## 2023-05-27 NOTE — ED PROVIDER NOTES
EMERGENCY DEPARTMENT ENCOUNTER      NAME: Tenzin Ca  AGE: 47 year old adult  YOB: 1975  MRN: [id number]  EVALUATION DATE & TIME: 5/23/2023  1:50 PM    PCP: Ervin Hassan    ED PROVIDER: Vincenzo Ramon PA-C       CHIEF COMPLAINT:  Chief Complaint   Patient presents with     Nausea & Vomiting       FINAL IMPRESSION:  1. Nausea and vomiting, unspecified vomiting type    2. Abdominal pain, generalized        ED COURSE, MEDICAL DECISION MAKING, ASSESSMENT, AND PLAN:      The patient was interviewed and examined.  HPI and physical exam as below.  Differential diagnosis and MDM Key Documentation Elements as below.  Vitals and triage note were reviewed.  /76   Pulse 81   Temp 97.8  F (36.6  C) (Tympanic)   Resp 14   SpO2 98%     Overall Impression/Treatment Plan/Discharge Info/Follow-up/Medical Necessity for Admission:  Tenzin Ca is a pleasant 47 year old adult who presents to the ER today for concerns of nausea and vomiting abdominal discomfort.  Patient states that this is a chronic issue.  Has been ongoing for quite some time worse over the past 2 weeks.  Has not been evaluated by GI for this before.  Patient states he can barely keep water and Gatorade down.  Vomits after eating.  Abdominal discomfort also increases after eating.  No constipation or diarrhea.  No prior abdominal surgeries.  No bloody/melanotic stools.  No chest pain, shortness of breath, fever, chills, headache, or lightheadedness.  Patient is unsure of what is causing his problem    Differential includes but is not limited to colitis, gastroenteritis, small bowel obstruction, cholecystitis, pancreatitis    Patient is afebrile with otherwise normal vitals.  Patient was in no acute distress.  Physical exam today revealed only mild generalized abdominal tenderness to palpation without rebound or guarding.  No localization.  Exam today was otherwise benign.    CBC showed no leukocytosis or left shift.  Normal  renal hepatic function.  Normal lipase.  Normal lactic acid.  No signs of pancreatitis, cholecystitis, or mesenteric ischemia.  UA was negative for hematuria or UTI, no signs of pyelonephritis, or nephrolithiasis.  TSH was normal.  Nomothetic abnormalities.  CT of the abdomen pelvis IV contrast today was unremarkable    Patient was given IV fluids and IV Zofran with improvement in nausea    Assessment/plan:  1. Generalized abdominal pain  -Unsure to as the cause.  Negative work-up.  Will refer to GI for further evaluation.  Zofran for home.  Return to the ER for any worsening of symptoms    Reassessments, Medications, Interventions, & Response to Treatments:  Ultimate reassessments.  Nausea improved with use of interventions.  Discussed treatment plan and follow-up.  Discussed laboratory and imaging results.    Consultations:  None    Decision Rules, Medical Calculators, and Risk Stratification Tools:  None    MDM Key Documentation Elements for Patient's Evaluation:  1. Differential diagnosis to include high risk considerations: As above  2. Escalation to admission/observation considered: Admission/observation considered, but patient does not meet admission criteria  3. Discussions and management with other clinicians:    3a. Independent interpretation of testing performed by another health professional:  -No  3b. Discussion of management or test interpretation with another health professional: -No  4. Independent interpretation of tests:  Ordering and/or review of 3+ test(s); review of 3+ test result(s) ordered prior to this encounter  5. Discussion of test interpretations with radiology:  No  6. History obtained from source other than patient or assessment requiring an independent historian:  No  7. Review of non-ED/external records:  review of 3+ records  8. Diagnostic tests considered but not ultimately performed/deferred:  -  9. Prescription medications considered but not prescribed:  -  10. Chronic conditions  affecting care:  -  11. Care affected by social determinants of health:  -None    The patient's management involved:   - Laboratory studies  - Imaging studies  - Parenteral controlled substance  - Prescription drug management      Pertinent Labs & Imaging studies reviewed. (See chart for details)  Results for orders placed or performed during the hospital encounter of 05/23/23   CT Abdomen Pelvis w Contrast    Impression    IMPRESSION:   No evidence of acute abnormality.    Nonacute findings as described above are similar in appearance  compared to prior study.    This facility minimizes radiation dose by adjusting the mA and/or kV  according to each patient size.    This CT scan was performed using one or more the following dose  reduction techniques:    -Automated exposure control,  -Adjustment of the mA and/or kV according to patient's size, and/or,  -Use of iterative reconstruction technique.    ZOILA MARTINEZ MD         SYSTEM ID:  [id number]   Comprehensive metabolic panel   Result Value Ref Range    Sodium 138 136 - 145 mmol/L    Potassium 3.9 3.4 - 5.3 mmol/L    Chloride 104 98 - 107 mmol/L    Carbon Dioxide (CO2) 25 22 - 29 mmol/L    Anion Gap 9 7 - 15 mmol/L    Urea Nitrogen 7.7 6.0 - 20.0 mg/dL    Creatinine 0.85 0.51 - 1.17 mg/dL    Calcium 9.2 8.6 - 10.0 mg/dL    Glucose 120 (H) 70 - 99 mg/dL    Alkaline Phosphatase 102 35 - 129 U/L    AST 24 10 - 50 U/L    ALT 26 10 - 50 U/L    Protein Total 7.0 6.4 - 8.3 g/dL    Albumin 4.4 3.5 - 5.2 g/dL    Bilirubin Total 0.3 <=1.2 mg/dL    GFR Estimate >90 >60 mL/min/1.73m2   Result Value Ref Range    Lipase 14 13 - 60 U/L   Lactic acid whole blood   Result Value Ref Range    Lactic Acid 1.4 0.7 - 2.0 mmol/L   TSH Reflex GH   Result Value Ref Range    TSH 1.84 0.30 - 4.20 uIU/mL   UA with Microscopic reflex to Culture    Specimen: Urine, NOS   Result Value Ref Range    Color Urine Yellow Colorless, Straw, Light Yellow, Yellow    Appearance Urine Clear Clear     Glucose Urine Negative Negative mg/dL    Bilirubin Urine Negative Negative    Ketones Urine Negative Negative mg/dL    Specific Gravity Urine 1.008 1.000 - 1.030    Blood Urine Negative Negative    pH Urine 8.0 5.0 - 9.0    Protein Albumin Urine Negative Negative mg/dL    Urobilinogen Urine Normal Normal, 2.0 mg/dL    Nitrite Urine Negative Negative    Leukocyte Esterase Urine Negative Negative    Mucus Urine Present (A) None Seen /LPF    RBC Urine 0 <=2 /HPF    WBC Urine <1 <=5 /HPF   Extra Blue Top Tube   Result Value Ref Range    Hold Specimen JIC    Extra Red Top Tube   Result Value Ref Range    Hold Specimen hold    CBC with platelets and differential   Result Value Ref Range    WBC Count 5.2 4.0 - 11.0 10e3/uL    RBC Count 4.65 3.80 - 5.90 10e6/uL    Hemoglobin 14.5 11.7 - 17.7 g/dL    Hematocrit 40.2 35.0 - 53.0 %    MCV 87 78 - 100 fL    MCH 31.2 26.5 - 33.0 pg    MCHC 36.1 31.5 - 36.5 g/dL    RDW 11.9 10.0 - 15.0 %    Platelet Count 191 150 - 450 10e3/uL    % Neutrophils 74 %    % Lymphocytes 17 %    % Monocytes 8 %    % Eosinophils 1 %    % Basophils 0 %    % Immature Granulocytes 0 %    NRBCs per 100 WBC 0 <1 /100    Absolute Neutrophils 3.7 1.6 - 8.3 10e3/uL    Absolute Lymphocytes 0.9 0.8 - 5.3 10e3/uL    Absolute Monocytes 0.4 0.0 - 1.3 10e3/uL    Absolute Eosinophils 0.1 0.0 - 0.7 10e3/uL    Absolute Basophils 0.0 0.0 - 0.2 10e3/uL    Absolute Immature Granulocytes 0.0 <=0.4 10e3/uL    Absolute NRBCs 0.0 10e3/uL     No results found for: ABORH      A shared decision making model was used. Plan and all results were discussed  Time was taken to answer all questions. Patient and/or associated parties understood and were agreeable to treatment plan.  Warning signs and close return precautions to return to the ED given. Copy of results given. Discharged in stable condition. Discharged with discharge instructions outlining plan for further care and follow up.        PPE worn during patient  evaluation:  Mask: Yes, surgical  Eye Protection: No  Gown: No  Hair cover: No  Face Shield: No  Patient wearing a mask: yes      MEDICATIONS GIVEN IN THE EMERGENCY:  Medications   0.9% sodium chloride BOLUS (0 mLs Intravenous Stopped 5/23/23 1707)   ondansetron (ZOFRAN) injection 4 mg (4 mg Intravenous $Given 5/23/23 1551)   iopamidol (ISOVUE-370) solution 149 mL (149 mLs Intravenous $Given 5/23/23 1540)       NEW PRESCRIPTIONS STARTED AT TODAY'S ER VISIT:  Discharge Medication List as of 5/23/2023  6:08 PM      START taking these medications    Details   ondansetron (ZOFRAN ODT) 4 MG ODT tab Take 1 tablet (4 mg) by mouth every 8 hours as needed for nausea, Disp-25 tablet, R-0, E-Prescribe                =================================================================    HPI  Tenzin Ca is a pleasant 47 year old adult who presents to the ER today for concerns of nausea and vomiting abdominal discomfort.  Patient states that this is a chronic issue.  Has been ongoing for quite some time worse over the past 2 weeks.  Has not been evaluated by GI for this before.  Patient states he can barely keep water and Gatorade down.  Vomits after eating.  Abdominal discomfort also increases after eating.  No constipation or diarrhea.  No prior abdominal surgeries.  No bloody/melanotic stools.  No chest pain, shortness of breath, fever, chills, headache, or lightheadedness.  Patient is unsure of what is causing his problem      REVIEW OF SYSTEMS   Review of Systems   Gastrointestinal: Positive for abdominal pain, nausea and vomiting.       Remainder of systems reviewed, unless noted in HPI all others negative.      PAST MEDICAL HISTORY:  Past Medical History:   Diagnosis Date     Mental disorder     Disability related to mental illness     Personal history of other mental and behavioral disorders     2016       PAST SURGICAL HISTORY:  Past Surgical History:   Procedure Laterality Date     ARTHROSCOPY KNEE      Left Knee  Arthroscopy and Partial Meniscectomy and Chondroplasty     COLONOSCOPY N/A 6/16/2022    Procedure: COLONOSCOPY;  Surgeon: Frank Toro MD;  Location:  OR           CURRENT MEDICATIONS:    Current Outpatient Medications   Medication Instructions     ALPRAZolam (XANAX) 0.5 MG tablet TAKE 1/2 TO 1 TAB BY MOUTH PRIOR TO AN EVENT FOR SEVERE ANXIETY. TAKE A 2ND TAB IF NEEDED APPROXIMATELY 40 MINUTES AFTER 1ST DOSE.     ARIPiprazole (ABILIFY) 5 MG tablet TAKE 1 TABLET BY MOUTH EVERY DAY WITH 20MG TABLET     ARIPiprazole (ABILIFY) 30 mg, Oral, EVERY MORNING     clonazePAM (KLONOPIN) 1 MG tablet TAKE 1 TABLET BY MOUTH 3 TIMES DAILY AS NEEDED FOR SEVERE ANXIETY AND VOICES     lidocaine (LIDODERM) 5 % patch PLACE 2 PATCHES ONTO THE SKIN EVERY 24 HOURS FOR 10 DAYS     ondansetron (ZOFRAN ODT) 4 mg, Oral, EVERY 8 HOURS PRN     OXcarbazepine (TRILEPTAL) 300 mg, Oral, EVERY MORNING     OXcarbazepine (TRILEPTAL) 600 mg, Oral, AT BEDTIME     polyethylene glycol (MIRALAX) 17 g, Oral, DAILY     prazosin (MINIPRESS) 1 MG capsule 1 capsule, Oral, AT BEDTIME PRN     predniSONE (DELTASONE) 20 MG tablet Take two tablets (= 40mg) each day for 5 (five) days, then one tablet (20 mg) each day x 5 days until finished     senna-docusate (STOOL SOFTENER/LAXATIVE) 8.6-50 MG tablet 1 tablet, Oral, 2 TIMES DAILY     sildenafil (REVATIO) 20 MG tablet TAKE 3-5 (60-100MG) TABLETSBY MOUTH 1 HOUR PRIOR TO SEXUAL ACTIVITY     tadalafil (CIALIS) 20 MG tablet TAKE 0.5-1 TABLET BY MOUTH 2 HOURS PRIOR TO SEXUAL ACTIVITY     zolpidem (AMBIEN) 10 mg, Oral, AT BEDTIME       ALLERGIES:  Allergies   Allergen Reactions     Tylenol [Acetaminophen]        FAMILY HISTORY:  Family History   Problem Relation Age of Onset     Substance Abuse Mother         Alcohol/Drug     Alcoholism Mother         Alcoholism     Substance Abuse Brother         Alcohol/Drug     Alcoholism Brother         Alcoholism     Substance Abuse Sister         Alcohol/Drug     Alcoholism  Sister         Alcoholism       SOCIAL HISTORY:   Social History     Socioeconomic History     Marital status: Single   Tobacco Use     Smoking status: Never     Passive exposure: Yes     Smokeless tobacco: Never   Vaping Use     Vaping status: Never Used   Substance and Sexual Activity     Alcohol use: No     Alcohol/week: 0.0 standard drinks of alcohol     Drug use: No     Sexual activity: Yes     Partners: Female     Comment: doctor to address    Social History Narrative    Disabled secondary to schizophrenia. Lives independently in his own apartment.       PHYSICAL EXAM    VITAL SIGNS: /76   Pulse 81   Temp 97.8  F (36.6  C) (Tympanic)   Resp 14   SpO2 98%     No data found.    Physical Exam  Vitals and nursing note reviewed.   Constitutional:       General: He is not in acute distress.     Appearance: Normal appearance. He is not ill-appearing or diaphoretic.   HENT:      Nose: Nose normal.      Mouth/Throat:      Mouth: Mucous membranes are moist.      Pharynx: Oropharynx is clear.   Eyes:      Extraocular Movements: Extraocular movements intact.      Conjunctiva/sclera: Conjunctivae normal.      Pupils: Pupils are equal, round, and reactive to light.   Cardiovascular:      Rate and Rhythm: Normal rate and regular rhythm.      Pulses: Normal pulses.      Heart sounds: Normal heart sounds.   Pulmonary:      Effort: Pulmonary effort is normal.      Breath sounds: Normal breath sounds.   Abdominal:      General: Abdomen is flat. Bowel sounds are normal.      Palpations: Abdomen is soft.      Tenderness: There is generalized abdominal tenderness. There is no right CVA tenderness, left CVA tenderness or guarding.   Musculoskeletal:         General: Normal range of motion.      Cervical back: Normal range of motion and neck supple.   Skin:     General: Skin is warm and dry.      Capillary Refill: Capillary refill takes less than 2 seconds.   Neurological:      General: No focal deficit present.      Mental  Status: He is alert and oriented to person, place, and time.   Psychiatric:         Mood and Affect: Mood normal.          LABS & RADIOLOGY:  All pertinent labs reviewed and interpreted. Reviewed all pertinent imaging. Please see official radiology report.  Results for orders placed or performed during the hospital encounter of 05/23/23   CT Abdomen Pelvis w Contrast    Impression    IMPRESSION:   No evidence of acute abnormality.    Nonacute findings as described above are similar in appearance  compared to prior study.    This facility minimizes radiation dose by adjusting the mA and/or kV  according to each patient size.    This CT scan was performed using one or more the following dose  reduction techniques:    -Automated exposure control,  -Adjustment of the mA and/or kV according to patient's size, and/or,  -Use of iterative reconstruction technique.    ZOILA MARTINEZ MD         SYSTEM ID:  [id number]   Comprehensive metabolic panel   Result Value Ref Range    Sodium 138 136 - 145 mmol/L    Potassium 3.9 3.4 - 5.3 mmol/L    Chloride 104 98 - 107 mmol/L    Carbon Dioxide (CO2) 25 22 - 29 mmol/L    Anion Gap 9 7 - 15 mmol/L    Urea Nitrogen 7.7 6.0 - 20.0 mg/dL    Creatinine 0.85 0.51 - 1.17 mg/dL    Calcium 9.2 8.6 - 10.0 mg/dL    Glucose 120 (H) 70 - 99 mg/dL    Alkaline Phosphatase 102 35 - 129 U/L    AST 24 10 - 50 U/L    ALT 26 10 - 50 U/L    Protein Total 7.0 6.4 - 8.3 g/dL    Albumin 4.4 3.5 - 5.2 g/dL    Bilirubin Total 0.3 <=1.2 mg/dL    GFR Estimate >90 >60 mL/min/1.73m2   Result Value Ref Range    Lipase 14 13 - 60 U/L   Lactic acid whole blood   Result Value Ref Range    Lactic Acid 1.4 0.7 - 2.0 mmol/L   TSH Reflex GH   Result Value Ref Range    TSH 1.84 0.30 - 4.20 uIU/mL   UA with Microscopic reflex to Culture    Specimen: Urine, NOS   Result Value Ref Range    Color Urine Yellow Colorless, Straw, Light Yellow, Yellow    Appearance Urine Clear Clear    Glucose Urine Negative Negative mg/dL     Bilirubin Urine Negative Negative    Ketones Urine Negative Negative mg/dL    Specific Gravity Urine 1.008 1.000 - 1.030    Blood Urine Negative Negative    pH Urine 8.0 5.0 - 9.0    Protein Albumin Urine Negative Negative mg/dL    Urobilinogen Urine Normal Normal, 2.0 mg/dL    Nitrite Urine Negative Negative    Leukocyte Esterase Urine Negative Negative    Mucus Urine Present (A) None Seen /LPF    RBC Urine 0 <=2 /HPF    WBC Urine <1 <=5 /HPF   Extra Blue Top Tube   Result Value Ref Range    Hold Specimen JIC    Extra Red Top Tube   Result Value Ref Range    Hold Specimen hold    CBC with platelets and differential   Result Value Ref Range    WBC Count 5.2 4.0 - 11.0 10e3/uL    RBC Count 4.65 3.80 - 5.90 10e6/uL    Hemoglobin 14.5 11.7 - 17.7 g/dL    Hematocrit 40.2 35.0 - 53.0 %    MCV 87 78 - 100 fL    MCH 31.2 26.5 - 33.0 pg    MCHC 36.1 31.5 - 36.5 g/dL    RDW 11.9 10.0 - 15.0 %    Platelet Count 191 150 - 450 10e3/uL    % Neutrophils 74 %    % Lymphocytes 17 %    % Monocytes 8 %    % Eosinophils 1 %    % Basophils 0 %    % Immature Granulocytes 0 %    NRBCs per 100 WBC 0 <1 /100    Absolute Neutrophils 3.7 1.6 - 8.3 10e3/uL    Absolute Lymphocytes 0.9 0.8 - 5.3 10e3/uL    Absolute Monocytes 0.4 0.0 - 1.3 10e3/uL    Absolute Eosinophils 0.1 0.0 - 0.7 10e3/uL    Absolute Basophils 0.0 0.0 - 0.2 10e3/uL    Absolute Immature Granulocytes 0.0 <=0.4 10e3/uL    Absolute NRBCs 0.0 10e3/uL     CT Abdomen Pelvis w Contrast   Final Result   IMPRESSION:    No evidence of acute abnormality.      Nonacute findings as described above are similar in appearance   compared to prior study.      This facility minimizes radiation dose by adjusting the mA and/or kV   according to each patient size.      This CT scan was performed using one or more the following dose   reduction techniques:      -Automated exposure control,   -Adjustment of the mA and/or kV according to patient's size, and/or,   -Use of iterative reconstruction  technique.      ZOILA MARTINEZ MD            SYSTEM ID:  [id number]              IWinston PA-C, personally performed the services described in this documentation, and it is both accurate and complete.     Vincenzo Ramon PA-C  05/27/23 1317

## 2023-06-17 ENCOUNTER — NURSE TRIAGE (OUTPATIENT)
Dept: NURSING | Facility: CLINIC | Age: 48
End: 2023-06-17
Payer: COMMERCIAL

## 2023-06-17 ENCOUNTER — HOSPITAL ENCOUNTER (EMERGENCY)
Facility: OTHER | Age: 48
Discharge: HOME | End: 2023-06-17
Attending: EMERGENCY MEDICINE | Admitting: EMERGENCY MEDICINE
Payer: COMMERCIAL

## 2023-06-17 VITALS
HEART RATE: 81 BPM | WEIGHT: 240 LBS | TEMPERATURE: 97.5 F | RESPIRATION RATE: 17 BRPM | OXYGEN SATURATION: 100 % | HEIGHT: 68 IN | BODY MASS INDEX: 36.37 KG/M2 | DIASTOLIC BLOOD PRESSURE: 90 MMHG | SYSTOLIC BLOOD PRESSURE: 132 MMHG

## 2023-06-17 DIAGNOSIS — K64.5 THROMBOSED EXTERNAL HEMORRHOIDS: ICD-10-CM

## 2023-06-17 PROCEDURE — 46083 INC THROMBOSED HROID XTRNL: CPT | Performed by: EMERGENCY MEDICINE

## 2023-06-17 PROCEDURE — 99283 EMERGENCY DEPT VISIT LOW MDM: CPT | Mod: 25 | Performed by: EMERGENCY MEDICINE

## 2023-06-17 PROCEDURE — 99283 EMERGENCY DEPT VISIT LOW MDM: CPT | Performed by: EMERGENCY MEDICINE

## 2023-06-17 PROCEDURE — 250N000009 HC RX 250: Performed by: EMERGENCY MEDICINE

## 2023-06-17 PROCEDURE — 99207 PR NO CHARGE LOS: CPT | Performed by: EMERGENCY MEDICINE

## 2023-06-17 PROCEDURE — 250N000013 HC RX MED GY IP 250 OP 250 PS 637: Performed by: EMERGENCY MEDICINE

## 2023-06-17 RX ORDER — LORAZEPAM 0.5 MG/1
0.5 TABLET ORAL ONCE
Status: COMPLETED | OUTPATIENT
Start: 2023-06-17 | End: 2023-06-17

## 2023-06-17 RX ORDER — LIDOCAINE HYDROCHLORIDE 10 MG/ML
10 INJECTION, SOLUTION INFILTRATION; PERINEURAL ONCE
Status: COMPLETED | OUTPATIENT
Start: 2023-06-17 | End: 2023-06-17

## 2023-06-17 RX ADMIN — IBUPROFEN 600 MG: 200 TABLET, FILM COATED ORAL at 23:01

## 2023-06-17 RX ADMIN — LORAZEPAM 0.5 MG: 0.5 TABLET ORAL at 23:01

## 2023-06-17 RX ADMIN — LIDOCAINE HYDROCHLORIDE 10 ML: 10 INJECTION, SOLUTION INFILTRATION; PERINEURAL at 23:01

## 2023-06-17 ASSESSMENT — ACTIVITIES OF DAILY LIVING (ADL): ADLS_ACUITY_SCORE: 35

## 2023-06-17 NOTE — ED TRIAGE NOTES
"Patient presents to the ED with spouse for evaluation of possible hemorrhoid. Pt reports rectal pain after having bm. States \"It hurt to wipe and when I touched it.\" Denies blood in stool or urine. Hx hemorrhoids, wife states \"it looks like when he had a hemorrhoid.      Triage Assessment     Row Name 06/17/23 9124       Triage Assessment (Adult)    Airway WDL WDL       Respiratory WDL    Respiratory WDL WDL       Skin Circulation/Temperature WDL    Skin Circulation/Temperature WDL X       Cardiac WDL    Cardiac WDL WDL       Peripheral/Neurovascular WDL    Peripheral Neurovascular WDL WDL       Cognitive/Neuro/Behavioral WDL    Cognitive/Neuro/Behavioral WDL WDL              "

## 2023-06-17 NOTE — TELEPHONE ENCOUNTER
Wondering if he has hemorrhoids.  Severe pain when he wipes and he feels a lump the size of an olive.  Rated his pain 9/10 when he wipes.    I recommended he be seen within 4 hours per the protocol.  He'll go to the ER in [place].    Reason for Disposition    SEVERE rectal pain (e.g., excruciating, unable to have a bowel movement)    Additional Information    Negative: Foreign body in rectum    Negative: Diarrhea is main symptom    Negative: Constipation is main symptom (e.g., pain or discomfort caused by passage of hard BMs)    Negative: Blood in or on bowel movement is main symptom    Negative: Pregnant    Negative: Injury to rectum    Negative: Large mass protruding out of rectum    Negative: Patient sounds very sick or weak to the triager    Protocols used: RECTAL SYMPTOMS-KINGA-RADHA BALDWIN RN [facility]

## 2023-06-18 NOTE — DISCHARGE INSTRUCTIONS
Ibuprofen 600 mg every 6 hours as needed for pain with food and plenty of water. Discontinue use after 5 days.   Make sure you are drinking plenty of water.  Most adults need 1 to 2 L/day.  Urine should be light/clear  Continue stool softeners.  You can take off the gauze in the morning when you wake up. You can replace this if you have some ongoing bleeding. You can expect light bleeding for 1-2 days  Wash with water after every time you have a bowel movement. You can do this with a shower head or sitz bath (available at the pharmacy), or soak in a warm shallow bath for a few minutes. Avoid aggressive wiping.  Follow up with primary care this week.   Seek emergency care for worsening pain, fever, severe bleeding, or if you have any new or changing symptoms/concerns.

## 2023-06-18 NOTE — ED PROVIDER NOTES
History     Chief Complaint   Patient presents with     Hemorrhoids     HPI  Tenzin Ca is a 47 year old adult who presents with concern for hemorrhoid. Pain with wiping and small amount of blood when he wipes. Wife looked at it and thinks it is a hemorrhoid. Pain started today. Does not hurn when has bowel movement.  Didn't take anything for pain today. Has had hemorrhoids in the past. Takes stool softener.     Allergies:  Allergies   Allergen Reactions     Tylenol [Acetaminophen]        Problem List:    Patient Active Problem List    Diagnosis Date Noted     Blood per rectum 04/20/2022     Priority: Medium     Chronic bilateral low back pain without sciatica 02/08/2022     Priority: Medium     Numbness 11/22/2021     Priority: Medium     S/P cervical spinal fusion 10/14/2021     Priority: Medium     Fusion of spine of cervical region 09/20/2021     Priority: Medium     Myelopathy concurrent with and due to spinal stenosis of cervical region (H) 09/06/2021     Priority: Medium     Paranoid schizophrenia (H) 09/04/2021     Priority: Medium     Sebaceous cyst 05/26/2021     Priority: Medium     Skin nodule 05/26/2021     Priority: Medium     Morbid obesity (H) 05/12/2021     Priority: Medium     Non-intractable vomiting with nausea 05/12/2021     Priority: Medium     Vitamin D deficiency 12/26/2018     Priority: Medium     Slow transit constipation 02/22/2018     Priority: Medium     Hypertriglyceridemia 02/20/2018     Priority: Medium     Major depressive disorder, recurrent episode (H) 02/20/2018     Priority: Medium     Psoriasis 02/20/2018     Priority: Medium     Schizophrenia (H) 02/20/2018     Priority: Medium     Overview:   hears voices       Torn medial meniscus 02/20/2018     Priority: Medium     Obesity 02/21/2017     Priority: Medium     S/P left knee arthroscopy 07/05/2016     Priority: Medium     Psychosis (H) 07/02/2015     Priority: Medium     Erectile dysfunction 04/23/2014     Priority: Medium      Delayed ejaculation 06/03/2013     Priority: Medium        Past Medical History:    Past Medical History:   Diagnosis Date     Mental disorder      Personal history of other mental and behavioral disorders        Past Surgical History:    Past Surgical History:   Procedure Laterality Date     ARTHROSCOPY KNEE      Left Knee Arthroscopy and Partial Meniscectomy and Chondroplasty     COLONOSCOPY N/A 6/16/2022    Procedure: COLONOSCOPY;  Surgeon: Frank Toro MD;  Location:  OR       Family History:    Family History   Problem Relation Age of Onset     Substance Abuse Mother         Alcohol/Drug     Alcoholism Mother         Alcoholism     Substance Abuse Brother         Alcohol/Drug     Alcoholism Brother         Alcoholism     Substance Abuse Sister         Alcohol/Drug     Alcoholism Sister         Alcoholism       Social History:  Marital Status:  Single [1]  Social History     Tobacco Use     Smoking status: Never     Passive exposure: Yes     Smokeless tobacco: Never   Vaping Use     Vaping Use: Never used   Substance Use Topics     Alcohol use: No     Alcohol/week: 0.0 standard drinks of alcohol     Drug use: No        Medications:    ALPRAZolam (XANAX) 0.5 MG tablet  ARIPiprazole (ABILIFY) 30 MG tablet  ARIPiprazole (ABILIFY) 5 MG tablet  clonazePAM (KLONOPIN) 1 MG tablet  lidocaine (LIDODERM) 5 % patch  ondansetron (ZOFRAN ODT) 4 MG ODT tab  OXcarbazepine (TRILEPTAL) 300 MG tablet  OXcarbazepine (TRILEPTAL) 600 MG tablet  polyethylene glycol (MIRALAX) 17 GM/Dose powder  prazosin (MINIPRESS) 1 MG capsule  predniSONE (DELTASONE) 20 MG tablet  senna-docusate (STOOL SOFTENER/LAXATIVE) 8.6-50 MG tablet  sildenafil (REVATIO) 20 MG tablet  tadalafil (CIALIS) 20 MG tablet  zolpidem (AMBIEN) 10 MG tablet          Review of Systems  Please seen HPI for pertinent positives and negatives. All other systems reviewed and found to be negative.   Physical Exam   BP: (!) 132/90  Pulse: 81  Temp: 97.5  F (36.4  " C)  Resp: 17  Height: 172.7 cm (5' 8\")  Weight: 108.9 kg (240 lb)  SpO2: 100 %      Physical Exam  Constitutional:       General: He is in acute distress.      Appearance: He is not ill-appearing.   HENT:      Head: Normocephalic and atraumatic.      Nose: Nose normal.      Mouth/Throat:      Mouth: Mucous membranes are moist.      Pharynx: Oropharynx is clear.   Eyes:      Conjunctiva/sclera: Conjunctivae normal.      Pupils: Pupils are equal, round, and reactive to light.   Cardiovascular:      Rate and Rhythm: Normal rate.   Pulmonary:      Effort: Pulmonary effort is normal.   Abdominal:      Palpations: Abdomen is soft.      Tenderness: There is no abdominal tenderness.   Genitourinary:     Comments: thrombosed external hemorrhoid at 10 o'clock position, tender, approximately 1 cm  Skin:     General: Skin is warm and dry.   Neurological:      Mental Status: He is alert and oriented to person, place, and time.         ED Course            [facility]    PROCEDURE: -Incision/Drainage    Date/Time: 6/22/2023 10:08 AM    Performed by: Mariposa Melendez MD  Authorized by: Mariposa Melendez MD    Risks, benefits and alternatives discussed.      LOCATION:      Type:  External thrombosed hemorrhoid    Size:  1 cm    Location:  Anogenital    Anogenital location:  Rectum    PRE-PROCEDURE DETAILS:     Skin preparation:  Betadine    PROCEDURE TYPE:     Complexity:  Simple    ANESTHESIA (see MAR for exact dosages):     Anesthesia method:  Local infiltration    Local anesthetic:  Lidocaine 1% w/o epi    PROCEDURE DETAILS:     Incision types:  Elliptical    Scalpel blade:  11    Wound management:  Probed and deloculated    Drainage characteristics: clot and trace blood.    Wound treatment:  Wound left open    PROCEDURE  Describe Procedure: Applied gauze pad and tape externally  Patient Tolerance:  Patient tolerated the procedure well with no immediate complications                Critical Care " time:  none               No results found for this or any previous visit (from the past 24 hour(s)).    Medications   ibuprofen (ADVIL/MOTRIN) tablet 600 mg (600 mg Oral $Given 6/17/23 2301)   LORazepam (ATIVAN) tablet 0.5 mg (0.5 mg Oral $Given 6/17/23 2301)   lidocaine 1 % injection 10 mL (10 mLs Infiltration $Given 6/17/23 2301)       Assessments & Plan (with Medical Decision Making)     I have reviewed the nursing notes.    I have reviewed the findings, diagnosis, plan and need for follow up with the patient.   Mr Ca is a 46 yo man who presents with rectal pain. Exam showed thrombosed external hemorrhoid.  This was drained easily and patient tolerated procedure well. Will keep gauze in place until morning or next bowel movement. Discussed continuing stool softeners, good water intake, cleaning with water or sitz bath after every bowel movement. Can expect slight bleeding over next few days. To follow up with  this week. Return precautions discussed as detailed in AVS. Patient expressed understanding.         Medical Decision Making  The patient's presentation was of low complexity (an acute and uncomplicated illness or injury).    The patient's evaluation involved:  history and exam without other MDM data elements    The patient's management necessitated moderate risk (a decision regarding minor procedure (incision & drainage) with risk factors of none).        Discharge Medication List as of 6/17/2023 11:34 PM          Final diagnoses:   Thrombosed external hemorrhoids       6/17/2023   [facility]     Mariposa Melendez MD  06/22/23 [id number]

## 2023-07-07 NOTE — NURSING NOTE
"Chief Complaint   Patient presents with     Headache     sore throat, SOB, diarrhea     Would like to be tested for Covid.    Initial /60   Pulse 78   Temp 98.1  F (36.7  C) (Tympanic)   Resp 20   Ht 1.727 m (5' 8\")   Wt 119.8 kg (264 lb 3.2 oz)   SpO2 97%   Breastfeeding No   BMI 40.17 kg/m   Estimated body mass index is 40.17 kg/m  as calculated from the following:    Height as of this encounter: 1.727 m (5' 8\").    Weight as of this encounter: 119.8 kg (264 lb 3.2 oz).         Norma J. Gosselin, MIGUEL   " What Type Of Note Output Would You Prefer (Optional)?: Standard Output Hpi Title: Evaluation of Skin Lesions

## 2023-09-29 ENCOUNTER — OFFICE VISIT (OUTPATIENT)
Dept: FAMILY MEDICINE | Facility: OTHER | Age: 48
End: 2023-09-29
Payer: COMMERCIAL

## 2023-09-29 VITALS
DIASTOLIC BLOOD PRESSURE: 72 MMHG | RESPIRATION RATE: 16 BRPM | HEIGHT: 69 IN | SYSTOLIC BLOOD PRESSURE: 138 MMHG | OXYGEN SATURATION: 97 % | TEMPERATURE: 97.2 F | HEART RATE: 75 BPM | WEIGHT: 237.7 LBS | BODY MASS INDEX: 35.21 KG/M2

## 2023-09-29 DIAGNOSIS — J06.9 VIRAL URI WITH COUGH: Primary | ICD-10-CM

## 2023-09-29 DIAGNOSIS — J02.9 SORE THROAT: ICD-10-CM

## 2023-09-29 LAB
GROUP A STREP BY PCR: NOT DETECTED
SARS-COV-2 RNA RESP QL NAA+PROBE: NEGATIVE

## 2023-09-29 PROCEDURE — 87635 SARS-COV-2 COVID-19 AMP PRB: CPT | Mod: ZL | Performed by: NURSE PRACTITIONER

## 2023-09-29 PROCEDURE — 99213 OFFICE O/P EST LOW 20 MIN: CPT | Performed by: NURSE PRACTITIONER

## 2023-09-29 PROCEDURE — C9803 HOPD COVID-19 SPEC COLLECT: HCPCS | Performed by: NURSE PRACTITIONER

## 2023-09-29 PROCEDURE — 87651 STREP A DNA AMP PROBE: CPT | Mod: ZL | Performed by: NURSE PRACTITIONER

## 2023-09-29 PROCEDURE — G0463 HOSPITAL OUTPT CLINIC VISIT: HCPCS

## 2023-09-29 RX ORDER — LINACLOTIDE 290 UG/1
1 CAPSULE, GELATIN COATED ORAL DAILY
COMMUNITY
Start: 2023-09-20

## 2023-09-29 RX ORDER — GABAPENTIN 100 MG/1
1 CAPSULE ORAL 3 TIMES DAILY
COMMUNITY
Start: 2023-09-18

## 2023-09-29 ASSESSMENT — PATIENT HEALTH QUESTIONNAIRE - PHQ9
SUM OF ALL RESPONSES TO PHQ QUESTIONS 1-9: 16
10. IF YOU CHECKED OFF ANY PROBLEMS, HOW DIFFICULT HAVE THESE PROBLEMS MADE IT FOR YOU TO DO YOUR WORK, TAKE CARE OF THINGS AT HOME, OR GET ALONG WITH OTHER PEOPLE: EXTREMELY DIFFICULT
SUM OF ALL RESPONSES TO PHQ QUESTIONS 1-9: 16

## 2023-09-29 ASSESSMENT — PAIN SCALES - GENERAL: PAINLEVEL: WORST PAIN (10)

## 2023-09-29 NOTE — PROGRESS NOTES
"No chief complaint on file.        Initial There were no vitals taken for this visit. Estimated body mass index is 36.49 kg/m  as calculated from the following:    Height as of 6/17/23: 1.727 m (5' 8\").    Weight as of 6/17/23: 108.9 kg (240 lb).       FOOD SECURITY SCREENING QUESTIONS:    The next two questions are to help us understand your food security.  If you are feeling you need any assistance in this area, we have resources available to support you today.    Hunger Vital Signs:  Within the past 12 months we worried whether our food would run out before we got money to buy more. Sometimes  Within the past 12 months the food we bought just didn't last and we didn't have money to get more. Often    Advance Care Directive on file? no      Medication reconciliation complete.      Kar Ac,on 9/29/2023 at 12:40 PM          "

## 2023-09-29 NOTE — PROGRESS NOTES
ASSESSMENT/PLAN:     I have reviewed the nursing notes.  I have reviewed the findings, diagnosis, plan and need for follow up with the patient.        1. Sore throat    - Group A Streptococcus PCR Throat Swab    2. Viral URI with cough    - Symptomatic COVID-19 Virus (Coronavirus) by PCR Nose      Negative Strep PCR test  Negative Covid PCR test    Discussed with patient that symptoms and exam are consistent with viral illness.    No clinical indications for antibiotic treatment at this time.    Symptomatic treatment - Encouraged fluids, salt water gargles, honey, elevation, humidifier, saline nasal spray, sinus rinse/netti pot, lozenges, tea, soup, smoothies, popsicles, topical vapor rub, rest, etc     May use over-the-counter Tylenol or ibuprofen PRN    Discussed warning signs/symptoms indicative of need to f/u  Follow up if symptoms persist or worsen or concerns      I explained my diagnostic considerations and recommendations to the patient, who voiced understanding and agreement with the treatment plan. All questions were answered. We discussed potential side effects of any prescribed or recommended therapies, as well as expectations for response to treatments.    Ayaka Baldwin NP  [facility]      SUBJECTIVE:   Tenzin Ca is a 48 year old adult who presents to clinic today for the following health issues:  Sore throat, cough    HPI  Symptoms started last night including cough, painful breathing, mild shortness of breath, sore throat, painful swallowing, runny nose, sneezing, headache, light headedness, dizziness, chills, nausea and fatigue.    No vomiting.  Taking Zofran.  Seeing specialist for GI issues next month.  No OTC medications         Past Medical History:   Diagnosis Date    Mental disorder     Disability related to mental illness    Personal history of other mental and behavioral disorders     2016     Past Surgical History:   Procedure Laterality Date    ARTHROSCOPY KNEE  "     Left Knee Arthroscopy and Partial Meniscectomy and Chondroplasty    COLONOSCOPY N/A 6/16/2022    Procedure: COLONOSCOPY;  Surgeon: Frank Toro MD;  Location:  OR     Social History     Tobacco Use    Smoking status: Never     Passive exposure: Yes    Smokeless tobacco: Never   Substance Use Topics    Alcohol use: No     Alcohol/week: 0.0 standard drinks of alcohol     Current Outpatient Medications   Medication Sig Dispense Refill    gabapentin (NEURONTIN) 100 MG capsule Take 1 capsule by mouth 3 times daily      LINZESS 290 MCG capsule Take 1 capsule by mouth daily      omeprazole (PRILOSEC) 20 MG DR capsule TAKE 1 CAPSULE BY MOUTH ONETIME A DAY. TAKE BEFORE MEALS. DO NOT CRUSH.      OXcarbazepine (TRILEPTAL) 300 MG tablet Take 300 mg by mouth every morning      OXcarbazepine (TRILEPTAL) 600 MG tablet Take 600 mg by mouth At Bedtime      zolpidem (AMBIEN) 10 MG tablet Take 10 mg by mouth At Bedtime      ALPRAZolam (XANAX) 0.5 MG tablet TAKE 1/2 TO 1 TAB BY MOUTH PRIOR TO AN EVENT FOR SEVERE ANXIETY. TAKE A 2ND TAB IF NEEDED APPROXIMATELY 40 MINUTES AFTER 1ST DOSE.      clonazePAM (KLONOPIN) 1 MG tablet TAKE 1 TABLET BY MOUTH 3 TIMES DAILY AS NEEDED FOR SEVERE ANXIETY AND VOICES      ondansetron (ZOFRAN ODT) 4 MG ODT tab Take 1 tablet (4 mg) by mouth every 8 hours as needed for nausea 25 tablet 0    prazosin (MINIPRESS) 1 MG capsule Take 1 capsule by mouth nightly as needed       Allergies   Allergen Reactions    Tylenol [Acetaminophen]          Past medical history, past surgical history, current medications and allergies reviewed and accurate to the best of my knowledge.        OBJECTIVE:     /72 (BP Location: Right arm, Patient Position: Sitting, Cuff Size: Adult Regular)   Pulse 75   Temp 97.2  F (36.2  C) (Tympanic)   Resp 16   Ht 1.753 m (5' 9\")   Wt 107.8 kg (237 lb 11.2 oz)   SpO2 97%   BMI 35.10 kg/m    Body mass index is 35.1 kg/m .      Physical Exam  General Appearance: Well " appearing adult male, appropriate appearance for age. No acute distress   Orophayrnx: moist mucous membranes, pharynx without erythema, tonsils without hypertrophy, tonsils without erythema, no tonsillar exudates, no oral lesions, no palate petechiae, clear post nasal drip seen, no trismus, voice clear.    Nose:  clear drainage and congestion   Neck: supple without adenopathy  Respiratory: normal chest wall and respirations.  Normal effort.  Clear to auscultation bilaterally, no wheezing, crackles or rhonchi.  No increased work of breathing.  No cough appreciated.  Cardiac: RRR with no murmurs  Musculoskeletal:  Equal movement of bilateral upper extremities.  Equal movement of bilateral lower extremities.  Normal gait.    Psychological: normal affect, alert, oriented, and pleasant.       Labs:  Results for orders placed or performed in visit on 09/29/23   Symptomatic COVID-19 Virus (Coronavirus) by PCR Nose     Status: Normal    Specimen: Nose; Swab   Result Value Ref Range    SARS CoV2 PCR Negative Negative    Narrative    Testing was performed using the Xpert Xpress SARS-CoV-2 Assay on the Cepheid Gene-Xpert Instrument Systems. Additional information about this Emergency Use Authorization (EUA) assay can be found via the Lab Guide. This test should be ordered for the detection of SARS-CoV-2 in individuals who meet SARS-CoV-2 clinical and/or epidemiological criteria as well as from individuals without symptoms or other reasons to suspect COVID-19. Test performance for asymptomatic patients has only been established in anterior nasal swab specimens. This test is for in vitro diagnostic use under the FDA EUA for laboratories certified under CLIA to perform high complexity testing. This test has not been FDA cleared or approved. A negative result does not rule out the presence of PCR inhibitors in the specimen or target RNA concentration below the limit of detection for the assay. The possibility of a false negative  should be considered if the patient's recent exposure or clinical presentation suggests COVID-19. This test was validated by [facility]. This laboratory is certified under the Clinical Laboratory Improvement Amendments (CLIA) as qualified to perform high complexity clinical laboratory testing.   Group A Streptococcus PCR Throat Swab     Status: Normal    Specimen: Throat; Swab   Result Value Ref Range    Group A strep by PCR Not Detected Not Detected    Narrative    The Xpert Xpress Strep A test, performed on the [employer], is a rapid, qualitative in vitro diagnostic test for the detection of Streptococcus pyogenes (Group A ß-hemolytic Streptococcus, Strep A) in throat swab specimens from patients with signs and symptoms of pharyngitis. The Xpert Xpress Strep A test can be used as an aid in the diagnosis of Group A Streptococcal pharyngitis. The assay is not intended to monitor treatment for Group A Streptococcus infections. The Xpert Xpress Strep A test utilizes an automated real-time polymerase chain reaction (PCR) to detect Streptococcus pyogenes DNA.

## 2023-09-30 ENCOUNTER — TELEPHONE (OUTPATIENT)
Dept: FAMILY MEDICINE | Facility: OTHER | Age: 48
End: 2023-09-30
Payer: COMMERCIAL

## 2023-09-30 NOTE — TELEPHONE ENCOUNTER
Sent copy of negative covid results to address on file, per patient request.  Evelia Soni LPN LPN....................  9/30/2023   3:01 PM

## 2023-10-21 ENCOUNTER — HOSPITAL ENCOUNTER (EMERGENCY)
Facility: OTHER | Age: 48
Discharge: PSYCHIATRIC OR SUBSTANCE USE FACILITY | End: 2023-10-22
Attending: STUDENT IN AN ORGANIZED HEALTH CARE EDUCATION/TRAINING PROGRAM | Admitting: STUDENT IN AN ORGANIZED HEALTH CARE EDUCATION/TRAINING PROGRAM
Payer: COMMERCIAL

## 2023-10-21 DIAGNOSIS — R45.850 HOMICIDAL IDEATION: ICD-10-CM

## 2023-10-21 PROCEDURE — 99285 EMERGENCY DEPT VISIT HI MDM: CPT | Performed by: STUDENT IN AN ORGANIZED HEALTH CARE EDUCATION/TRAINING PROGRAM

## 2023-10-21 PROCEDURE — 250N000013 HC RX MED GY IP 250 OP 250 PS 637: Performed by: STUDENT IN AN ORGANIZED HEALTH CARE EDUCATION/TRAINING PROGRAM

## 2023-10-21 RX ORDER — CLONAZEPAM 0.5 MG/1
1 TABLET ORAL ONCE
Status: COMPLETED | OUTPATIENT
Start: 2023-10-21 | End: 2023-10-21

## 2023-10-21 RX ADMIN — CLONAZEPAM 1 MG: 0.5 TABLET ORAL at 23:31

## 2023-10-22 ENCOUNTER — TELEPHONE (OUTPATIENT)
Dept: BEHAVIORAL HEALTH | Facility: CLINIC | Age: 48
End: 2023-10-22

## 2023-10-22 ENCOUNTER — TELEPHONE (OUTPATIENT)
Dept: BEHAVIORAL HEALTH | Facility: CLINIC | Age: 48
End: 2023-10-22
Payer: COMMERCIAL

## 2023-10-22 VITALS
HEART RATE: 72 BPM | RESPIRATION RATE: 18 BRPM | SYSTOLIC BLOOD PRESSURE: 136 MMHG | TEMPERATURE: 98.1 F | DIASTOLIC BLOOD PRESSURE: 71 MMHG | OXYGEN SATURATION: 97 %

## 2023-10-22 LAB
AMPHETAMINES UR QL SCN: ABNORMAL
ANION GAP SERPL CALCULATED.3IONS-SCNC: 10 MMOL/L (ref 7–15)
BARBITURATES UR QL SCN: ABNORMAL
BASO+EOS+MONOS # BLD AUTO: NORMAL 10*3/UL
BASO+EOS+MONOS NFR BLD AUTO: NORMAL %
BASOPHILS # BLD AUTO: 0 10E3/UL (ref 0–0.2)
BASOPHILS NFR BLD AUTO: 0 %
BENZODIAZ UR QL SCN: ABNORMAL
BUN SERPL-MCNC: 6.4 MG/DL (ref 6–20)
BZE UR QL SCN: ABNORMAL
CALCIUM SERPL-MCNC: 9.2 MG/DL (ref 8.6–10)
CANNABINOIDS UR QL SCN: ABNORMAL
CHLORIDE SERPL-SCNC: 105 MMOL/L (ref 98–107)
CREAT SERPL-MCNC: 0.98 MG/DL (ref 0.51–1.17)
DEPRECATED HCO3 PLAS-SCNC: 24 MMOL/L (ref 22–29)
EGFRCR SERPLBLD CKD-EPI 2021: >90 ML/MIN/1.73M2
EOSINOPHIL # BLD AUTO: 0.1 10E3/UL (ref 0–0.7)
EOSINOPHIL NFR BLD AUTO: 3 %
ERYTHROCYTE [DISTWIDTH] IN BLOOD BY AUTOMATED COUNT: 11.9 % (ref 10–15)
ETHANOL SERPL-MCNC: <0.01 G/DL
FENTANYL UR QL: ABNORMAL
GLUCOSE SERPL-MCNC: 120 MG/DL (ref 70–99)
HCT VFR BLD AUTO: 38.5 % (ref 35–53)
HGB BLD-MCNC: 13.6 G/DL (ref 11.7–17.7)
HOLD SPECIMEN: NORMAL
IMM GRANULOCYTES # BLD: 0 10E3/UL
IMM GRANULOCYTES NFR BLD: 1 %
LYMPHOCYTES # BLD AUTO: 1.9 10E3/UL (ref 0.8–5.3)
LYMPHOCYTES NFR BLD AUTO: 33 %
MCH RBC QN AUTO: 30.3 PG (ref 26.5–33)
MCHC RBC AUTO-ENTMCNC: 35.3 G/DL (ref 31.5–36.5)
MCV RBC AUTO: 86 FL (ref 78–100)
MONOCYTES # BLD AUTO: 0.4 10E3/UL (ref 0–1.3)
MONOCYTES NFR BLD AUTO: 8 %
NEUTROPHILS # BLD AUTO: 3.2 10E3/UL (ref 1.6–8.3)
NEUTROPHILS NFR BLD AUTO: 55 %
NRBC # BLD AUTO: 0 10E3/UL
NRBC BLD AUTO-RTO: 0 /100
OPIATES UR QL SCN: ABNORMAL
PCP QUAL URINE (ROCHE): ABNORMAL
PLATELET # BLD AUTO: 186 10E3/UL (ref 150–450)
POTASSIUM SERPL-SCNC: 3.9 MMOL/L (ref 3.4–5.3)
RBC # BLD AUTO: 4.49 10E6/UL (ref 3.8–5.9)
SARS-COV-2 RNA RESP QL NAA+PROBE: NEGATIVE
SODIUM SERPL-SCNC: 139 MMOL/L (ref 135–145)
WBC # BLD AUTO: 5.7 10E3/UL (ref 4–11)

## 2023-10-22 PROCEDURE — 36415 COLL VENOUS BLD VENIPUNCTURE: CPT | Performed by: STUDENT IN AN ORGANIZED HEALTH CARE EDUCATION/TRAINING PROGRAM

## 2023-10-22 PROCEDURE — 250N000011 HC RX IP 250 OP 636: Performed by: FAMILY MEDICINE

## 2023-10-22 PROCEDURE — 80307 DRUG TEST PRSMV CHEM ANLYZR: CPT | Performed by: STUDENT IN AN ORGANIZED HEALTH CARE EDUCATION/TRAINING PROGRAM

## 2023-10-22 PROCEDURE — C9803 HOPD COVID-19 SPEC COLLECT: HCPCS | Performed by: STUDENT IN AN ORGANIZED HEALTH CARE EDUCATION/TRAINING PROGRAM

## 2023-10-22 PROCEDURE — 80048 BASIC METABOLIC PNL TOTAL CA: CPT | Performed by: STUDENT IN AN ORGANIZED HEALTH CARE EDUCATION/TRAINING PROGRAM

## 2023-10-22 PROCEDURE — 85025 COMPLETE CBC W/AUTO DIFF WBC: CPT | Performed by: STUDENT IN AN ORGANIZED HEALTH CARE EDUCATION/TRAINING PROGRAM

## 2023-10-22 PROCEDURE — 82077 ASSAY SPEC XCP UR&BREATH IA: CPT | Performed by: STUDENT IN AN ORGANIZED HEALTH CARE EDUCATION/TRAINING PROGRAM

## 2023-10-22 PROCEDURE — 250N000013 HC RX MED GY IP 250 OP 250 PS 637: Performed by: FAMILY MEDICINE

## 2023-10-22 PROCEDURE — 87635 SARS-COV-2 COVID-19 AMP PRB: CPT | Performed by: STUDENT IN AN ORGANIZED HEALTH CARE EDUCATION/TRAINING PROGRAM

## 2023-10-22 RX ORDER — HYDROXYZINE PAMOATE 25 MG/1
25 CAPSULE ORAL EVERY 6 HOURS PRN
Status: DISCONTINUED | OUTPATIENT
Start: 2023-10-22 | End: 2023-10-22 | Stop reason: HOSPADM

## 2023-10-22 RX ORDER — CLONAZEPAM 0.5 MG/1
1 TABLET ORAL 3 TIMES DAILY PRN
Status: DISCONTINUED | OUTPATIENT
Start: 2023-10-22 | End: 2023-10-22 | Stop reason: HOSPADM

## 2023-10-22 RX ORDER — ONDANSETRON 4 MG/1
4 TABLET, ORALLY DISINTEGRATING ORAL ONCE
Status: COMPLETED | OUTPATIENT
Start: 2023-10-22 | End: 2023-10-22

## 2023-10-22 RX ORDER — HYDROXYZINE HYDROCHLORIDE 25 MG/1
25 TABLET, FILM COATED ORAL EVERY 6 HOURS PRN
Status: DISCONTINUED | OUTPATIENT
Start: 2023-10-22 | End: 2023-10-22

## 2023-10-22 RX ORDER — GABAPENTIN 100 MG/1
100 CAPSULE ORAL 3 TIMES DAILY
Status: DISCONTINUED | OUTPATIENT
Start: 2023-10-22 | End: 2023-10-22 | Stop reason: HOSPADM

## 2023-10-22 RX ORDER — PANTOPRAZOLE SODIUM 40 MG/1
40 TABLET, DELAYED RELEASE ORAL
Status: DISCONTINUED | OUTPATIENT
Start: 2023-10-22 | End: 2023-10-22 | Stop reason: HOSPADM

## 2023-10-22 RX ORDER — OXCARBAZEPINE 300 MG/1
300 TABLET, FILM COATED ORAL EVERY MORNING
Status: DISCONTINUED | OUTPATIENT
Start: 2023-10-22 | End: 2023-10-22 | Stop reason: HOSPADM

## 2023-10-22 RX ORDER — OXCARBAZEPINE 300 MG/1
600 TABLET, FILM COATED ORAL AT BEDTIME
Status: DISCONTINUED | OUTPATIENT
Start: 2023-10-22 | End: 2023-10-22 | Stop reason: HOSPADM

## 2023-10-22 RX ADMIN — ONDANSETRON 4 MG: 4 TABLET, ORALLY DISINTEGRATING ORAL at 19:24

## 2023-10-22 RX ADMIN — CLONAZEPAM 1 MG: 0.5 TABLET ORAL at 10:12

## 2023-10-22 RX ADMIN — OXCARBAZEPINE 300 MG: 300 TABLET, FILM COATED ORAL at 10:12

## 2023-10-22 RX ADMIN — CLONAZEPAM 1 MG: 0.5 TABLET ORAL at 19:24

## 2023-10-22 RX ADMIN — PANTOPRAZOLE SODIUM 40 MG: 40 TABLET, DELAYED RELEASE ORAL at 10:12

## 2023-10-22 ASSESSMENT — ACTIVITIES OF DAILY LIVING (ADL)
ADLS_ACUITY_SCORE: 35

## 2023-10-22 NOTE — PLAN OF CARE
Tenzin Ca  October 22, 2023  Plan of Care Hand-off Note     Patient Care Path: inpatient mental health    Plan for Care:   It is the recommendation of this clinician that pt admit to [facility] for safety and stabilization. Pt displays the following risk factors that support IP admission: homicidal ideation with a plan to kill 3 of his mental health providers. Pt does not think he can control the ideation and reports the ideation being very intense and stressful. He also has visual hallucinations and nightmares about killing them.  He reports auditory hallucinations of voices telling him to take a knife and cut their throats. Pt reports intense anger regarding these providers that started on 10/16/23 and he said he has not had these thoughts before. He reports taking medication as prescribed. He denies substance use. He denies SI and SIB.   Pt is unable to engage in safety planning to mitigate risk level in a non-secure setting. Lower levels of care have not been effective in mitigating risk. Due to this IP is the least restrictive option of care for pt. Pt should remain in IP until deemed safe to return to the community and engage in [facility] supports.    Identified Goals and Safety Issues: Pt needs to stabilize symptoms so he is safe in the community. He has homicidal ideation with a plan towards three of his community providers at [facility], ,   Ana Killian, PSS and  [place], therapist. He reports the HI is very difficult to control. He also said that if he hears their voices, he will be very angry and is having a high level of difficulty controlling the HI.  He is having auditory and visual hallucinations and nightmares about killing these providers. He is having paranoia and delusional thoughts. He is voluntarily seeking treatment.  He denies SI and SIB.  He has a history of one suicide attempt 5 years ago.    Overview:  Pt did not identify any contacts.       Dial  numbers for the patient - do not hand them a phone and walk away    Legal Status: Legal Status at Admission: Voluntary/Patient has signed consent for treatment    Psychiatry Consult: Yes       Updated  provider regarding plan of care.           PATIENCE Ho

## 2023-10-22 NOTE — ED NOTES
Pt is calm, resting on cot, talking with staff. He is given socks and a blanket.   Sitter is at bedside.

## 2023-10-22 NOTE — TELEPHONE ENCOUNTER
S: [place] , DEC  Radha  calling at 12:48AM about a 48 year old/Male presenting with HI     B: Pt arrived via  CRT . Presenting problem, stressors: Pt endorses HI towards his , therapist and PSS worker. Pt reports he has been having nightmares about slitting their throats and snapping their necks. Pt has been trying to distract himself and came in because he does not want to hurt anyone. Pt also experiencing AH and VH.     Pt affect in ED:  Full range; fast speech; polite  Pt Dx: Schizophrenia  Previous IPMH hx? Yes: [place] in 2015  Pt denies SI   Hx of suicide attempt? Yes: 5 years ago  Pt denies SIB  Pt endorses HI towards his , therapist and PSS worker, with plans and intent   Pt endorses auditory hallucinations  and endorses visual hallucination .   Pt RARS Score: 2    Hx of aggression/violence, sexual offenses, legal concerns, Epic care plan? describe: None reported or known  Current concerns for aggression this visit? No  Does pt have a history of Civil Commitment? No  Is Pt their own guardian? Yes    Pt is prescribed medication. Is patient medication compliant? Yes  Pt endorses OP services: Therapist, , [facility] Worker, and PSS worker  CD concerns: None  Acute or chronic medical concerns: None  Does Pt present with specific needs, assistive devices, or exclusionary criteria? None      Pt is ambulatory  Pt is able to perform ADLs independently      A: Pt to be reviewed for [facility] admission. Pt is Voluntary  Preferred placement: Statewide    COVID Symptoms: No  If yes, COVID test required   Utox: Not ordered, intake to request lab    CMP: Not ordered, intake requested lab  CBC: Not ordered, intake requested lab  HCG: N/A    R: Patient cleared and ready for behavioral bed placement: Yes  Pt placed on [facility] worklist? Yes    Does Patient need a Transfer Center request created? Yes, writer completed Transfer Center request at: 1AM    01:19 - Discussed w/ LIZBETH VARGAS who  reports based on screening questions and HI, pt would need to start in [facility] bed, which is not available - they only have shared rooms currently

## 2023-10-22 NOTE — TELEPHONE ENCOUNTER
R:  ELIZABETH/Cali    6:28 PM Noni Doe updated that Pt is accepted under [facility].  Please call #[phone] for nurse to nurse and to arrange transport.    Updated [facility] Worklist; and added to admit board.    6:32 PM Updated [facility] ED

## 2023-10-22 NOTE — CONSULTS
"Diagnostic Evaluation Consultation  Crisis Assessment    Patient Name: Tenzin Ca  Age:  48 year old  Legal Sex: male  Gender Identity: male  Pronouns:   Race: White  Ethnicity: Not  or   Language: English      Patient was assessed: Virtual: [employer] Crisis Assessment Start Time: 2345 Crisis Assessment Stop Time: 0028  Patient location: [facility] AND [facility]                             ED10    Referral Data and Chief Complaint  Tenzin Ca presents to the ED via EMS. Patient is presenting to the ED for the following concerns: Other (see comment) (homicidal ideation, auditory and visual hallucinations, anger/rage).   Factors that make the mental health crisis life threatening or complex are:  Pt called [facility] due to homicidal ideation towards 3 of his services providers. He states that he is having uncontrollable anger towards Deena WADDELL, ,  Ana Killian, PSS, and  Di Pena, therapist.  He is having thoughts of slitting their throat, drowning them, and breaking their neck. He called [facility] because he does not want to kill them and does not think he can control the homicidal ideation.  Pt reports these thoughts started on 10/16/23. He thinks that these providers are not helping him, interrupt him, and are not answering his questions. He reports interactions with all 3 of them, stating that he was \"cussing\" at them.  Furthermore, he said he is upset because he was told by Mony Forrester, case  that he cannot fire his workers.  He reports that he feels calm around his [employer] worker Marv SANTANA and his coordinator Gina Anderson. He reports having nightmares about these providers with thoughts of killing them. He stated \"these 3 individuals I just really want to mangle and kill them. My anger is unbearable. I just want to literally kill them. Even if I just look at them. I don't want to go to [facility] for that. That's why I called the crisis line. My thoughts, voices, " "hallucinations, and delusions.\"   He said \"voices telling me to take a knife and slash their throats. If I even see them, I don't care what happens to me, I will literally just kill them, all 3 of them. My anger is outrageous about all 3 of them. I just want to pop their heads off. The voices are overwhelming. I can see myself doing it. When I'm sleeping, I can see it in my dreams. When I wake up, I think I wish I could do it for reality. I don't want that to happen. That's why I need help. I need to control this before it gets out of hand. I can't even bare it. I try to hold it in, I had to call the crisis. That's why I am here.\" Pt stated his therapist is the \"first one on my list, they all are in the same category though.    If I even look at them or talk to them, I will get very defensive. I don't even want to see them because I don't want to act upon my thoughts. I need stay away from these 3 individuals.\"  He reports VH of \"me actually doing it. I see their heads\" and delusions-\" I feel like they attack me and I make my move\"  He reports he has never had these kind of thoughts before in his life. He said he is taking medication as prescribed. He denies SI and SIB. He reports problems sleeping, racing thoughts,  and low appetite..      Informed Consent and Assessment Methods  Explained the crisis assessment process, including applicable information disclosures and limits to confidentiality, assessed understanding of the process, and obtained consent to proceed with the assessment.  Assessment methods included conducting a formal interview with patient, review of medical records, collaboration with medical staff, and obtaining relevant collateral information from family and community providers when available.  : done     Patient response to interventions: acceptance expressed, verbalizes understanding  Coping skills were attempted to reduce the crisis:  Pt reports he tried listening to music to distract himself, " however this did not help.     History of the Crisis   Pt has historical diagnoses of schizophrenia, paranoid type. He reports one suicide attempt 5 years ago in July. Records note a hospitalization in 2015.  He is currently working with a , therapist, medication manager, and PSS at [facility].  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    Brief Psychosocial History  Family:  Single, Children no  Support System:  Other (specify) ([facility] worker and Coordinator)  Employment Status:  disabled  Source of Income:  social security  Financial Environmental Concerns:  none  Current Hobbies:  music  Barriers in Personal Life:  mental health concerns    Significant Clinical History  Current Anxiety Symptoms:  racing thoughts, anxious  Current Depression/Trauma:  irritable  Current Somatic Symptoms:  racing thoughts, anxious  Current Psychosis/Thought Disturbance:  impulsive, displaces blame, hostile/aggressive, anger, agitation, hyperverbal, auditory hallucinations, visual hallucinations  Current Eating Symptoms:  loss of appetite  Chemical Use History:  Alcohol: None  Benzodiazepines: None  Opiates: None  Cocaine: None  Marijuana: None  Other Use: None   Past diagnosis:  Schizophrenia, Anxiety Disorder, Depression, Suicide attempt(s)  Family history:  Substance Use Disorder  Past treatment:  Individual therapy, Case management, Primary Care, Psychiatric Medication Management, Inpatient Hospitalization, [facility], Other ()  Details of most recent treatment:  Pt is working with [facility] , therapist, psychiatrist, and PSS.  Other relevant history:          Collateral Information  No contacts were identified by patient.      Additional collateral information:   Placed call to the Crisis Response Team (CRT) at [phone]. Spoke with Kecia and provided clinical information. CRT will follow up upon pt discharging to  "the community by phone or in person, as appropriate. Faxed assessment to CRT at [phone]. Also spoke with Kecia regarding the duty to warn that this writer initiated and asked that she also follow up with [facility] 3 providers about this duty to warn.       Risk Assessment  [place] Suicide Severity Rating Scale Full Clinical Version: 10/22/23   Suicidal Ideation  Q1 Wish to be Dead (Lifetime): Yes  Q2 Non-Specific Active Suicidal Thoughts (Lifetime): Yes  3. Active Suicidal Ideation with any Methods (Not Plan) Without Intent to Act (Lifetime): Yes  Q4 Active Suicidal Ideation with Some Intent to Act, Without Specific Plan (Lifetime): Yes  Q5 Active Suicidal Ideation with Specific Plan and Intent (Lifetime): Yes  Q6 Suicide Behavior (Lifetime): yes     Suicidal Behavior (Lifetime)  Actual Attempt (Lifetime): Yes (\"5 years ago in July\")  Has subject engaged in non-suicidal self-injurious behavior? (Lifetime): No  Interrupted Attempts (Lifetime): No  Aborted or Self-Interrupted Attempt (Lifetime): No  Preparatory Acts or Behavior (Lifetime): No      Environmental or Psychosocial Events: challenging interpersonal relationships, other life stressors  Protective Factors: Protective Factors: help seeking, lives in a responsibly safe and stable environment, supportive ongoing medical and mental health care relationships, cultural, spiritual , or Adventism beliefs associated with meaning and value in life    Does the patient have thoughts of harming others? Feels Like Hurting Others: yes  Previous Attempt to Hurt Others: no  Current presentation:  (cooperative, polite, talkative and hyperverbal during DEC assessment)  Violence Threats in Past 6 Months: Yes  Current Violence Plan or Thoughts: Yes  Is the patient engaging in sexually inappropriate behavior?: no  Duty to warn initiated: yes  Duty to warn details: DEC  contacted [facility] and left a voicemail indicating " details of the HI regarding the 3 specific providers.  DEC  also contacted [facility] regarding this and faxed the full DEC assessment to [facility].   asked CRT worker Kecia if someone from [facility] can also follow up with [facility] regarding this duty to warn.    Is the patient engaging in sexually inappropriate behavior?  no        Mental Status Exam   Affect: Appropriate  Appearance: Appropriate (laying in bed)  Attention Span/Concentration: Attentive  Eye Contact: Variable    Fund of Knowledge: Appropriate   Language /Speech Content: Fluent  Language /Speech Volume: Normal  Language /Speech Rate/Productions: Pressured, Hyperverbal  Recent Memory: Intact  Remote Memory: Intact  Mood: Anxious, Depressed  Orientation to Person: Yes   Orientation to Place: Yes  Orientation to Time of Day: Yes  Orientation to Date: Yes     Situation (Do they understand why they are here?): Yes  Psychomotor Behavior: Normal  Thought Content: Delusions, Hallucinations, Homicidal, Paranoia  Thought Form: Paranoia, Obsessive/Perseverative       Medication  Psychotropic medications:   Medication Orders - Psychiatric (From admission, onward)      None             No current facility-administered medications for this encounter.     Current Outpatient Medications   Medication    ALPRAZolam (XANAX) 0.5 MG tablet    clonazePAM (KLONOPIN) 1 MG tablet    gabapentin (NEURONTIN) 100 MG capsule    LINZESS 290 MCG capsule    omeprazole (PRILOSEC) 20 MG DR capsule    ondansetron (ZOFRAN ODT) 4 MG ODT tab    OXcarbazepine (TRILEPTAL) 300 MG tablet    OXcarbazepine (TRILEPTAL) 600 MG tablet    prazosin (MINIPRESS) 1 MG capsule    zolpidem (AMBIEN) 10 MG tablet      Current Care Team  Patient Care Team:  Ervin Hassan MD as PCP - General (Family Practice)  Joes Wheeler MD as Assigned Surgical Provider  Micaela Okeefe PA-C as Assigned PCP  Di [facility]ena [facility] as Therapist  MARY Meyer as Specialty Provider  Deena  SOLEDAD as     Diagnosis  Patient Active Problem List   Diagnosis Code    Psychosis (H) F29    Delayed ejaculation F52.32    Erectile dysfunction N52.9    Hypertriglyceridemia E78.1    Major depressive disorder, recurrent episode (H24) F33.9    Obesity E66.9    Psoriasis L40.9    Schizophrenia (H) F20.9    Torn medial meniscus S83.249A    S/P left knee arthroscopy Z98.890    Slow transit constipation K59.01    Vitamin D deficiency E55.9    Morbid obesity (H) E66.01    Non-intractable vomiting with nausea R11.2    Sebaceous cyst L72.3    Skin nodule R22.9    Fusion of spine of cervical region M43.22    Numbness R20.0    Chronic bilateral low back pain without sciatica M54.50, G89.29    Blood per rectum K62.5    Myelopathy concurrent with and due to spinal stenosis of cervical region (H) M48.02, G99.2    S/P cervical spinal fusion Z98.1    Paranoid schizophrenia (H) F20.0       Primary Problem This Admission  Active Hospital Problems    *Paranoid schizophrenia (H)        Clinical Summary and Substantiation of Recommendations   It is the recommendation of this clinician that pt admit to IP  for safety and stabilization. Pt displays the following risk factors that support IP admission: homicidal ideation with a plan to kill 3 of his mental health providers. Pt does not think he can control the ideation and reports the ideation being very intense and stressful. He also has visual hallucinations and nightmares about killing them.  He reports auditory hallucinations of voices telling him to take a knife and cut their throats. Pt reports intense anger regarding these providers that started on 10/16/23 and he said he has not had these thoughts before. He reports taking medication as prescribed. He denies substance use. He denies SI and SIB.   Pt is unable to engage in safety planning to mitigate risk level in a non-secure setting. Lower levels of care have not been effective in mitigating risk. Due to this IP is the  least restrictive option of care for pt. Pt should remain in IP until deemed safe to return to the community and engage in [facility] supports.       Imminent risk of harm: Homicidal Thoughts or Behaviors  Severe psychiatric, behavioral or other comorbid conditions are appropriate for management at inpatient mental health as indicated by at least one of the following: Psychiatric Symptoms, Impaired impulse control, judgement, or insight     Situation and expectations are appropriate for inpatient care: Around-the-clock medical and nursing care to address symptoms and initiate intervention is required, Patient management/treatment at lower level of care is not feasible or is inappropriate  Inpatient mental health services are necessary to meet patient needs and at least one of the following: Specific condition related to admission diagnosis is present and judged likely to further improve at proposed level of care, Specific condition related to admission diagnosis is present and judged likely to deteriorate in absence of treatment at proposed level of care      Patient coping skills attempted to reduce the crisis:  Pt reports he tried listening to music to distract himself, however this did not help.    Disposition  Recommended disposition: Inpatient Mental Health        Reviewed case and recommendations with attending provider. Attending Name: Dr. Tru Chiang       Attending concurs with disposition: yes       Patient and/or validated legal guardian concurs with disposition:   yes       Final disposition:  inpatient mental health    Legal status on admission: Voluntary/Patient has signed consent for treatment    Assessment Details   Total duration spent with the patient: 43 min     CPT code(s) utilized: 95960 - Psychotherapy for Crisis - 60 (30-74*) min    PATIENCE Ho, Psychotherapist  DEC - Triage & Transition Services  Callback: [phone]

## 2023-10-22 NOTE — ED TRIAGE NOTES
"Pt brought in by [facility], pt called tonight due to having homicidal thoughts this past week, states wants to kill his \"3 providers\" a , therapist and med provider. Pt states these people are not helping him and not listening to him. He states he came in because he does not want to harm anybody but if he sees these people this week he will kill them. Pt cooperative and polite with staff here at this time.      Triage Assessment (Adult)       Row Name 10/21/23 7593          Triage Assessment    Airway WDL WDL        Respiratory WDL    Respiratory WDL WDL        Skin Circulation/Temperature WDL    Skin Circulation/Temperature WDL WDL        Cardiac WDL    Cardiac WDL WDL        Peripheral/Neurovascular WDL    Peripheral Neurovascular WDL WDL        Cognitive/Neuro/Behavioral WDL    Cognitive/Neuro/Behavioral WDL WDL                     "

## 2023-10-22 NOTE — ED NOTES
Pt was corporative through shift. Visited for the majority of the night with the sitter. Was able to rest and close his eyes around 0500

## 2023-10-22 NOTE — ED NOTES
IP MH Referral Acuity Rating Score (RARS)    LMHP complete at referral to IP MH, with DEC; and, daily while awaiting IP MH placement. Call score to PPS.  CRITERIA SCORING   New 73 HH and Involuntary for IP MH (not adolescent) 0/1   Boarding over 24 hours 0/1   Vulnerable adult at least 55+ with multiple co morbidities; or, Patient age 11 or under 0/1   Suicide ideation without relief of precipitating factors 0/1   Current plan for suicide 0/1   Current plan for homicide 1/1   Imminent risk or actual attempt to seriously harm another without relief of factors precipitating the attempt 1/1   Severe dysfunction in daily living (ex: complete neglect for self care, extreme disruption in vegetative function, extreme deterioration in social interactions) 0/1   Recent (last 2 weeks) or current physical aggression in the ED 0/1   Restraints or seclusion episode in ED 0/1   Verbal aggression, agitation, yelling, etc., while in the ED 0/1   Active psychosis with psychomotor agitation or catatonia 0/1   Need for constant or near constant redirection (from leaving, from others, etc).  0/1   Intrusive or disruptive behaviors 0/1   TOTAL Acuity Total Score: 2

## 2023-10-22 NOTE — ED PROVIDER NOTES
10/22/23   7:00 AM    I am accepting the care of this patient from Dr Chiang at change of shift.  Tenzin Ca is a 47 yo male with history of paranoid schizophrenia. He has thoughts of killing staff at [facility]. He says that if he sees these people he will kill them. Labs okay. Given 1 mg clonazepam tablet here. DEC recommends inpatient. He has been appropriate here.     ED Course    DEC did evaluate the patient today and they continue to recommend placement. I did order home meds.    6:18 PM  [facility] has accepted him if he is on a hold. I did order a 72 Hour Hold.       Diagnosis    (R45.850) Homicidal ideation      Plan: transfer           Billy Quispe MD  10/22/23 8083       Billy Quispe MD  10/22/23 5565

## 2023-10-22 NOTE — ED PROVIDER NOTES
History     Chief Complaint   Patient presents with    Homicidal       Tenzin SELAM Ca is a 48 year old adult presenting with homicidal ideation.  Onset this past week.  He has become very frustrated with his , therapist, and PSS representative.  They work at [facility].  He has thoughts of slitting their throats.  He is bothered by these thoughts.  Feels like these individuals are not helping him or listening to him.  Endorses ongoing visual and auditory hallucinations which he chronically has, he states as part of his paranoid schizophrenia diagnosis, but have been worsening.  Denies any recent medication changes.  Compliant with his medications.  Denies suicidal ideation.  Denies any fetal complaints including fever, chills, any pain, dyspnea.  No recent trauma.  Denies history of past homicidal ideation.    Allergies   Allergen Reactions    Tylenol [Acetaminophen]        Patient Active Problem List    Diagnosis Date Noted    Blood per rectum 04/20/2022     Priority: Medium    Chronic bilateral low back pain without sciatica 02/08/2022     Priority: Medium    Numbness 11/22/2021     Priority: Medium    S/P cervical spinal fusion 10/14/2021     Priority: Medium    Fusion of spine of cervical region 09/20/2021     Priority: Medium    Myelopathy concurrent with and due to spinal stenosis of cervical region (H) 09/06/2021     Priority: Medium    Paranoid schizophrenia (H) 09/04/2021     Priority: Medium    Sebaceous cyst 05/26/2021     Priority: Medium    Skin nodule 05/26/2021     Priority: Medium    Morbid obesity (H) 05/12/2021     Priority: Medium    Non-intractable vomiting with nausea 05/12/2021     Priority: Medium    Vitamin D deficiency 12/26/2018     Priority: Medium    Slow transit constipation 02/22/2018     Priority: Medium    Hypertriglyceridemia 02/20/2018     Priority: Medium    Major depressive disorder, recurrent episode (H24) 02/20/2018     Priority: Medium    Psoriasis 02/20/2018      Priority: Medium    Schizophrenia (H) 02/20/2018     Priority: Medium     Overview:   hears voices      Torn medial meniscus 02/20/2018     Priority: Medium    Obesity 02/21/2017     Priority: Medium    S/P left knee arthroscopy 07/05/2016     Priority: Medium    Psychosis (H) 07/02/2015     Priority: Medium    Erectile dysfunction 04/23/2014     Priority: Medium    Delayed ejaculation 06/03/2013     Priority: Medium       Past Medical History:   Diagnosis Date    Mental disorder     Personal history of other mental and behavioral disorders        Past Surgical History:   Procedure Laterality Date    ARTHROSCOPY KNEE      Left Knee Arthroscopy and Partial Meniscectomy and Chondroplasty    COLONOSCOPY N/A 6/16/2022    Procedure: COLONOSCOPY;  Surgeon: Frank Toro MD;  Location:  OR       Family History   Problem Relation Age of Onset    Substance Abuse Mother         Alcohol/Drug    Alcoholism Mother         Alcoholism    Substance Abuse Brother         Alcohol/Drug    Alcoholism Brother         Alcoholism    Substance Abuse Sister         Alcohol/Drug    Alcoholism Sister         Alcoholism       Social History     Tobacco Use    Smoking status: Never     Passive exposure: Yes    Smokeless tobacco: Never   Vaping Use    Vaping Use: Never used   Substance Use Topics    Alcohol use: No     Alcohol/week: 0.0 standard drinks of alcohol    Drug use: No       Medications:    ALPRAZolam (XANAX) 0.5 MG tablet  clonazePAM (KLONOPIN) 1 MG tablet  gabapentin (NEURONTIN) 100 MG capsule  LINZESS 290 MCG capsule  omeprazole (PRILOSEC) 20 MG DR capsule  ondansetron (ZOFRAN ODT) 4 MG ODT tab  OXcarbazepine (TRILEPTAL) 300 MG tablet  OXcarbazepine (TRILEPTAL) 600 MG tablet  prazosin (MINIPRESS) 1 MG capsule  zolpidem (AMBIEN) 10 MG tablet        Review of Systems: See HPI for pertinent negatives and positives. All other systems reviewed and found to be negative.    Physical Exam   BP (!) 143/95   Pulse 87   Temp 98.5  F  (36.9  C) (Tympanic)   Resp 19   SpO2 97%      General: awake, comfortable  HEENT: atraumatic  Respiratory: normal effort, clear to auscultation bilaterally  Cardiovascular: regular rate and rhythm, no murmurs  Abdomen: soft, nontender, nondistended  Extremities: no deformities, edema, or tenderness  Skin: warm, dry, no rashes  Neuro: alert and oriented, no focal deficits  Psych: Appropriate interaction    ED Course      ED Course as of 10/22/23 0416   Sun Oct 22, 2023   0032 DEC recommends IP placement.       Results for orders placed or performed during the hospital encounter of 10/21/23 (from the past 24 hour(s))   [place] Draw    Narrative    The following orders were created for panel order [place] Draw.  Procedure                               Abnormality         Status                     ---------                               -----------         ------                     Extra Red Top Tube[[id number]]                               Final result               Extra Green Top (Lithium...[[id number]]                      Final result               Extra Green Top (Lithium...[[id number]]                      Final result               Extra Purple Top Tube[[id number]]                            Final result                 Please view results for these tests on the individual orders.   Extra Red Top Tube   Result Value Ref Range    Hold Specimen JIC    Extra Green Top (Lithium Heparin) Tube   Result Value Ref Range    Hold Specimen JIC    Extra Green Top (Lithium Heparin) Tube   Result Value Ref Range    Hold Specimen JIC    Extra Purple Top Tube   Result Value Ref Range    Hold Specimen JIC    CBC with platelets differential    Narrative    The following orders were created for panel order CBC with platelets differential.  Procedure                               Abnormality         Status                     ---------                               -----------         ------                     CBC with platelets  "and d...[[id number]]                      Final result                 Please view results for these tests on the individual orders.   Basic metabolic panel   Result Value Ref Range    Sodium 139 135 - 145 mmol/L    Potassium 3.9 3.4 - 5.3 mmol/L    Chloride 105 98 - 107 mmol/L    Carbon Dioxide (CO2) 24 22 - 29 mmol/L    Anion Gap 10 7 - 15 mmol/L    Urea Nitrogen 6.4 6.0 - 20.0 mg/dL    Creatinine 0.98 0.51 - 1.17 mg/dL    GFR Estimate >90 >60 mL/min/1.73m2    Calcium 9.2 8.6 - 10.0 mg/dL    Glucose 120 (H) 70 - 99 mg/dL    Narrative    The generation of reference intervals for this test is currently based on binary male or female sex. If the electronic health record information indicates another gender identity or if Legal Sex is recorded as \"Unknown\", both male and female reference intervals are provided where applicable, and should be considered according to the individual's appropriate clinical context.   Ethanol GH   Result Value Ref Range    Alcohol ethyl <0.01 <=0.01 g/dL   CBC with platelets and differential   Result Value Ref Range    WBC Count 5.7 4.0 - 11.0 10e3/uL    RBC Count 4.49 3.80 - 5.90 10e6/uL    Hemoglobin 13.6 11.7 - 17.7 g/dL    Hematocrit 38.5 35.0 - 53.0 %    MCV 86 78 - 100 fL    MCH 30.3 26.5 - 33.0 pg    MCHC 35.3 31.5 - 36.5 g/dL    RDW 11.9 10.0 - 15.0 %    Platelet Count 186 150 - 450 10e3/uL    % Neutrophils 55 %    % Lymphocytes 33 %    % Monocytes 8 %    Mids % (Monos, Eos, Basos)      % Eosinophils 3 %    % Basophils 0 %    % Immature Granulocytes 1 %    NRBCs per 100 WBC 0 <1 /100    Absolute Neutrophils 3.2 1.6 - 8.3 10e3/uL    Absolute Lymphocytes 1.9 0.8 - 5.3 10e3/uL    Absolute Monocytes 0.4 0.0 - 1.3 10e3/uL    Mids Abs (Monos, Eos, Basos)      Absolute Eosinophils 0.1 0.0 - 0.7 10e3/uL    Absolute Basophils 0.0 0.0 - 0.2 10e3/uL    Absolute Immature Granulocytes 0.0 <=0.4 10e3/uL    Absolute NRBCs 0.0 10e3/uL    Narrative    The generation of reference intervals for this " "test is currently based on binary male or female sex. If the electronic health record information indicates another gender identity or if Legal Sex is recorded as \"Unknown\", both male and female reference intervals are provided where applicable, and should be considered according to the individual's appropriate clinical context.   Extra Tube ([place] Draw)    Narrative    The following orders were created for panel order Extra Tube ([place] Draw).  Procedure                               Abnormality         Status                     ---------                               -----------         ------                     Extra Green Top (Lithium...[[id number]]                      Final result                 Please view results for these tests on the individual orders.   Extra Green Top (Lithium Heparin) Tube   Result Value Ref Range    Hold Specimen JIC    Extra Tube    Narrative    The following orders were created for panel order Extra Tube.  Procedure                               Abnormality         Status                     ---------                               -----------         ------                     Extra Blue Top Tube[[id number]]                                                           Please view results for these tests on the individual orders.   Asymptomatic COVID-19 Virus (Coronavirus) by PCR Nose    Specimen: Nose; Swab   Result Value Ref Range    SARS CoV2 PCR Negative Negative    Narrative    Testing was performed using the Xpert Xpress SARS-CoV-2 Assay on the Cepheid Gene-Xpert Instrument Systems. Additional information about this Emergency Use Authorization (EUA) assay can be found via the Lab Guide. This test should be ordered for the detection of SARS-CoV-2 in individuals who meet SARS-CoV-2 clinical and/or epidemiological criteria as well as from individuals without symptoms or other reasons to suspect COVID-19. Test performance for asymptomatic patients has only been established in " anterior nasal swab specimens. This test is for in vitro diagnostic use under the FDA EUA for laboratories certified under CLIA to perform high complexity testing. This test has not been FDA cleared or approved. A negative result does not rule out the presence of PCR inhibitors in the specimen or target RNA concentration below the limit of detection for the assay. The possibility of a false negative should be considered if the patient's recent exposure or clinical presentation suggests COVID-19. This test was validated by [facility]. This laboratory is certified under the Clinical Laboratory Improvement Amendments (CLIA) as qualified to perform high complexity clinical laboratory testing.   Urine Drug Screen    Narrative    The following orders were created for panel order Urine Drug Screen.  Procedure                               Abnormality         Status                     ---------                               -----------         ------                     Urine Drug Screen Panel[[id number]]      Abnormal            Final result                 Please view results for these tests on the individual orders.   Urine Drug Screen Panel   Result Value Ref Range    Amphetamines Urine Screen Negative Screen Negative    Barbituates Urine Screen Negative Screen Negative    Benzodiazepine Urine Screen Positive (A) Screen Negative    Cannabinoids Urine Screen Negative Screen Negative    Cocaine Urine Screen Negative Screen Negative    Fentanyl Qual Urine Screen Negative Screen Negative    Opiates Urine Screen Negative Screen Negative    PCP Urine Screen Negative Screen Negative       Medications   clonazePAM (klonoPIN) tablet 1 mg (1 mg Oral $Given 10/21/23 3931)       Assessments & Plan (with Medical Decision Making)     I have reviewed the nursing notes.    48 year old male with history of paranoid schizophrenia evaluated for homicidal ideation over past week involving  thoughts of slitting the throats of 3 of his mental health caregivers that he is frustrated with. Brought in by CRT and patient is cooperative. History, exam, and evaluation do not reveal a medical abnormality requiring emergent treatment, and this homicidal ideation is likely of psychiatric etiology. Social work consultation via DEC recommends psychiatric assessment. In conjunction with  recommendation, plan to admit patient to an appropriate psychiatric care facility to assist in optimal evaluation and management. Awaiting mental health inpatient placement at time of sign out to Dr. Quispe. See DEC separate note for details.    New Prescriptions    No medications on file       Final diagnoses:   Homicidal ideation       10/21/2023   [facility] AND [facility]       Tru Chiang MD  10/22/23 0714

## 2023-10-22 NOTE — TELEPHONE ENCOUNTER
R: [place]  Access Inpatient Bed Call Log 10/22/23 @7:14 am:    Intake has called facilities that have not updated the bed status within the last 12 hours.                              Pt not appropriate for beds available.  [facility] is posting 0 beds.           [facility] is posting 0 beds. [phone].  per call at 7:15 am to Swapna, they are at cap.    is posting 0 beds. Negative covid required.                  [facility] is posting 0 beds. Negative covid required. [phone]; per call at 7:17 am to Estefania, they are at cap.    [facility] is posting 0 bed. [phone]    [facility] is posting 0 beds. [phone].  per call at 7:19 am to Marysol, they are at cap.   [facility] is posting 3 beds for Young Adults. Ages 18-26. Call for details. Negative covid.  [phone]; per call at 7:20 am, no answer so author left a vm asking for a call back re: bed avail. Per Micaela at 7:28 am, they have a few beds.   [facility] is posting 0 beds            [facility] ([facility]) is posting 0 beds.         [facility] is posting 4 bed. LOW acuity ONLY. Mixed unit 12+. Negative covid- [phone].      [facility] has 0 beds posted. No aggression. Negative Covid. Low acuity.       [facility] is posting 0 beds. Negative covid. [phone]    [facility] ([place] is posting 0 beds. Low acuity only. Negative covid.  [phone].     [facility] is posting 0 beds. Low acuity. No current aggression.      [facility] ([place]) is posting 0 bed available. Negative covid.  [phone].        [facility] is posting 1 bed. Low acuity. 72 HH hold preferred. Negative covid required. [phone].    [facility] ([facility] is posting 0 beds. Low acuity only. Negative covid.  [phone].     [facility] [place] is posting 4 beds.  Negative covid required.   Vol only, No history of  "aggression, violence, or assault. No sexual offenders. No 72 HH holds. [phone].    per call at 12:20 pm to Tracy, they can review pt. Author faxed clinical, labs and face sheet at 12:31 pm. Per Tracy at 1:51 pm, they decline pt due to his HI which is \"extreme.\"  [facility] is posting 8 beds. Negative covid required.  (Must have the cognitive ability to do programming. No aggressive or violent behavior or recent HX in the last 2 yrs. MH must be primary.) Always low acuity. [phone]    [facility] has 0 beds posted. Negative covid required.  Low acuity only. Violence and aggression capped.  [phone].   [facility] is posting 3 beds. Low acuity, Negative covid required. [phone]. Per call at 7:30 am to [facility], they are not screening patients today.    [facility] is posting 6 beds. Unit is a combined unit (14+). No aggressive patients. Voluntary only. Must be accompanied by a guardian.  Negative covid. [phone].  Can't accommodate suboxone or CD  tx; low acuity. Per call at 7:32 am to Lane, they have 3 beds avail.    [facility] posting 1 bed. Stepdown bed. Negative covid required.  [phone]   10/22 notes state Range said pt needs itc, which is currently at cap.  [facility] [place] is posting 0 bed. Negative covid. LOW acuity. (No lines, drains, or tubes, oxygen, CPAP, IV, etc.). [phone]. Per call @8:18am on 10/20/23,  no beds available until Monday.   [facility] [place] is posting 18 beds. No covid test required.  [phone]; per call at 7:36 am to Dennise, they have 8 adult beds and 9 child beds avail.  Per call at 2:09 pm to Lencho, they can review pt. Author faxed clinical, labs and face sheet at 2:15 pm. Passed to claire shift in handoff.   [facility] ([facility]) is posting 3 beds. Negative covid. Mixed unit/low acuity. (No. lines, drains, or tubes, oxygen, CPAP, IV, etc.). [phone]          Paged Fadia " at 1:56 pm.   Per Fadia, pt needs a private room. No private rooms avail.  Await decision from [facility]J. Passed to claire shift. mbw    Pt remains on the work list pending appropriate bed availability.

## 2023-10-22 NOTE — PROGRESS NOTES
"Triage & Transition Services, Extended Care     Therapy Progress Note    Patient: Tenzin goes by \"Tenzin,\" uses he/him pronouns  Date of Service: October 22, 2023  Site of Service: [facility] ED  Patient was seen virtually ([employer] cart or other teleconferencing device).     Presenting problem:   Tenzin is followed related to Placement delay: boarding for IP MH placement . Please see initial DEC/LM Crisis Assessment completed by Radha Mckeon on 10/21/23 for complete assessment information. Notable concerns include Homicidal ideation, AH/VH.     Individuals Present: Tenzin & Vince Root    Session start: 10:34 AM  Session end: 11:08 AM  Session duration in minutes: 34  Session number: 1  Anticipated number of sessions or this episode of care: 1-4  CPT utilized: 35233 - Psychotherapy (with patient) - 30 (16-37*) min    Current Presentation:   Writer and patient met virtually. Patient continues to endorse ongoing homicidal ideation towards 3 of his mental health supports. He states he continues to have violent thoughts of slitting there throats or cutting their heads off. He states he does not want to harm anyone which is why he came to the hospital, but feels he will act on these thoughts if he sees them stating \"I know I will kill them if I see them\" and \"I'm not going forwards, I am going backwards working with them\". He continues to endorse resentment towards his outpatient providers, and reports increasing anger regarding these supports.  Additionally patient appears to be somewhat paranoid regarding people in the [place] area stating that people are out to get him and harm him.  Writer did attempt to talk to him about this and he reports that he often sees people giving him the middle finger for no reason and feels that others despise him.  He does state this is led him to want to move out of the [place] region but has been finding this immensely difficult due to being on SSDI.  Writer attempted to talk with " patient about other supports who he does trust however he reports the only person he trusts in the [place] area is actually Francisco Javiery his old  [employer] worker.  He also reports he works with Karmen Anderson's care coordinator out of [place]. Throughout the session patient endorses auditory hallucinations     Mental Status Exam:   Appearance: awake, alert  Attitude: cooperative  Eye Contact: fair  Mood:  Worried  Affect: intensity is blunted  Speech: clear, coherent  Psychomotor Behavior: no evidence of tardive dyskinesia, dystonia, or tics  Thought Process:  linear and circumstantial  Associations: no loose associations  Thought Content: auditory hallucinations present and homicidal thoughts  Insight: good  Judgement: fair  Oriented to: time, person, and place  Attention Span and Concentration: intact  Recent and Remote Memory: intact    Diagnosis:    Paranoid schizophrenia (H) F20.0     Therapeutic Intervention(s):   Provided active listening, unconditional positive regard, and validation. Engaged in guided discovery, explored patient's perspectives and helped expand them through socratic dialogue. Engaged in social skills training.    Treatment Objective(s) Addressed:   The focus of this session was on rapport building, orienting the patient to therapy, and assessing safety.     Progress Towards Goals:   Patient reports  no change in  symptoms. Patient is making progress towards treatment goals as evidenced by openly engaging in video sessions.     Case Management:   N/A, Patient declines     General Recommendations:   Continue to monitor for harm. Consider: Use clear and concise directions, too many words can be overwhelming and Verbally state expectations     Plan:   Inpatient Mental Health: Writer continues to recommend inpatient mental health placement due to ongoing thoughts of homicide as well as ongoing auditory and visual hallucinations and paranoia. Patient remains voluntarily in agreement with inpatient  mental health placement.    Plan for Care reviewed with Assigned Medical Provider? Yes. Provider, Dr Quispe, response: acknowledges plan.     Vince Root   Licensed Mental Health Professional (LMHP), [facility]  [phone]

## 2023-10-23 ENCOUNTER — TELEPHONE (OUTPATIENT)
Dept: FAMILY MEDICINE | Facility: OTHER | Age: 48
End: 2023-10-23

## 2023-10-23 NOTE — CONFIDENTIAL NOTE
10/23/23 Duty to Warn:    Writer called Di Pena, Ana Rodriguez and Deena WADDELL to connect regarding a duty to warn as patient had made comments of slitting their throats or cutting their heads off.     1:32 PM Updated Deena WADDELL regarding Duty to Warn  1:54 PM Received a call back from Di Pena regarding Duty to Warn.  2:18 PM Updated Ana Rodriguez regarding Duty to Warn

## 2023-10-23 NOTE — ED NOTES
Attempted for 3rd time to give report to [facility] without success. Was told to call back in 40 minutes.

## 2023-10-23 NOTE — TELEPHONE ENCOUNTER
MBL-patient FYI patient was admitted to [facility] on 10.22.23    Please call if there are questions    Thank You    Kacy Gann on 10/23/2023 at 10:29 AM

## 2023-10-23 NOTE — ED NOTES
Protective transport here to transfer pt to [facility]. Pt was calm and cooperative. All pt belongings returned.

## 2023-10-23 NOTE — ED NOTES
Discussed with pt transfer to [facility]. Pt understanding of transfer. Protective transport ETA 1930.

## 2023-11-06 ENCOUNTER — TELEPHONE (OUTPATIENT)
Dept: FAMILY MEDICINE | Facility: OTHER | Age: 48
End: 2023-11-06

## 2023-11-06 NOTE — TELEPHONE ENCOUNTER
MBL-patient was discharged from [facility] in [place] on 11.03.23    Please call if you have questions    Thank You    Kacy Gann on 11/6/2023 at 8:37 AM

## 2024-04-28 ENCOUNTER — HEALTH MAINTENANCE LETTER (OUTPATIENT)
Age: 49
End: 2024-04-28

## 2024-12-11 ENCOUNTER — APPOINTMENT (OUTPATIENT)
Dept: GENERAL RADIOLOGY | Facility: OTHER | Age: 49
End: 2024-12-11
Attending: FAMILY MEDICINE
Payer: COMMERCIAL

## 2024-12-11 ENCOUNTER — HOSPITAL ENCOUNTER (EMERGENCY)
Facility: OTHER | Age: 49
Discharge: HOME | End: 2024-12-12
Attending: FAMILY MEDICINE
Payer: COMMERCIAL

## 2024-12-11 DIAGNOSIS — U07.1 COVID-19: ICD-10-CM

## 2024-12-11 LAB
ANION GAP SERPL CALCULATED.3IONS-SCNC: 7 MMOL/L (ref 7–15)
BASOPHILS # BLD AUTO: 0 10E3/UL (ref 0–0.2)
BASOPHILS NFR BLD AUTO: 1 %
BUN SERPL-MCNC: 9.4 MG/DL (ref 6–20)
CALCIUM SERPL-MCNC: 9.2 MG/DL (ref 8.8–10.4)
CHLORIDE SERPL-SCNC: 105 MMOL/L (ref 98–107)
CREAT SERPL-MCNC: 1.05 MG/DL (ref 0.51–1.17)
EGFRCR SERPLBLD CKD-EPI 2021: 88 ML/MIN/1.73M2
EOSINOPHIL # BLD AUTO: 0.2 10E3/UL (ref 0–0.7)
EOSINOPHIL NFR BLD AUTO: 5 %
ERYTHROCYTE [DISTWIDTH] IN BLOOD BY AUTOMATED COUNT: 12.6 % (ref 10–15)
FLUAV RNA SPEC QL NAA+PROBE: NEGATIVE
FLUBV RNA RESP QL NAA+PROBE: NEGATIVE
GLUCOSE SERPL-MCNC: 106 MG/DL (ref 70–99)
HCO3 SERPL-SCNC: 27 MMOL/L (ref 22–29)
HCT VFR BLD AUTO: 37.4 % (ref 35–53)
HGB BLD-MCNC: 12.9 G/DL (ref 11.7–17.7)
HOLD SPECIMEN: NORMAL
HOLD SPECIMEN: NORMAL
IMM GRANULOCYTES # BLD: 0 10E3/UL
IMM GRANULOCYTES NFR BLD: 1 %
LYMPHOCYTES # BLD AUTO: 0.9 10E3/UL (ref 0.8–5.3)
LYMPHOCYTES NFR BLD AUTO: 22 %
MCH RBC QN AUTO: 30.6 PG (ref 26.5–33)
MCHC RBC AUTO-ENTMCNC: 34.5 G/DL (ref 31.5–36.5)
MCV RBC AUTO: 89 FL (ref 78–100)
MONOCYTES # BLD AUTO: 0.6 10E3/UL (ref 0–1.3)
MONOCYTES NFR BLD AUTO: 14 %
NEUTROPHILS # BLD AUTO: 2.5 10E3/UL (ref 1.6–8.3)
NEUTROPHILS NFR BLD AUTO: 57 %
NRBC # BLD AUTO: 0 10E3/UL
NRBC BLD AUTO-RTO: 0 /100
PLATELET # BLD AUTO: 193 10E3/UL (ref 150–450)
POTASSIUM SERPL-SCNC: 4 MMOL/L (ref 3.4–5.3)
RBC # BLD AUTO: 4.22 10E6/UL (ref 3.8–5.9)
RSV RNA SPEC NAA+PROBE: NEGATIVE
S PYO DNA THROAT QL NAA+PROBE: NOT DETECTED
SARS-COV-2 RNA RESP QL NAA+PROBE: POSITIVE
SODIUM SERPL-SCNC: 139 MMOL/L (ref 135–145)
TROPONIN T SERPL HS-MCNC: 11 NG/L
WBC # BLD AUTO: 4.3 10E3/UL (ref 4–11)

## 2024-12-11 PROCEDURE — 99283 EMERGENCY DEPT VISIT LOW MDM: CPT | Performed by: FAMILY MEDICINE

## 2024-12-11 PROCEDURE — 36415 COLL VENOUS BLD VENIPUNCTURE: CPT | Performed by: FAMILY MEDICINE

## 2024-12-11 PROCEDURE — 84484 ASSAY OF TROPONIN QUANT: CPT | Performed by: FAMILY MEDICINE

## 2024-12-11 PROCEDURE — 87637 SARSCOV2&INF A&B&RSV AMP PRB: CPT | Performed by: FAMILY MEDICINE

## 2024-12-11 PROCEDURE — 93010 ELECTROCARDIOGRAM REPORT: CPT | Performed by: INTERNAL MEDICINE

## 2024-12-11 PROCEDURE — 85004 AUTOMATED DIFF WBC COUNT: CPT | Performed by: FAMILY MEDICINE

## 2024-12-11 PROCEDURE — 99285 EMERGENCY DEPT VISIT HI MDM: CPT | Mod: 25 | Performed by: FAMILY MEDICINE

## 2024-12-11 PROCEDURE — 80048 BASIC METABOLIC PNL TOTAL CA: CPT | Performed by: FAMILY MEDICINE

## 2024-12-11 PROCEDURE — 71045 X-RAY EXAM CHEST 1 VIEW: CPT

## 2024-12-11 PROCEDURE — 87651 STREP A DNA AMP PROBE: CPT | Performed by: FAMILY MEDICINE

## 2024-12-11 PROCEDURE — 93005 ELECTROCARDIOGRAM TRACING: CPT | Performed by: FAMILY MEDICINE

## 2024-12-11 ASSESSMENT — ENCOUNTER SYMPTOMS
FEVER: 0
CHEST TIGHTNESS: 0
COUGH: 1
SORE THROAT: 1
SHORTNESS OF BREATH: 1

## 2024-12-11 ASSESSMENT — COLUMBIA-SUICIDE SEVERITY RATING SCALE - C-SSRS
2. HAVE YOU ACTUALLY HAD ANY THOUGHTS OF KILLING YOURSELF IN THE PAST MONTH?: NO
6. HAVE YOU EVER DONE ANYTHING, STARTED TO DO ANYTHING, OR PREPARED TO DO ANYTHING TO END YOUR LIFE?: NO
1. IN THE PAST MONTH, HAVE YOU WISHED YOU WERE DEAD OR WISHED YOU COULD GO TO SLEEP AND NOT WAKE UP?: NO

## 2024-12-11 ASSESSMENT — ACTIVITIES OF DAILY LIVING (ADL)
ADLS_ACUITY_SCORE: 41
ADLS_ACUITY_SCORE: 41

## 2024-12-12 VITALS
HEART RATE: 89 BPM | BODY MASS INDEX: 35.1 KG/M2 | TEMPERATURE: 98.8 F | HEIGHT: 69 IN | RESPIRATION RATE: 18 BRPM | DIASTOLIC BLOOD PRESSURE: 82 MMHG | WEIGHT: 237 LBS | SYSTOLIC BLOOD PRESSURE: 128 MMHG | OXYGEN SATURATION: 96 %

## 2024-12-12 NOTE — ED PROVIDER NOTES
History     Chief Complaint   Patient presents with    Nasal Congestion    Cough     The history is provided by the patient.     Tenzin Ca is a 49 year old adult who came in with SOB, cough and sore throat. He says that he cannot breathe when he lays back in bed. No fever, no chest pain. Symptoms started a few days ago and have been getting worse. He is not able to sleep due to symptoms.     He has a history of paranoid schizophrenia, obesity    Allergies:  Allergies   Allergen Reactions    Tylenol [Acetaminophen]        Problem List:    Patient Active Problem List    Diagnosis Date Noted    Blood per rectum 04/20/2022     Priority: Medium    Chronic bilateral low back pain without sciatica 02/08/2022     Priority: Medium    Numbness 11/22/2021     Priority: Medium    S/P cervical spinal fusion 10/14/2021     Priority: Medium    Fusion of spine of cervical region 09/20/2021     Priority: Medium    Myelopathy concurrent with and due to spinal stenosis of cervical region (H) 09/06/2021     Priority: Medium    Paranoid schizophrenia (H) 09/04/2021     Priority: Medium    Sebaceous cyst 05/26/2021     Priority: Medium    Skin nodule 05/26/2021     Priority: Medium    Morbid obesity (H) 05/12/2021     Priority: Medium    Non-intractable vomiting with nausea 05/12/2021     Priority: Medium    Vitamin D deficiency 12/26/2018     Priority: Medium    Slow transit constipation 02/22/2018     Priority: Medium    Hypertriglyceridemia 02/20/2018     Priority: Medium    Major depressive disorder, recurrent episode (H) 02/20/2018     Priority: Medium    Psoriasis 02/20/2018     Priority: Medium    Schizophrenia (H) 02/20/2018     Priority: Medium     Overview:   hears voices      Torn medial meniscus 02/20/2018     Priority: Medium    Obesity 02/21/2017     Priority: Medium    S/P left knee arthroscopy 07/05/2016     Priority: Medium    Psychosis (H) 07/02/2015     Priority: Medium    Erectile dysfunction 04/23/2014      "Priority: Medium    Delayed ejaculation 06/03/2013     Priority: Medium        Past Medical History:    Past Medical History:   Diagnosis Date    Mental disorder     Personal history of other mental and behavioral disorders        Past Surgical History:    Past Surgical History:   Procedure Laterality Date    ARTHROSCOPY KNEE      Left Knee Arthroscopy and Partial Meniscectomy and Chondroplasty    COLONOSCOPY N/A 6/16/2022    Procedure: COLONOSCOPY;  Surgeon: Frank Toro MD;  Location:  OR       Family History:    Family History   Problem Relation Age of Onset    Substance Abuse Mother         Alcohol/Drug    Alcoholism Mother         Alcoholism    Substance Abuse Brother         Alcohol/Drug    Alcoholism Brother         Alcoholism    Substance Abuse Sister         Alcohol/Drug    Alcoholism Sister         Alcoholism       Social History:  Marital Status:  Single [1]  Social History     Tobacco Use    Smoking status: Never     Passive exposure: Yes    Smokeless tobacco: Never   Vaping Use    Vaping status: Never Used   Substance Use Topics    Alcohol use: No     Alcohol/week: 0.0 standard drinks of alcohol    Drug use: No        Medications:    ALPRAZolam (XANAX) 0.5 MG tablet  clonazePAM (KLONOPIN) 1 MG tablet  gabapentin (NEURONTIN) 100 MG capsule  LINZESS 290 MCG capsule  omeprazole (PRILOSEC) 20 MG DR capsule  ondansetron (ZOFRAN ODT) 4 MG ODT tab  OXcarbazepine (TRILEPTAL) 300 MG tablet  OXcarbazepine (TRILEPTAL) 600 MG tablet  prazosin (MINIPRESS) 1 MG capsule  zolpidem (AMBIEN) 10 MG tablet      Review of Systems   Constitutional:  Negative for fever.   HENT:  Positive for sore throat.    Respiratory:  Positive for cough and shortness of breath. Negative for chest tightness.    Cardiovascular:  Negative for chest pain.   All other systems reviewed and are negative.      Physical Exam   BP: (!) 141/84  Temp: 98.8  F (37.1  C)  Height: 175.3 cm (5' 9\")  Weight: 107.5 kg (237 lb)      Physical " Exam  Vitals and nursing note reviewed.   Constitutional:       General: He is not in acute distress.     Appearance: He is ill-appearing.   Cardiovascular:      Rate and Rhythm: Normal rate and regular rhythm.      Pulses: Normal pulses.      Heart sounds: Normal heart sounds.   Pulmonary:      Effort: Pulmonary effort is normal. No respiratory distress.   Skin:     General: Skin is warm and dry.   Neurological:      General: No focal deficit present.      Mental Status: He is alert and oriented to person, place, and time.         EKG: NSR, rate 91, left axis deviation- stable    Results for orders placed or performed during the hospital encounter of 12/11/24 (from the past 24 hours)   Influenza A/B, RSV and SARS-CoV2 PCR (COVID-19) Nose    Specimen: Nose; Swab   Result Value Ref Range    Influenza A PCR Negative Negative    Influenza B PCR Negative Negative    RSV PCR Negative Negative    SARS CoV2 PCR Positive (A) Negative    Narrative    Testing was performed using the Xpert Xpress CoV2/Flu/RSV Assay on the [employer] GeneXpert Instrument. This test should be ordered for the detection of SARS-CoV2, influenza, and RSV viruses in individuals with signs and symptoms of respiratory tract infection. This test is for in vitro diagnostic use under the [place] FDA for laboratories certified under CLIA to perform high or moderate complexity testing. This test has been [place] FDA cleared. A negative result does not rule out the presence of PCR inhibitors in the specimen or target RNA in concentration below the limit of detection for the assay. If only one viral target is positive but coinfection with multiple targets is suspected, the sample should be re-tested with another FDA cleared, approved, or authorized test, if coninfection would change clinical management. This test was validated by the [facility] [employer]. These laboratories are certified under the Clinical Laboratory Improvement Amendments of 1988 (CLIA-88) as  "qualified to perfom high complexity laboratory testing.   Group A Streptococcus PCR Throat Swab    Specimen: Throat; Swab   Result Value Ref Range    Group A strep by PCR Not Detected Not Detected    Narrative    The Xpert Xpress Strep A test, performed on the [employer], is a rapid, qualitative in vitro diagnostic test for the detection of Streptococcus pyogenes (Group A ß-hemolytic Streptococcus, Strep A) in throat swab specimens from patients with signs and symptoms of pharyngitis. The Xpert Xpress Strep A test can be used as an aid in the diagnosis of Group A Streptococcal pharyngitis. The assay is not intended to monitor treatment for Group A Streptococcus infections. The Xpert Xpress Strep A test utilizes an automated real-time polymerase chain reaction (PCR) to detect Streptococcus pyogenes DNA.   CBC with platelets differential    Narrative    The following orders were created for panel order CBC with platelets differential.  Procedure                               Abnormality         Status                     ---------                               -----------         ------                     CBC with platelets and d...[[id number]]                      Final result                 Please view results for these tests on the individual orders.   Basic metabolic panel   Result Value Ref Range    Sodium 139 135 - 145 mmol/L    Potassium 4.0 3.4 - 5.3 mmol/L    Chloride 105 98 - 107 mmol/L    Carbon Dioxide (CO2) 27 22 - 29 mmol/L    Anion Gap 7 7 - 15 mmol/L    Urea Nitrogen 9.4 6.0 - 20.0 mg/dL    Creatinine 1.05 0.51 - 1.17 mg/dL    GFR Estimate 88 >60 mL/min/1.73m2    Calcium 9.2 8.8 - 10.4 mg/dL    Glucose 106 (H) 70 - 99 mg/dL    Narrative    The generation of reference intervals for this test is currently based on binary male or female sex. If the electronic health record information indicates another gender identity or if Legal Sex is recorded as \"Unknown\", both male and female " "reference intervals are provided where applicable, and should be considered according to the individual's appropriate clinical context.   Troponin T, High Sensitivity   Result Value Ref Range    Troponin T, High Sensitivity 11 <=22 ng/L   CBC with platelets and differential   Result Value Ref Range    WBC Count 4.3 4.0 - 11.0 10e3/uL    RBC Count 4.22 3.80 - 5.90 10e6/uL    Hemoglobin 12.9 11.7 - 17.7 g/dL    Hematocrit 37.4 35.0 - 53.0 %    MCV 89 78 - 100 fL    MCH 30.6 26.5 - 33.0 pg    MCHC 34.5 31.5 - 36.5 g/dL    RDW 12.6 10.0 - 15.0 %    Platelet Count 193 150 - 450 10e3/uL    % Neutrophils 57 %    % Lymphocytes 22 %    % Monocytes 14 %    % Eosinophils 5 %    % Basophils 1 %    % Immature Granulocytes 1 %    NRBCs per 100 WBC 0 <1 /100    Absolute Neutrophils 2.5 1.6 - 8.3 10e3/uL    Absolute Lymphocytes 0.9 0.8 - 5.3 10e3/uL    Absolute Monocytes 0.6 0.0 - 1.3 10e3/uL    Absolute Eosinophils 0.2 0.0 - 0.7 10e3/uL    Absolute Basophils 0.0 0.0 - 0.2 10e3/uL    Absolute Immature Granulocytes 0.0 <=0.4 10e3/uL    Absolute NRBCs 0.0 10e3/uL    Narrative    The generation of reference intervals for this test is currently based on binary male or female sex. If the electronic health record information indicates another gender identity or if Legal Sex is recorded as \"Unknown\", both male and female reference intervals are provided where applicable, and should be considered according to the individual's appropriate clinical context.   Extra Tube    Narrative    The following orders were created for panel order Extra Tube.  Procedure                               Abnormality         Status                     ---------                               -----------         ------                     Extra Blue Top Tube[[id number]]                              Final result               Extra Red Top Tube[[id number]]                               Final result                 Please view results for these tests on the " "individual orders.   Extra Blue Top Tube   Result Value Ref Range    Hold Specimen JIC    Extra Red Top Tube   Result Value Ref Range    Hold Specimen JIC    XR Chest Port 1 View    Narrative    EXAM: XR CHEST PORT 1 VIEW  LOCATION: [facility]  DATE/TIME: 12/11/2024 10:39 PM CST    INDICATION: Chest pain, SOB, COVID (+).  COMPARISON: None available.      Impression    IMPRESSION: Single AP view of the chest was obtained. Cardiomediastinal silhouette is within normal limits. Mild basilar pulmonary opacities, could be atelectasis or infection. No significant pleural effusion or pneumothorax. Postsurgical changes of   lower cervical spine fusion.          Assessments & Plan (with Medical Decision Making)  Tenzin Ca is a 49 year old adult who came in with SOB, cough and sore throat. He says that he cannot breathe when he lays back in bed. No fever, no chest pain. Symptoms started a few days ago and have been getting worse. He is not able to sleep due to symptoms.   He has a history of paranoid schizophrenia, obesity   VS in the ED BP (!) 141/84   Temp 98.8  F (37.1  C) (Tympanic)   Ht 1.753 m (5' 9\")   Wt 107.5 kg (237 lb)   BMI 35.00 kg/m    Exam shows normal findings.  EKG stable.  Labs show CBC normal, BMP normal, troponin 11, strep negative, 4 Plex positive for COVID.  Chest xray stable.  12:38 AM  He feels better.      I have reviewed the nursing notes.    I have reviewed the findings, diagnosis, plan and need for follow up with the patient.  Medical Decision Making  The patient's presentation was of moderate complexity (an acute illness with systemic symptoms).    The patient's evaluation involved:  an assessment requiring an independent historian (see separate area of note for details)  ordering and/or review of 3+ test(s) in this encounter (see separate area of note for details)    The patient's management necessitated only low risk treatment.        Final diagnoses:   COVID-19 "       12/11/2024   [facility], Billy Weinstein MD  12/12/24 0039

## 2024-12-12 NOTE — ED TRIAGE NOTES
"Pt arrives to ED via taxi. C/o cough, ST, and Nasal congestion. Pt reports these symptoms began a few days ago and have been worsening. Pt reports he has had difficulty sleeping because of this. BP (!) 141/84   Temp 98.8  F (37.1  C) (Tympanic)   Ht 1.753 m (5' 9\")   Wt 107.5 kg (237 lb)   BMI 35.00 kg/m         Triage Assessment (Adult)       Row Name 12/11/24 2055          Triage Assessment    Airway WDL WDL        Respiratory WDL    Respiratory WDL WDL        Skin Circulation/Temperature WDL    Skin Circulation/Temperature WDL WDL        Cardiac WDL    Cardiac WDL WDL        Peripheral/Neurovascular WDL    Peripheral Neurovascular WDL WDL        Cognitive/Neuro/Behavioral WDL    Cognitive/Neuro/Behavioral WDL WDL                     "

## 2024-12-13 LAB
ATRIAL RATE - MUSE: 91 BPM
DIASTOLIC BLOOD PRESSURE - MUSE: NORMAL MMHG
INTERPRETATION ECG - MUSE: NORMAL
P AXIS - MUSE: 43 DEGREES
PR INTERVAL - MUSE: 178 MS
QRS DURATION - MUSE: 110 MS
QT - MUSE: 364 MS
QTC - MUSE: 447 MS
R AXIS - MUSE: -43 DEGREES
SYSTOLIC BLOOD PRESSURE - MUSE: NORMAL MMHG
T AXIS - MUSE: 21 DEGREES
VENTRICULAR RATE- MUSE: 91 BPM

## 2025-04-26 ENCOUNTER — HEALTH MAINTENANCE LETTER (OUTPATIENT)
Age: 50
End: 2025-04-26

## 2025-05-19 ENCOUNTER — OFFICE VISIT (OUTPATIENT)
Dept: FAMILY MEDICINE | Facility: OTHER | Age: 50
End: 2025-05-19
Payer: COMMERCIAL

## 2025-05-19 ENCOUNTER — HOSPITAL ENCOUNTER (OUTPATIENT)
Dept: GENERAL RADIOLOGY | Facility: OTHER | Age: 50
Discharge: HOME | End: 2025-05-19
Attending: NURSE PRACTITIONER
Payer: COMMERCIAL

## 2025-05-19 VITALS
SYSTOLIC BLOOD PRESSURE: 122 MMHG | DIASTOLIC BLOOD PRESSURE: 80 MMHG | HEIGHT: 69 IN | BODY MASS INDEX: 42.06 KG/M2 | HEART RATE: 78 BPM | RESPIRATION RATE: 18 BRPM | OXYGEN SATURATION: 97 % | WEIGHT: 284 LBS | TEMPERATURE: 97.8 F

## 2025-05-19 DIAGNOSIS — R20.0 LEFT LEG NUMBNESS: ICD-10-CM

## 2025-05-19 DIAGNOSIS — R29.898 LEFT LEG WEAKNESS: ICD-10-CM

## 2025-05-19 DIAGNOSIS — R26.89 POOR BALANCE: ICD-10-CM

## 2025-05-19 DIAGNOSIS — M54.32 LEFT SIDED SCIATICA: Primary | ICD-10-CM

## 2025-05-19 PROCEDURE — 1126F AMNT PAIN NOTED NONE PRSNT: CPT | Performed by: NURSE PRACTITIONER

## 2025-05-19 PROCEDURE — 72100 X-RAY EXAM L-S SPINE 2/3 VWS: CPT | Mod: 26 | Performed by: RADIOLOGY

## 2025-05-19 PROCEDURE — G0463 HOSPITAL OUTPT CLINIC VISIT: HCPCS

## 2025-05-19 PROCEDURE — 3079F DIAST BP 80-89 MM HG: CPT | Performed by: NURSE PRACTITIONER

## 2025-05-19 PROCEDURE — 72100 X-RAY EXAM L-S SPINE 2/3 VWS: CPT

## 2025-05-19 PROCEDURE — 99213 OFFICE O/P EST LOW 20 MIN: CPT | Performed by: NURSE PRACTITIONER

## 2025-05-19 PROCEDURE — 3074F SYST BP LT 130 MM HG: CPT | Performed by: NURSE PRACTITIONER

## 2025-05-19 ASSESSMENT — PATIENT HEALTH QUESTIONNAIRE - PHQ9
SUM OF ALL RESPONSES TO PHQ QUESTIONS 1-9: 0
10. IF YOU CHECKED OFF ANY PROBLEMS, HOW DIFFICULT HAVE THESE PROBLEMS MADE IT FOR YOU TO DO YOUR WORK, TAKE CARE OF THINGS AT HOME, OR GET ALONG WITH OTHER PEOPLE: NOT DIFFICULT AT ALL
SUM OF ALL RESPONSES TO PHQ QUESTIONS 1-9: 0

## 2025-05-19 ASSESSMENT — PAIN SCALES - GENERAL: PAINLEVEL_OUTOF10: NO PAIN (0)

## 2025-05-19 NOTE — PROGRESS NOTES
"ASSESSMENT/PLAN:     I have reviewed the nursing notes.  I have reviewed the findings, diagnosis, plan and need for follow up with the patient.        1. Left leg numbness  2. Left leg weakness  3. Poor balance  - XR Lumbar Spine 2/3 Views    4. Left sided sciatica (Primary)  Hx of chronic lower back with bilateral sciatica.  Acute flare of left sided sciatica without current back pain.  No red flag symptoms - no foot drop, bowel or bladder incontinence, etc  Xray completed today and personally reviewed, radiologist over read:  There is normal alignment.  Vertebral body heights are maintained.   Disc spaces are maintained but there are mild degenerative endplate  changes at multiple levels. There is also mild facet arthrosis in the  lower lumbar spine.   Continue Neurontin   Offered oral steroids - patient declines  Offered physical therapy referral - patient declines    Encouraged patient to use assistive device such as cane, etc as needed due to poor balance.  Recommend follow up with PCP for ongoing management        I explained my diagnostic considerations and recommendations to the patient, who voiced understanding and agreement with the treatment plan. All questions were answered. We discussed potential side effects of any prescribed or recommended therapies, as well as expectations for response to treatments.    Ayaka Baldwin NP  [facility]      SUBJECTIVE:   Tenzin Ca is a 49 year old adult who presents to clinic today for the following health issues:  Leg swelling    HPI  Patient with concerns of his left leg being swollen and \"twice as large as his right\" for the past week along with intermittent electrical shooting pain down the back of his entire leg and generalized weakness in his entire left leg causing him pain with walking and recent falls from loss of balance.  No fevers.  No known injury or trauma.  No bowel or bladder difficulties.  No rashes.  Hx of chronic low back " pain with sciatica but denies any current back pain.  No calf pain.            Past Medical History:   Diagnosis Date    Mental disorder     Disability related to mental illness    Personal history of other mental and behavioral disorders     2016     Past Surgical History:   Procedure Laterality Date    ARTHROSCOPY KNEE      Left Knee Arthroscopy and Partial Meniscectomy and Chondroplasty    COLONOSCOPY N/A 6/16/2022    Procedure: COLONOSCOPY;  Surgeon: Frank Toro MD;  Location:  OR     Social History     Tobacco Use    Smoking status: Never     Passive exposure: Yes    Smokeless tobacco: Never   Substance Use Topics    Alcohol use: No     Alcohol/week: 0.0 standard drinks of alcohol     Current Outpatient Medications   Medication Sig Dispense Refill    ALPRAZolam (XANAX) 0.5 MG tablet TAKE 1/2 TO 1 TAB BY MOUTH PRIOR TO AN EVENT FOR SEVERE ANXIETY. TAKE A 2ND TAB IF NEEDED APPROXIMATELY 40 MINUTES AFTER 1ST DOSE.      clonazePAM (KLONOPIN) 1 MG tablet TAKE 1 TABLET BY MOUTH 3 TIMES DAILY AS NEEDED FOR SEVERE ANXIETY AND VOICES      gabapentin (NEURONTIN) 100 MG capsule Take 1 capsule by mouth 3 times daily      LINZESS 290 MCG capsule Take 1 capsule by mouth daily      omeprazole (PRILOSEC) 20 MG DR capsule TAKE 1 CAPSULE BY MOUTH ONETIME A DAY. TAKE BEFORE MEALS. DO NOT CRUSH.      ondansetron (ZOFRAN ODT) 4 MG ODT tab Take 1 tablet (4 mg) by mouth every 8 hours as needed for nausea 25 tablet 0    OXcarbazepine (TRILEPTAL) 300 MG tablet Take 300 mg by mouth every morning      OXcarbazepine (TRILEPTAL) 600 MG tablet Take 600 mg by mouth At Bedtime      prazosin (MINIPRESS) 1 MG capsule Take 1 capsule by mouth nightly as needed      zolpidem (AMBIEN) 10 MG tablet Take 10 mg by mouth At Bedtime       Allergies   Allergen Reactions    Tylenol [Acetaminophen]          Past medical history, past surgical history, current medications and allergies reviewed and accurate to the best of my knowledge.   "      OBJECTIVE:     /80 (BP Location: Right arm, Patient Position: Sitting, Cuff Size: Adult Large)   Pulse 78   Temp 97.8  F (36.6  C) (Tympanic)   Resp 18   Ht 1.753 m (5' 9\")   Wt 128.8 kg (284 lb)   SpO2 97%   BMI 41.94 kg/m    Body mass index is 41.94 kg/m .      Physical Exam  General Appearance: Well appearing adult male, appropriate appearance for age. No acute distress  Respiratory: normal chest wall and respirations.  Normal effort.   No cough appreciated.  Cardiac:  CMS intact to right lower extremity with strong pedal pulse.  No foot edema.  Trace pitting edema of ankle at sock line.  Right calf soft and non-tender.  Right calf measured and 0.5 inch larger than left.  Right thigh measured and 1 inch larger than left.  Left thigh soft and non-tender.    Musculoskeletal:   Bilateral lower extremities with equal movement with exception of lifting left foot up onto right knee he is only able to get about half way then needs to use hand to lift the remainder.   Loss of balance noted when lifting left leg.  Able to stand on left leg without loss of balance.  No foot drop.  Normal gait.  No use of assistive device.  Dermatological: no rashes noted of bilateral lower extremities   Psychological: normal affect, alert, oriented, and pleasant.       Imaging:  Results for orders placed or performed in visit on 05/19/25   XR Lumbar Spine 2/3 Views     Status: None    Narrative    Procedure: XR LUMBAR SPINE 2/3 VIEWS    HISTORY: Left leg numbness; Left leg weakness; Fall, initial encounter    TECHNIQUE: Lumbar spine 3 views.    COMPARISON: 11/2/2022    FINDINGS:    There is normal alignment.  Vertebral body heights are maintained.   Disc spaces are maintained but there are mild degenerative endplate  changes at multiple levels. There is also mild facet arthrosis in the  lower lumbar spine.       Impression    IMPRESSION: Multilevel degenerative disease      MARKEL MCCANN MD         SYSTEM ID:  " [id number]

## 2025-05-19 NOTE — PROGRESS NOTES
"Chief Complaint   Patient presents with    Musculoskeletal Problem     left   Patient is here to be seen for left leg swelling.    FOOD SECURITY SCREENING QUESTIONS  Hunger Vital Signs:  Within the past 12 months we worried whether our food would run out before we got money to buy more. Never  Within the past 12 months the food we bought just didn't last and we didn't have money to get more. Never  Denise Cervantes 5/19/2025 11:56 AM      Initial /80 (BP Location: Right arm, Patient Position: Sitting, Cuff Size: Adult Large)   Pulse 78   Temp 97.8  F (36.6  C) (Tympanic)   Resp 18   Ht 1.753 m (5' 9\")   Wt 128.8 kg (284 lb)   SpO2 97%   BMI 41.94 kg/m   Estimated body mass index is 41.94 kg/m  as calculated from the following:    Height as of this encounter: 1.753 m (5' 9\").    Weight as of this encounter: 128.8 kg (284 lb).  Medication Reconciliation: complete    Denise Cervantes   Answers submitted by the patient for this visit:  Patient Health Questionnaire (Submitted on 5/19/2025)  If you checked off any problems, how difficult have these problems made it for you to do your work, take care of things at home, or get along with other people?: Not difficult at all  PHQ9 TOTAL SCORE: 0    "

## 2025-05-30 ENCOUNTER — APPOINTMENT (OUTPATIENT)
Dept: ULTRASOUND IMAGING | Facility: OTHER | Age: 50
End: 2025-05-30
Payer: COMMERCIAL

## 2025-05-30 ENCOUNTER — HOSPITAL ENCOUNTER (EMERGENCY)
Facility: OTHER | Age: 50
Discharge: HOME | End: 2025-05-30
Payer: COMMERCIAL

## 2025-05-30 VITALS
BODY MASS INDEX: 43.22 KG/M2 | TEMPERATURE: 98.1 F | RESPIRATION RATE: 18 BRPM | SYSTOLIC BLOOD PRESSURE: 175 MMHG | WEIGHT: 291.8 LBS | HEIGHT: 69 IN | DIASTOLIC BLOOD PRESSURE: 99 MMHG | HEART RATE: 72 BPM | OXYGEN SATURATION: 99 %

## 2025-05-30 DIAGNOSIS — M79.89 LEFT LEG SWELLING: ICD-10-CM

## 2025-05-30 LAB
ALBUMIN SERPL BCG-MCNC: 4 G/DL (ref 3.5–5.2)
ALP SERPL-CCNC: 90 U/L (ref 40–150)
ALT SERPL W P-5'-P-CCNC: 25 U/L (ref 0–70)
ANION GAP SERPL CALCULATED.3IONS-SCNC: 11 MMOL/L (ref 7–15)
AST SERPL W P-5'-P-CCNC: 24 U/L (ref 0–45)
BASOPHILS # BLD AUTO: 0 10E3/UL (ref 0–0.2)
BASOPHILS NFR BLD AUTO: 0 %
BILIRUB SERPL-MCNC: 0.3 MG/DL
BUN SERPL-MCNC: 7.9 MG/DL (ref 6–20)
CALCIUM SERPL-MCNC: 9.1 MG/DL (ref 8.8–10.4)
CHLORIDE SERPL-SCNC: 103 MMOL/L (ref 98–107)
CREAT SERPL-MCNC: 1.09 MG/DL (ref 0.51–1.17)
CRP SERPL-MCNC: 3.79 MG/L
EGFRCR SERPLBLD CKD-EPI 2021: 84 ML/MIN/1.73M2
EOSINOPHIL # BLD AUTO: 0.1 10E3/UL (ref 0–0.7)
EOSINOPHIL NFR BLD AUTO: 3 %
ERYTHROCYTE [DISTWIDTH] IN BLOOD BY AUTOMATED COUNT: 12 % (ref 10–15)
GLUCOSE SERPL-MCNC: 110 MG/DL (ref 70–99)
HCO3 SERPL-SCNC: 25 MMOL/L (ref 22–29)
HCT VFR BLD AUTO: 34.1 % (ref 35–53)
HGB BLD-MCNC: 12 G/DL (ref 11.7–17.7)
HOLD SPECIMEN: NORMAL
IMM GRANULOCYTES # BLD: 0 10E3/UL
IMM GRANULOCYTES NFR BLD: 0 %
LYMPHOCYTES # BLD AUTO: 1.4 10E3/UL (ref 0.8–5.3)
LYMPHOCYTES NFR BLD AUTO: 30 %
MCH RBC QN AUTO: 30.4 PG (ref 26.5–33)
MCHC RBC AUTO-ENTMCNC: 35.2 G/DL (ref 31.5–36.5)
MCV RBC AUTO: 86 FL (ref 78–100)
MONOCYTES # BLD AUTO: 0.4 10E3/UL (ref 0–1.3)
MONOCYTES NFR BLD AUTO: 9 %
NEUTROPHILS # BLD AUTO: 2.7 10E3/UL (ref 1.6–8.3)
NEUTROPHILS NFR BLD AUTO: 58 %
NRBC # BLD AUTO: 0 10E3/UL
NRBC BLD AUTO-RTO: 0 /100
NT-PROBNP SERPL-MCNC: 55 PG/ML (ref 0–192)
PLATELET # BLD AUTO: 177 10E3/UL (ref 150–450)
POTASSIUM SERPL-SCNC: 4 MMOL/L (ref 3.4–5.3)
PROT SERPL-MCNC: 6.8 G/DL (ref 6.4–8.3)
RBC # BLD AUTO: 3.95 10E6/UL (ref 3.8–5.9)
SODIUM SERPL-SCNC: 139 MMOL/L (ref 135–145)
WBC # BLD AUTO: 4.7 10E3/UL (ref 4–11)

## 2025-05-30 PROCEDURE — 93971 EXTREMITY STUDY: CPT | Mod: 26 | Performed by: RADIOLOGY

## 2025-05-30 PROCEDURE — 84155 ASSAY OF PROTEIN SERUM: CPT

## 2025-05-30 PROCEDURE — 99283 EMERGENCY DEPT VISIT LOW MDM: CPT

## 2025-05-30 PROCEDURE — 93971 EXTREMITY STUDY: CPT | Mod: LT

## 2025-05-30 PROCEDURE — 99284 EMERGENCY DEPT VISIT MOD MDM: CPT | Mod: 25

## 2025-05-30 PROCEDURE — 84520 ASSAY OF UREA NITROGEN: CPT

## 2025-05-30 PROCEDURE — 36415 COLL VENOUS BLD VENIPUNCTURE: CPT

## 2025-05-30 PROCEDURE — 83880 ASSAY OF NATRIURETIC PEPTIDE: CPT

## 2025-05-30 PROCEDURE — 86140 C-REACTIVE PROTEIN: CPT

## 2025-05-30 PROCEDURE — 85025 COMPLETE CBC W/AUTO DIFF WBC: CPT

## 2025-05-30 RX ORDER — LEVOTHYROXINE SODIUM 25 UG/1
25 TABLET ORAL
COMMUNITY
Start: 2025-05-24

## 2025-05-30 RX ORDER — ARIPIPRAZOLE 20 MG/1
TABLET ORAL
COMMUNITY

## 2025-05-30 RX ORDER — ARIPIPRAZOLE 5 MG/1
TABLET ORAL
COMMUNITY
Start: 2025-05-27

## 2025-05-30 RX ORDER — QUETIAPINE 400 MG/1
400 TABLET, FILM COATED, EXTENDED RELEASE ORAL
COMMUNITY

## 2025-05-30 RX ORDER — QUETIAPINE FUMARATE 50 MG/1
TABLET, FILM COATED ORAL
COMMUNITY

## 2025-05-30 ASSESSMENT — ACTIVITIES OF DAILY LIVING (ADL)
ADLS_ACUITY_SCORE: 42
ADLS_ACUITY_SCORE: 42

## 2025-05-30 NOTE — ED TRIAGE NOTES
"Pt presents to ED via private car with c/o LLE pain and swelling that started a month ago and has gotten worse. Pt has gained 7lbs since 5/19 when [facility]. BP (!) 176/112   Pulse 73   Temp 98  F (36.7  C) (Tympanic)   Resp 18   Ht 1.753 m (5' 9\")   Wt 132.4 kg (291 lb 12.8 oz)   SpO2 99%   BMI 43.09 kg/m         Triage Assessment (Adult)       Row Name 05/30/25 1759          Triage Assessment    Airway WDL WDL        Respiratory WDL    Respiratory WDL WDL        Skin Circulation/Temperature WDL    Skin Circulation/Temperature WDL WDL        Cardiac WDL    Cardiac WDL WDL        Peripheral/Neurovascular WDL    Peripheral Neurovascular WDL X;neurovascular assessment lower        Cognitive/Neuro/Behavioral WDL    Cognitive/Neuro/Behavioral WDL WDL        LLE Neurovascular Assessment    Temperature LLE warm     Color LLE no discoloration     Sensation LLE tenderness present;tingling present                     "

## 2025-05-30 NOTE — ED PROVIDER NOTES
History     Chief Complaint   Patient presents with    Leg Swelling     HPI  Tenzin Ca is a 49 year old adult with left leg swelling and burning pain. He reports left leg swelling for the past 3 weeks. Left lower leg pain for the past one week. Describes the pain as a burning pain. No injuries or trauma. He currently denies any numbness or tingling. Increased pain to his left leg when up ambulating with some numbness to the left lower leg when ambulating. He is concerned about a blood clot.     Allergies:  Allergies   Allergen Reactions    Acetaminophen Unknown    Aripiprazole Other (See Comments)     Vomiting, weight gain    Tadalafil Nausea       Problem List:    Patient Active Problem List    Diagnosis Date Noted    Blood per rectum 04/20/2022     Priority: Medium    Chronic bilateral low back pain without sciatica 02/08/2022     Priority: Medium    Numbness 11/22/2021     Priority: Medium    S/P cervical spinal fusion 10/14/2021     Priority: Medium    Fusion of spine of cervical region 09/20/2021     Priority: Medium    Myelopathy concurrent with and due to spinal stenosis of cervical region (H) 09/06/2021     Priority: Medium    Paranoid schizophrenia (H) 09/04/2021     Priority: Medium    Sebaceous cyst 05/26/2021     Priority: Medium    Skin nodule 05/26/2021     Priority: Medium    Morbid obesity (H) 05/12/2021     Priority: Medium    Non-intractable vomiting with nausea 05/12/2021     Priority: Medium    Vitamin D deficiency 12/26/2018     Priority: Medium    Slow transit constipation 02/22/2018     Priority: Medium    Hypertriglyceridemia 02/20/2018     Priority: Medium    Major depressive disorder, recurrent episode 02/20/2018     Priority: Medium    Psoriasis 02/20/2018     Priority: Medium    Schizophrenia (H) 02/20/2018     Priority: Medium     Overview:   hears voices      Torn medial meniscus 02/20/2018     Priority: Medium    Obesity 02/21/2017     Priority: Medium    S/P left knee  arthroscopy 07/05/2016     Priority: Medium    Psychosis (H) 07/02/2015     Priority: Medium    Erectile dysfunction 04/23/2014     Priority: Medium    Delayed ejaculation 06/03/2013     Priority: Medium        Past Medical History:    Past Medical History:   Diagnosis Date    Mental disorder     Personal history of other mental and behavioral disorders        Past Surgical History:    Past Surgical History:   Procedure Laterality Date    ARTHROSCOPY KNEE      Left Knee Arthroscopy and Partial Meniscectomy and Chondroplasty    COLONOSCOPY N/A 6/16/2022    Procedure: COLONOSCOPY;  Surgeon: Frank Toro MD;  Location:  OR       Family History:    Family History   Problem Relation Age of Onset    Substance Abuse Mother         Alcohol/Drug    Alcoholism Mother         Alcoholism    Substance Abuse Brother         Alcohol/Drug    Alcoholism Brother         Alcoholism    Substance Abuse Sister         Alcohol/Drug    Alcoholism Sister         Alcoholism       Social History:  Marital Status:  Single [1]  Social History     Tobacco Use    Smoking status: Never     Passive exposure: Yes    Smokeless tobacco: Never   Vaping Use    Vaping status: Never Used   Substance Use Topics    Alcohol use: No     Alcohol/week: 0.0 standard drinks of alcohol    Drug use: No        Medications:    ARIPiprazole (ABILIFY) 20 MG tablet  ARIPiprazole (ABILIFY) 5 MG tablet  B Complex Vitamins (VITAMIN B COMPLEX PO)  cholecalciferol 50 MCG (2000 UT) CAPS  clonazePAM (KLONOPIN) 1 MG tablet  gabapentin (NEURONTIN) 100 MG capsule  levothyroxine (SYNTHROID/LEVOTHROID) 25 MCG tablet  LINZESS 290 MCG capsule  omeprazole (PRILOSEC) 20 MG DR capsule  OXcarbazepine (TRILEPTAL) 300 MG tablet  prazosin (MINIPRESS) 1 MG capsule  QUEtiapine (SEROQUEL) 50 MG tablet  QUEtiapine ER (SEROQUEL XR) 400 MG 24 hr tablet  ALPRAZolam (XANAX) 0.5 MG tablet  ondansetron (ZOFRAN ODT) 4 MG ODT tab  OXcarbazepine (TRILEPTAL) 600 MG tablet  zolpidem (AMBIEN) 10 MG  "tablet        Review of Systems   Cardiovascular:  Positive for leg swelling.   All other systems reviewed and are negative.      Physical Exam   BP: (!) 176/112  Pulse: 73  Temp: 98  F (36.7  C)  Resp: 18  Height: 175.3 cm (5' 9\")  Weight: 132.4 kg (291 lb 12.8 oz)  SpO2: 99 %      Physical Exam  Vitals and nursing note reviewed.   Constitutional:       General: He is not in acute distress.     Appearance: Normal appearance. He is not ill-appearing.   HENT:      Head: Normocephalic.      Nose: Nose normal.      Mouth/Throat:      Mouth: Mucous membranes are moist.   Eyes:      Conjunctiva/sclera: Conjunctivae normal.   Cardiovascular:      Rate and Rhythm: Normal rate and regular rhythm.      Pulses: Normal pulses.      Heart sounds: Normal heart sounds.   Pulmonary:      Effort: Pulmonary effort is normal.      Breath sounds: Normal breath sounds.   Musculoskeletal:      Left lower leg: Edema present.      Comments: Left leg swelling compared to right lower leg. No redness or warmth. Full ROM of left lower leg. Distal pulse present. Sensation intact. Pain on palpation to left lower calf and below left knee.    Skin:     General: Skin is warm and dry.      Capillary Refill: Capillary refill takes less than 2 seconds.      Findings: No erythema.   Neurological:      General: No focal deficit present.      Mental Status: He is alert and oriented to person, place, and time. Mental status is at baseline.   Psychiatric:         Mood and Affect: Mood normal.            Results for orders placed or performed during the hospital encounter of 05/30/25 (from the past 24 hours)   CBC with platelets differential    Narrative    The following orders were created for panel order CBC with platelets differential.  Procedure                               Abnormality         Status                     ---------                               -----------         ------                     CBC with platelets and ...[[id number]]  " "Abnormal            Final result                 Please view results for these tests on the individual orders.   Comprehensive metabolic panel   Result Value Ref Range    Sodium 139 135 - 145 mmol/L    Potassium 4.0 3.4 - 5.3 mmol/L    Carbon Dioxide (CO2) 25 22 - 29 mmol/L    Anion Gap 11 7 - 15 mmol/L    Urea Nitrogen 7.9 6.0 - 20.0 mg/dL    Creatinine 1.09 0.51 - 1.17 mg/dL    GFR Estimate 84 >60 mL/min/1.73m2    Calcium 9.1 8.8 - 10.4 mg/dL    Chloride 103 98 - 107 mmol/L    Glucose 110 (H) 70 - 99 mg/dL    Alkaline Phosphatase 90 40 - 150 U/L    AST 24 0 - 45 U/L    ALT 25 0 - 70 U/L    Protein Total 6.8 6.4 - 8.3 g/dL    Albumin 4.0 3.5 - 5.2 g/dL    Bilirubin Total 0.3 <=1.2 mg/dL    Narrative    The generation of reference intervals for this test is currently based on binary male or female sex. If the electronic health record information indicates another gender identity or if Legal Sex is recorded as \"Unknown\", both male and female reference intervals are provided where applicable, and should be considered according to the individual's appropriate clinical context.   CRP inflammation   Result Value Ref Range    CRP Inflammation 3.79 <5.00 mg/L   NT-proBNP   Result Value Ref Range    NT-proBNP 55 0 - 192 pg/mL    Narrative    The generation of reference intervals for this test is currently based on binary male or female sex. If the electronic health record information indicates another gender identity or if Legal Sex is recorded as \"Unknown\", both male and female reference intervals are provided where applicable, and should be considered according to the individual's appropriate clinical context.   CBC with platelets and differential   Result Value Ref Range    WBC Count 4.7 4.0 - 11.0 10e3/uL    RBC Count 3.95 3.80 - 5.90 10e6/uL    Hemoglobin 12.0 11.7 - 17.7 g/dL    Hematocrit 34.1 (L) 35.0 - 53.0 %    MCV 86 78 - 100 fL    MCH 30.4 26.5 - 33.0 pg    MCHC 35.2 31.5 - 36.5 g/dL    RDW 12.0 10.0 - 15.0 %    " "Platelet Count 177 150 - 450 10e3/uL    % Neutrophils 58 %    % Lymphocytes 30 %    % Monocytes 9 %    % Eosinophils 3 %    % Basophils 0 %    % Immature Granulocytes 0 %    NRBCs per 100 WBC 0 <1 /100    Absolute Neutrophils 2.7 1.6 - 8.3 10e3/uL    Absolute Lymphocytes 1.4 0.8 - 5.3 10e3/uL    Absolute Monocytes 0.4 0.0 - 1.3 10e3/uL    Absolute Eosinophils 0.1 0.0 - 0.7 10e3/uL    Absolute Basophils 0.0 0.0 - 0.2 10e3/uL    Absolute Immature Granulocytes 0.0 <=0.4 10e3/uL    Absolute NRBCs 0.0 10e3/uL    Narrative    The generation of reference intervals for this test is currently based on binary male or female sex. If the electronic health record information indicates another gender identity or if Legal Sex is recorded as \"Unknown\", both male and female reference intervals are provided where applicable, and should be considered according to the individual's appropriate clinical context.   Extra Tube    Narrative    The following orders were created for panel order Extra Tube.  Procedure                               Abnormality         Status                     ---------                               -----------         ------                     Extra Blue Top Tube[[id number]]                             Final result               Extra Green Top (Lithiu...[[id number]]                      Final result                 Please view results for these tests on the individual orders.   Extra Blue Top Tube   Result Value Ref Range    Hold Specimen JIC    Extra Green Top (Lithium Heparin) Tube   Result Value Ref Range    Hold Specimen JIC    Extra Tube    Narrative    The following orders were created for panel order Extra Tube.  Procedure                               Abnormality         Status                     ---------                               -----------         ------                     Extra Red Top Tube[[id number]]                              Final result                 Please view results for these " "tests on the individual orders.   Extra Red Top Tube   Result Value Ref Range    Hold Specimen JIC    US Lower Extremity Venous Duplex Left    Narrative    EXAM: US LOWER EXTREMITY VENOUS DUPLEX LEFT  LOCATION: [facility]  DATE: 5/30/2025    INDICATION: Left lower extremity swelling and pain.  COMPARISON: None available.  TECHNIQUE: Venous Duplex ultrasound of the left lower extremity with and without compression, augmentation and duplex. Color flow and spectral Doppler with waveform analysis performed.    FINDINGS: Exam includes the common femoral, femoral, popliteal, and contralateral common femoral veins as well as segmentally visualized deep calf veins and greater saphenous vein.     LEFT: No deep vein thrombosis. No superficial thrombophlebitis.       Impression    IMPRESSION:  No deep venous thrombosis in the visualized left lower extremity.         Medications - No data to display    Assessments & Plan (with Medical Decision Making)  Tenzin Ca is a 49 year old adult with left leg swelling and burning pain. He reports left leg swelling for the past 3 weeks. Left lower leg pain for the past one week. Describes the pain as a burning pain. No injuries or trauma. He currently denies any numbness or tingling. Increased pain to his left leg when up ambulating with some numbness to the left lower leg when ambulating. He is concerned about a blood clot.   VS in the ED. BP (!) 175/99   Pulse 72   Temp 98.1  F (36.7  C) (Tympanic)   Resp 18   Ht 1.753 m (5' 9\")   Wt 132.4 kg (291 lb 12.8 oz)   SpO2 99%   BMI 43.09 kg/m    Diagnostics:    Lab: glucose 110. Hematocrit 34.1.     US lower extremity venous duplex left- No deep venous thrombosis in the visualized left lower extremity.     Tenzin is a 49-year-old adult evaluated today for left lower leg swelling and burning pain.  Differential diagnosis considered but not limited to includes DVT, heart failure, infection, ischemia, renal failure. "   He is awake and alert in no acute distress.  Nontoxic-appearing.  Mild left leg swelling compared to right.  Skin is pink warm and dry.  Sensation intact.  Full range of motion of left lower extremity.  Distal pulses present.   He is afebrile.  No leukocytosis.  Inflammatory markers are negative.  No evidence of open areas to left lower extremity.  No erythema or warmth.  Less likely infectious.  BNP is normal.  Kidney function is normal.  LFTs are normal.  Ultrasound with no evidence of DVT.  Discussed unclear what is causing his left lower leg swelling.  No indication at this time for further imaging.  He would like to go home.  Discussed follow-up with primary care provider. Discussed return to ED precautions. Verbalizes understanding of discharge plan.      I have reviewed the nursing notes.    I have reviewed the findings, diagnosis, plan and need for follow up with the patient    Medical Decision Making  The patient's presentation was of low complexity (an acute and uncomplicated illness or injury).    The patient's evaluation involved:  an assessment requiring an independent historian (see separate area of note for details)  ordering and/or review of 3+ test(s) in this encounter (see separate area of note for details)    The patient's management necessitated only low risk treatment.    Final diagnoses:   Left leg swelling     5/30/2025   [facility]       Emily Gomez, CORBIN SCOTT  05/30/25 [id number]

## 2025-05-31 NOTE — DISCHARGE INSTRUCTIONS
As discussed unclear what is causing your left leg swelling. Your liver function tests are normal. Your kidney function tests are normal. Ultrasound was negative for a deep venous thrombus. Your white blood count is normal today. Less likely infectious.     Follow up with your primary care provider as needed.     Please return to the emergency room if you experience worsening pain, redness, worsening swelling, numbness, tingling, or any new or worsening concerns.

## 2025-07-01 ENCOUNTER — HOSPITAL ENCOUNTER (EMERGENCY)
Facility: OTHER | Age: 50
Discharge: HOME | End: 2025-07-01
Attending: STUDENT IN AN ORGANIZED HEALTH CARE EDUCATION/TRAINING PROGRAM
Payer: COMMERCIAL

## 2025-07-01 VITALS
OXYGEN SATURATION: 97 % | HEIGHT: 69 IN | BODY MASS INDEX: 43.25 KG/M2 | TEMPERATURE: 97.9 F | SYSTOLIC BLOOD PRESSURE: 118 MMHG | RESPIRATION RATE: 17 BRPM | DIASTOLIC BLOOD PRESSURE: 78 MMHG | HEART RATE: 89 BPM | WEIGHT: 292 LBS

## 2025-07-01 DIAGNOSIS — K59.00 CONSTIPATION, UNSPECIFIED CONSTIPATION TYPE: ICD-10-CM

## 2025-07-01 LAB
HOLD SPECIMEN: NORMAL
TSH SERPL DL<=0.005 MIU/L-ACNC: 2.71 UIU/ML (ref 0.3–4.2)

## 2025-07-01 PROCEDURE — 250N000013 HC RX MED GY IP 250 OP 250 PS 637: Performed by: STUDENT IN AN ORGANIZED HEALTH CARE EDUCATION/TRAINING PROGRAM

## 2025-07-01 PROCEDURE — 36415 COLL VENOUS BLD VENIPUNCTURE: CPT | Performed by: STUDENT IN AN ORGANIZED HEALTH CARE EDUCATION/TRAINING PROGRAM

## 2025-07-01 PROCEDURE — 84443 ASSAY THYROID STIM HORMONE: CPT | Performed by: STUDENT IN AN ORGANIZED HEALTH CARE EDUCATION/TRAINING PROGRAM

## 2025-07-01 PROCEDURE — 99283 EMERGENCY DEPT VISIT LOW MDM: CPT | Performed by: STUDENT IN AN ORGANIZED HEALTH CARE EDUCATION/TRAINING PROGRAM

## 2025-07-01 PROCEDURE — 99284 EMERGENCY DEPT VISIT MOD MDM: CPT | Performed by: STUDENT IN AN ORGANIZED HEALTH CARE EDUCATION/TRAINING PROGRAM

## 2025-07-01 RX ORDER — SENNA AND DOCUSATE SODIUM 50; 8.6 MG/1; MG/1
1 TABLET, FILM COATED ORAL AT BEDTIME
Qty: 30 TABLET | Refills: 0 | Status: SHIPPED | OUTPATIENT
Start: 2025-07-01

## 2025-07-01 RX ORDER — LACTULOSE 10 G/10G
10 SOLUTION ORAL DAILY
Qty: 10 PACKET | Refills: 0 | Status: SHIPPED | OUTPATIENT
Start: 2025-07-01 | End: 2025-07-11

## 2025-07-01 RX ORDER — LACTULOSE 10 G/15ML
20 SOLUTION ORAL ONCE
Status: COMPLETED | OUTPATIENT
Start: 2025-07-01 | End: 2025-07-01

## 2025-07-01 RX ORDER — AMOXICILLIN 250 MG
1 CAPSULE ORAL ONCE
Status: COMPLETED | OUTPATIENT
Start: 2025-07-01 | End: 2025-07-01

## 2025-07-01 RX ADMIN — SENNOSIDES AND DOCUSATE SODIUM 1 TABLET: 50; 8.6 TABLET ORAL at 09:43

## 2025-07-01 RX ADMIN — LACTULOSE 20 G: 20 SOLUTION ORAL at 09:43

## 2025-07-01 ASSESSMENT — COLUMBIA-SUICIDE SEVERITY RATING SCALE - C-SSRS
1. IN THE PAST MONTH, HAVE YOU WISHED YOU WERE DEAD OR WISHED YOU COULD GO TO SLEEP AND NOT WAKE UP?: NO
2. HAVE YOU ACTUALLY HAD ANY THOUGHTS OF KILLING YOURSELF IN THE PAST MONTH?: NO
6. HAVE YOU EVER DONE ANYTHING, STARTED TO DO ANYTHING, OR PREPARED TO DO ANYTHING TO END YOUR LIFE?: NO

## 2025-07-01 ASSESSMENT — ACTIVITIES OF DAILY LIVING (ADL)
ADLS_ACUITY_SCORE: 41
ADLS_ACUITY_SCORE: 41

## 2025-07-01 NOTE — ED TRIAGE NOTES
Pt here for constipation.  Pt states he has not had a BM in 2 weeks.  Pt states he has taken a stool softener, MOM and nothing has work.  Pt has no hx of constipation.pt states his abdomen feels distended and bloated.

## 2025-07-01 NOTE — DISCHARGE INSTRUCTIONS
Your thyroid hormone level was normal and reassuring for unlikely cause of your constipation.     Add senna-docusate and lactulose to your current constipation medications.    Please follow up with your PCP (primary are provider) in the next 1-2 weeks. You may need to get another colonoscopy.    Please review attached instructions including reasons to return to the emergency department.

## 2025-07-01 NOTE — ED PROVIDER NOTES
History     Chief Complaint   Patient presents with    Constipation       Tenzin Ca is a 49 year old adult who presents with constipation.  Onset 2 weeks ago with last bowel movement then.  Prior to that normal pattern has been a bowel movement every other day.  Episodes of constipation in the past but none this bad.  He has been using MiraLAX multiple times daily and milk of magnesia with no result.  Denies any fever, nausea, vomiting, foul belching, abdominal pain, bloating.  He is passing gas.  Denies abdominal surgical history.  No preceding narrow caliber stools.  No recent medication changes.  He is on Synthroid.  Continues to be able to tolerate PO intake without issue.  He has had 2 colonoscopies most recently 2 to 3 years ago, he reports history of colon cancer which was resected.    Allergies   Allergen Reactions    Acetaminophen Unknown    Aripiprazole Other (See Comments)     Vomiting, weight gain    Tadalafil Nausea       Patient Active Problem List    Diagnosis Date Noted    Blood per rectum 04/20/2022     Priority: Medium    Chronic bilateral low back pain without sciatica 02/08/2022     Priority: Medium    Numbness 11/22/2021     Priority: Medium    S/P cervical spinal fusion 10/14/2021     Priority: Medium    Fusion of spine of cervical region 09/20/2021     Priority: Medium    Myelopathy concurrent with and due to spinal stenosis of cervical region (H) 09/06/2021     Priority: Medium    Paranoid schizophrenia (H) 09/04/2021     Priority: Medium    Sebaceous cyst 05/26/2021     Priority: Medium    Skin nodule 05/26/2021     Priority: Medium    Morbid obesity (H) 05/12/2021     Priority: Medium    Non-intractable vomiting with nausea 05/12/2021     Priority: Medium    Vitamin D deficiency 12/26/2018     Priority: Medium    Slow transit constipation 02/22/2018     Priority: Medium    Hypertriglyceridemia 02/20/2018     Priority: Medium    Major depressive disorder, recurrent episode 02/20/2018      Priority: Medium    Psoriasis 02/20/2018     Priority: Medium    Schizophrenia (H) 02/20/2018     Priority: Medium     Overview:   hears voices      Torn medial meniscus 02/20/2018     Priority: Medium    Obesity 02/21/2017     Priority: Medium    S/P left knee arthroscopy 07/05/2016     Priority: Medium    Psychosis (H) 07/02/2015     Priority: Medium    Erectile dysfunction 04/23/2014     Priority: Medium    Delayed ejaculation 06/03/2013     Priority: Medium       Past Medical History:   Diagnosis Date    Mental disorder     Personal history of other mental and behavioral disorders        Past Surgical History:   Procedure Laterality Date    ARTHROSCOPY KNEE      Left Knee Arthroscopy and Partial Meniscectomy and Chondroplasty    COLONOSCOPY N/A 6/16/2022    Procedure: COLONOSCOPY;  Surgeon: Frank Toro MD;  Location:  OR       Family History   Problem Relation Age of Onset    Substance Abuse Mother         Alcohol/Drug    Alcoholism Mother         Alcoholism    Substance Abuse Brother         Alcohol/Drug    Alcoholism Brother         Alcoholism    Substance Abuse Sister         Alcohol/Drug    Alcoholism Sister         Alcoholism       Social History     Tobacco Use    Smoking status: Never     Passive exposure: Yes    Smokeless tobacco: Never   Vaping Use    Vaping status: Never Used   Substance Use Topics    Alcohol use: No     Alcohol/week: 0.0 standard drinks of alcohol    Drug use: No       Medications:    ALPRAZolam (XANAX) 0.5 MG tablet  ARIPiprazole (ABILIFY) 20 MG tablet  ARIPiprazole (ABILIFY) 5 MG tablet  B Complex Vitamins (VITAMIN B COMPLEX PO)  cholecalciferol 50 MCG (2000 UT) CAPS  clonazePAM (KLONOPIN) 1 MG tablet  gabapentin (NEURONTIN) 100 MG capsule  lactulose (CEPHULAC) 10 GM packet  levothyroxine (SYNTHROID/LEVOTHROID) 25 MCG tablet  LINZESS 290 MCG capsule  omeprazole (PRILOSEC) 20 MG DR capsule  ondansetron (ZOFRAN ODT) 4 MG ODT tab  OXcarbazepine (TRILEPTAL) 300 MG  "tablet  OXcarbazepine (TRILEPTAL) 600 MG tablet  prazosin (MINIPRESS) 1 MG capsule  QUEtiapine (SEROQUEL) 50 MG tablet  QUEtiapine ER (SEROQUEL XR) 400 MG 24 hr tablet  SENNA-docusate sodium (SENNA S) 8.6-50 MG tablet  zolpidem (AMBIEN) 10 MG tablet        Review of Systems: See HPI for pertinent negatives and positives. All other systems reviewed and found to be negative.    Physical Exam   /78   Pulse 89   Temp 97.9  F (36.6  C) (Tympanic)   Resp 17   Ht 1.753 m (5' 9\")   Wt 132.5 kg (292 lb)   SpO2 97%   BMI 43.12 kg/m       General: awake, comfortable  HEENT: atraumatic  Respiratory: normal effort, clear to auscultation bilaterally  Cardiovascular: regular rate and rhythm, no murmurs  Abdomen: Bowel sounds in all 4 quadrants, soft, nondistended, nontender  Extremities: no deformities, edema, or tenderness  Skin: warm, dry, no rashes  Neuro: alert, no focal deficits  Psych: appropriate mood and affect    ED Course      Recent Results (from the past 24 hours)   TSH Reflex GH   Result Value Ref Range    TSH 2.71 0.30 - 4.20 uIU/mL   Extra Tube    Narrative    The following orders were created for panel order Extra Tube.  Procedure                               Abnormality         Status                     ---------                               -----------         ------                     Extra Blue Top Tube[[id number]]                             Final result               Extra Red Top Tube[[id number]]                              Final result               Extra Purple Top Tube[[id number]]                           Final result               Extra Green Top (Lithiu...[[id number]]                      Final result                 Please view results for these tests on the individual orders.   Extra Blue Top Tube   Result Value Ref Range    Hold Specimen JIC    Extra Red Top Tube   Result Value Ref Range    Hold Specimen JIC    Extra Purple Top Tube   Result Value Ref Range    Hold Specimen JIC  "   Extra Green Top (Lithium Heparin) ON ICE   Result Value Ref Range    Hold Specimen JI        Medications   lactulose solution 20 g (20 g Oral $Given 7/1/25 0947)   senna-docusate (SENOKOT-S/PERICOLACE) 8.6-50 MG per tablet 1 tablet (1 tablet Oral $Given 7/1/25 0952)       Assessments & Plan (with Medical Decision Making)     I have reviewed the nursing notes.          49 year old adult evaluated for constipation for 2 weeks.  Reassuring symptoms including ability to pass gas and no nausea or vomiting.  Patient is on Synthroid and TSH was tested and unremarkable.  Given senna docusate and lactulose here.  Prior to discharge he does now have an urge to have a bowel movement.  Will send home with short prescriptions for these and stressed the importance of close PCP follow-up, particularly with his history of multiple colonoscopies and history of colon cancer per patient.  Abdominal exam is benign and there is not an indication for emergent CT scan at this time. Discharged home with below plan and attached instructions on diagnosis provided including ED return precautions.     I have reviewed the findings, diagnosis, plan, and need for any follow up with the patient.    Patient instructions:   Your thyroid hormone level was normal and reassuring for unlikely cause of your constipation.     Add senna-docusate and lactulose to your current constipation medications.    Please follow up with your PCP (primary are provider) in the next 1-2 weeks. You may need to get another colonoscopy.    Please review attached instructions including reasons to return to the emergency department.     Discharge Medication List as of 7/1/2025 10:55 AM        START taking these medications    Details   lactulose (CEPHULAC) 10 GM packet Take 1 packet (10 g) by mouth daily for 10 days., Disp-10 packet, R-0, E-Prescribe      SENNA-docusate sodium (SENNA S) 8.6-50 MG tablet Take 1 tablet by mouth at bedtime., Disp-30 tablet, R-0, E-Prescribe              Final diagnoses:   Constipation, unspecified constipation type       7/1/2025   [facility]     Tru Chiang MD  07/01/25 4423

## (undated) DEVICE — TUBING SUCTION 10'X3/16" N510

## (undated) DEVICE — ENDO KIT COMPLIANCE DYKENDOCMPLY

## (undated) DEVICE — SOL WATER 1500ML

## (undated) DEVICE — ENDO BRUSH CHANNEL MASTER CLEANING 2-4.2MM BW-412T

## (undated) DEVICE — SUCTION MANIFOLD NEPTUNE 2 SYS 4 PORT 0702-020-000

## (undated) RX ORDER — CLONAZEPAM 0.5 MG/1
TABLET ORAL
Status: DISPENSED
Start: 2023-10-22

## (undated) RX ORDER — PROPOFOL 10 MG/ML
INJECTION, EMULSION INTRAVENOUS
Status: DISPENSED
Start: 2022-06-16

## (undated) RX ORDER — METHYLPREDNISOLONE SODIUM SUCCINATE 125 MG/2ML
INJECTION, POWDER, LYOPHILIZED, FOR SOLUTION INTRAMUSCULAR; INTRAVENOUS
Status: DISPENSED
Start: 2022-08-01

## (undated) RX ORDER — ONDANSETRON 2 MG/ML
INJECTION INTRAMUSCULAR; INTRAVENOUS
Status: DISPENSED
Start: 2023-05-23

## (undated) RX ORDER — LIDOCAINE HYDROCHLORIDE 10 MG/ML
INJECTION, SOLUTION INFILTRATION; PERINEURAL
Status: DISPENSED
Start: 2023-06-17

## (undated) RX ORDER — IBUPROFEN 200 MG
TABLET ORAL
Status: DISPENSED
Start: 2023-06-17

## (undated) RX ORDER — SODIUM CHLORIDE 9 MG/ML
INJECTION, SOLUTION INTRAVENOUS
Status: DISPENSED
Start: 2018-12-14

## (undated) RX ORDER — LORAZEPAM 0.5 MG/1
TABLET ORAL
Status: DISPENSED
Start: 2023-06-17

## (undated) RX ORDER — KETOROLAC TROMETHAMINE 30 MG/ML
INJECTION, SOLUTION INTRAMUSCULAR; INTRAVENOUS
Status: DISPENSED
Start: 2022-08-01

## (undated) RX ORDER — CLONAZEPAM 0.5 MG/1
TABLET ORAL
Status: DISPENSED
Start: 2023-10-21

## (undated) RX ORDER — ONDANSETRON 4 MG/1
TABLET, ORALLY DISINTEGRATING ORAL
Status: DISPENSED
Start: 2023-10-22

## (undated) RX ORDER — LIDOCAINE 50 MG/G
PATCH TOPICAL
Status: DISPENSED
Start: 2022-08-01

## (undated) RX ORDER — ONDANSETRON 2 MG/ML
INJECTION INTRAMUSCULAR; INTRAVENOUS
Status: DISPENSED
Start: 2021-09-04

## (undated) RX ORDER — LIDOCAINE HYDROCHLORIDE 20 MG/ML
INJECTION, SOLUTION EPIDURAL; INFILTRATION; INTRACAUDAL; PERINEURAL
Status: DISPENSED
Start: 2022-06-16

## (undated) RX ORDER — KETOROLAC TROMETHAMINE 15 MG/ML
INJECTION, SOLUTION INTRAMUSCULAR; INTRAVENOUS
Status: DISPENSED
Start: 2023-05-10

## (undated) RX ORDER — PANTOPRAZOLE SODIUM 40 MG/1
TABLET, DELAYED RELEASE ORAL
Status: DISPENSED
Start: 2023-10-22

## (undated) RX ORDER — SODIUM CHLORIDE 9 MG/ML
INJECTION, SOLUTION INTRAVENOUS
Status: DISPENSED
Start: 2019-12-02